# Patient Record
Sex: FEMALE | Race: ASIAN | Employment: FULL TIME | URBAN - METROPOLITAN AREA
[De-identification: names, ages, dates, MRNs, and addresses within clinical notes are randomized per-mention and may not be internally consistent; named-entity substitution may affect disease eponyms.]

---

## 2019-06-03 ENCOUNTER — OFFICE VISIT (OUTPATIENT)
Dept: FAMILY MEDICINE CLINIC | Facility: CLINIC | Age: 24
End: 2019-06-03
Payer: MEDICAID

## 2019-06-03 ENCOUNTER — APPOINTMENT (OUTPATIENT)
Dept: LAB | Age: 24
End: 2019-06-03
Attending: NURSE PRACTITIONER
Payer: MEDICAID

## 2019-06-03 VITALS
HEIGHT: 61 IN | HEART RATE: 64 BPM | SYSTOLIC BLOOD PRESSURE: 99 MMHG | BODY MASS INDEX: 17.94 KG/M2 | DIASTOLIC BLOOD PRESSURE: 62 MMHG | WEIGHT: 95 LBS

## 2019-06-03 DIAGNOSIS — Z11.1 SCREENING-PULMONARY TB: Primary | ICD-10-CM

## 2019-06-03 DIAGNOSIS — Z01.84 IMMUNITY STATUS TESTING: ICD-10-CM

## 2019-06-03 DIAGNOSIS — Z00.00 WELL ADULT EXAM: ICD-10-CM

## 2019-06-03 DIAGNOSIS — Z11.1 SCREENING-PULMONARY TB: ICD-10-CM

## 2019-06-03 DIAGNOSIS — Z23 IMMUNIZATION DUE: ICD-10-CM

## 2019-06-03 PROCEDURE — 90715 TDAP VACCINE 7 YRS/> IM: CPT | Performed by: NURSE PRACTITIONER

## 2019-06-03 PROCEDURE — 86735 MUMPS ANTIBODY: CPT

## 2019-06-03 PROCEDURE — 99395 PREV VISIT EST AGE 18-39: CPT | Performed by: NURSE PRACTITIONER

## 2019-06-03 PROCEDURE — 86480 TB TEST CELL IMMUN MEASURE: CPT

## 2019-06-03 PROCEDURE — 86706 HEP B SURFACE ANTIBODY: CPT

## 2019-06-03 PROCEDURE — 90471 IMMUNIZATION ADMIN: CPT | Performed by: NURSE PRACTITIONER

## 2019-06-03 PROCEDURE — 36415 COLL VENOUS BLD VENIPUNCTURE: CPT

## 2019-06-03 PROCEDURE — 86765 RUBEOLA ANTIBODY: CPT

## 2019-06-03 PROCEDURE — 86787 VARICELLA-ZOSTER ANTIBODY: CPT

## 2019-06-03 PROCEDURE — 86762 RUBELLA ANTIBODY: CPT

## 2019-06-03 NOTE — PATIENT INSTRUCTIONS
Prevention Guidelines, Women Ages 25 to 44  Screening tests and vaccines are an important part of managing your health. A screening test is done to find possible disorders or diseases in people who don't have any symptoms.  The goal is to find a disease e Type 2 diabetes, prediabetes All women diagnosed with gestational diabetes Lifelong testing every 3 years   Type 2 diabetes All women with prediabetes Every year   Gonorrhea Sexually active women at increased risk for infection At routine exams   Hepatitis Measles, mumps, rubella (MMR) All women in this age group who have no record of these infections or vaccines 1 or 2 doses   Meningococcal Women at increased risk for infection should talk with their healthcare provider 1 or more doses   Pneumococcal conjug © 9136-1410 [employer] [address]. 140[address] [facility], [facility] [place]. All rights reserved. This information is not intended as a substitute for professional medical care. Always follow your healthcare professional's instructions.

## 2019-06-03 NOTE — PROGRESS NOTES
HPI  Pt here for titers for nursing school. Is healthy adult-denies complaints, pmh  born in [place]  Negative fmh.     Vaccination records not available     Has IUD in place   Review of Systems   Constitutional: Negative for activity change, appetite null Medical: Not on file        Non-medical: Not on file    Tobacco Use      Smoking status: Never Smoker      Smokeless tobacco: Never Used    Substance and Sexual Activity      Alcohol use: Not Currently      Drug use: Not Currently      Sexual activity: Not tenderness. Abdominal: Soft. Bowel sounds are normal. She exhibits no distension. There is no tenderness. Musculoskeletal: Normal range of motion. She exhibits no tenderness. Lymphadenopathy:     She has no cervical adenopathy.    Neurological: She is

## 2019-06-07 ENCOUNTER — TELEPHONE (OUTPATIENT)
Dept: FAMILY MEDICINE CLINIC | Facility: CLINIC | Age: 24
End: 2019-06-07

## 2019-06-07 ENCOUNTER — NURSE ONLY (OUTPATIENT)
Dept: FAMILY MEDICINE CLINIC | Facility: CLINIC | Age: 24
End: 2019-06-07
Payer: MEDICAID

## 2019-06-07 DIAGNOSIS — Z23 IMMUNIZATION DUE: ICD-10-CM

## 2019-06-07 PROCEDURE — 90716 VAR VACCINE LIVE SUBQ: CPT | Performed by: NURSE PRACTITIONER

## 2019-06-07 PROCEDURE — 90471 IMMUNIZATION ADMIN: CPT | Performed by: NURSE PRACTITIONER

## 2019-06-07 NOTE — PROGRESS NOTES
Patient is here today for her varicella vaccine, verified name,  and medication.  Patient tolerated injection well       Patient will return in 6 wks for titers once more

## 2019-07-12 ENCOUNTER — APPOINTMENT (OUTPATIENT)
Dept: LAB | Age: 24
End: 2019-07-12
Attending: NURSE PRACTITIONER
Payer: MEDICAID

## 2019-07-12 DIAGNOSIS — Z01.84 IMMUNITY STATUS TESTING: ICD-10-CM

## 2019-07-12 DIAGNOSIS — Z01.84 IMMUNITY STATUS TESTING: Primary | ICD-10-CM

## 2019-07-12 LAB — VZV IGG SER IA-ACNC: 169.6 (ref 165–?)

## 2019-07-12 PROCEDURE — 36415 COLL VENOUS BLD VENIPUNCTURE: CPT

## 2019-07-12 PROCEDURE — 86787 VARICELLA-ZOSTER ANTIBODY: CPT

## 2019-07-15 ENCOUNTER — PATIENT MESSAGE (OUTPATIENT)
Dept: FAMILY MEDICINE CLINIC | Facility: CLINIC | Age: 24
End: 2019-07-15

## 2019-12-17 ENCOUNTER — OFFICE VISIT (OUTPATIENT)
Dept: FAMILY MEDICINE CLINIC | Facility: CLINIC | Age: 24
End: 2019-12-17
Payer: MEDICAID

## 2019-12-17 ENCOUNTER — LAB ENCOUNTER (OUTPATIENT)
Dept: LAB | Age: 24
End: 2019-12-17
Attending: NURSE PRACTITIONER
Payer: MEDICAID

## 2019-12-17 VITALS
HEART RATE: 71 BPM | HEIGHT: 61 IN | SYSTOLIC BLOOD PRESSURE: 104 MMHG | WEIGHT: 97 LBS | BODY MASS INDEX: 18.31 KG/M2 | DIASTOLIC BLOOD PRESSURE: 68 MMHG

## 2019-12-17 DIAGNOSIS — R22.32 AXILLARY MASS, LEFT: ICD-10-CM

## 2019-12-17 DIAGNOSIS — Z30.432 ENCOUNTER FOR IUD REMOVAL: ICD-10-CM

## 2019-12-17 DIAGNOSIS — Z23 IMMUNIZATION DUE: ICD-10-CM

## 2019-12-17 DIAGNOSIS — E04.9 ENLARGED THYROID: ICD-10-CM

## 2019-12-17 DIAGNOSIS — E04.9 ENLARGED THYROID: Primary | ICD-10-CM

## 2019-12-17 PROCEDURE — 90471 IMMUNIZATION ADMIN: CPT | Performed by: NURSE PRACTITIONER

## 2019-12-17 PROCEDURE — 36415 COLL VENOUS BLD VENIPUNCTURE: CPT

## 2019-12-17 PROCEDURE — 84443 ASSAY THYROID STIM HORMONE: CPT

## 2019-12-17 PROCEDURE — 84439 ASSAY OF FREE THYROXINE: CPT

## 2019-12-17 PROCEDURE — 99213 OFFICE O/P EST LOW 20 MIN: CPT | Performed by: NURSE PRACTITIONER

## 2019-12-17 PROCEDURE — 90651 9VHPV VACCINE 2/3 DOSE IM: CPT | Performed by: NURSE PRACTITIONER

## 2019-12-17 PROCEDURE — 58301 REMOVE INTRAUTERINE DEVICE: CPT | Performed by: NURSE PRACTITIONER

## 2019-12-17 NOTE — PATIENT INSTRUCTIONS
Guidelines for Safer Sex      Limit your sexual activity to only 1 partner who is having sex only with you to reduce exposure to disease-causing organisms. Follow these guidelines for safer sex:     Think twice before beginning sexual relations with a new p

## 2019-12-17 NOTE — PROGRESS NOTES
HPI  Pt presents for IUD removal-placed in 2017-gets a lot of vaginal d/c  Has lump on neck and in left axilla-non painful  Would like to start gardisil series. Has had flu shot this season    Review of Systems   HENT: Negative for trouble swallowing. Active member of club or organization: Not on file        Attends meetings of clubs or organizations: Not on file        Relationship status: Not on file      Intimate partner violence:        Fear of current or ex partner: Not on file        Emotionally a left        Relevant Orders    US AXILLARY LEFT SH(CPT=76882)                      Discussed plan of care with pt and pt is in agreement. All questions answered. Pt to call with questions or concerns.     Encouraged to sign up for My Chart if not already reg

## 2019-12-18 NOTE — PROCEDURES
Small speculum placed within vagina and opened' iud strings located from cervical os. Forceps inserted into speculum, strings grasped and with gentle tug, IUD was removed from uterus. No uterine bleeding noted. Pt tolerated procedure well.  Encouraged to u

## 2019-12-20 ENCOUNTER — PATIENT MESSAGE (OUTPATIENT)
Dept: FAMILY MEDICINE CLINIC | Facility: CLINIC | Age: 24
End: 2019-12-20

## 2019-12-20 DIAGNOSIS — E03.9 HYPOTHYROIDISM, UNSPECIFIED TYPE: Primary | ICD-10-CM

## 2019-12-20 RX ORDER — FLUCONAZOLE 200 MG/1
200 TABLET ORAL DAILY
Qty: 3 TABLET | Refills: 0 | Status: SHIPPED | OUTPATIENT
Start: 2019-12-20 | End: 2019-12-23

## 2019-12-23 NOTE — TELEPHONE ENCOUNTER
From: Luis Adler  To: KATARZYNA Gutierrez  Sent: 12/20/2019 11:44 PM CST  Subject: Test Results Question    I have a question about TSH W REFLEX TO FREE T4 resulted on 12/17/19, 3:10 PM.    I already scheduled an appointment for thyroid ultrasound

## 2019-12-24 ENCOUNTER — LAB ENCOUNTER (OUTPATIENT)
Dept: LAB | Age: 24
End: 2019-12-24
Attending: NURSE PRACTITIONER
Payer: MEDICAID

## 2019-12-24 DIAGNOSIS — E03.9 HYPOTHYROIDISM, UNSPECIFIED TYPE: ICD-10-CM

## 2019-12-24 PROCEDURE — 84443 ASSAY THYROID STIM HORMONE: CPT

## 2019-12-24 PROCEDURE — 84439 ASSAY OF FREE THYROXINE: CPT

## 2019-12-24 PROCEDURE — 86800 THYROGLOBULIN ANTIBODY: CPT

## 2019-12-24 PROCEDURE — 84480 ASSAY TRIIODOTHYRONINE (T3): CPT

## 2019-12-24 PROCEDURE — 86376 MICROSOMAL ANTIBODY EACH: CPT

## 2019-12-24 PROCEDURE — 36415 COLL VENOUS BLD VENIPUNCTURE: CPT

## 2019-12-24 PROCEDURE — 83970 ASSAY OF PARATHORMONE: CPT

## 2019-12-24 PROCEDURE — 84481 FREE ASSAY (FT-3): CPT

## 2019-12-30 DIAGNOSIS — E03.9 ACQUIRED HYPOTHYROIDISM: Primary | ICD-10-CM

## 2019-12-30 RX ORDER — LEVOTHYROXINE SODIUM 25 UG/1
25 CAPSULE ORAL DAILY
Qty: 90 CAPSULE | Refills: 1 | Status: SHIPPED | OUTPATIENT
Start: 2019-12-30 | End: 2019-12-31

## 2019-12-31 ENCOUNTER — TELEPHONE (OUTPATIENT)
Dept: FAMILY MEDICINE CLINIC | Facility: CLINIC | Age: 24
End: 2019-12-31

## 2019-12-31 DIAGNOSIS — E03.9 ACQUIRED HYPOTHYROIDISM: ICD-10-CM

## 2019-12-31 RX ORDER — LEVOTHYROXINE SODIUM 25 UG/1
25 CAPSULE ORAL DAILY
Qty: 90 CAPSULE | Refills: 1 | Status: SHIPPED | OUTPATIENT
Start: 2019-12-31 | End: 2020-01-03 | Stop reason: ALTCHOICE

## 2019-12-31 NOTE — TELEPHONE ENCOUNTER
Pt requesting to send medication to Dinah in [facility], [address] [place] [address].  Sent

## 2020-01-02 ENCOUNTER — TELEPHONE (OUTPATIENT)
Dept: FAMILY MEDICINE CLINIC | Facility: CLINIC | Age: 25
End: 2020-01-02

## 2020-01-02 RX ORDER — FLUCONAZOLE 200 MG/1
200 TABLET ORAL DAILY
Qty: 3 TABLET | Refills: 0 | Status: SHIPPED | OUTPATIENT
Start: 2020-01-02 | End: 2020-01-03

## 2020-01-02 NOTE — TELEPHONE ENCOUNTER
Patient indicating her yeast symptoms have not completely resolved. States you indicated that you would continue treatment for three more days if symptoms did not improve. Asking if new rx can be sent to her pharmacy.

## 2020-01-02 NOTE — TELEPHONE ENCOUNTER
Attempt made to contact the patient; no answer phone does not ring. Zinwave message sent to the patient.

## 2020-01-02 NOTE — TELEPHONE ENCOUNTER
----- Message from Shorty Browne sent at 1/2/2020  9:23 AM CST -----  Regarding: Prescription Question  Contact: [phone]  Good morning. Can you please prescribe more dosage for my yeast infection? Thank you!

## 2020-01-03 ENCOUNTER — TELEPHONE (OUTPATIENT)
Dept: FAMILY MEDICINE CLINIC | Facility: CLINIC | Age: 25
End: 2020-01-03

## 2020-01-03 RX ORDER — LEVOTHYROXINE SODIUM 0.03 MG/1
25 TABLET ORAL
Qty: 90 TABLET | Refills: 1 | Status: SHIPPED | OUTPATIENT
Start: 2020-01-03 | End: 2021-02-09

## 2020-01-03 RX ORDER — FLUCONAZOLE 200 MG/1
200 TABLET ORAL DAILY
Qty: 3 TABLET | Refills: 0 | Status: SHIPPED | OUTPATIENT
Start: 2020-01-03 | End: 2020-01-06

## 2020-01-03 NOTE — TELEPHONE ENCOUNTER
Spoke with patient, informed of RX being sent to [facility], States please send to [place] as [facility] does nit cover her insurance,, RX sent to [facility] and  Cancelled the RX for Fluconazole

## 2020-01-06 ENCOUNTER — PATIENT MESSAGE (OUTPATIENT)
Dept: FAMILY MEDICINE CLINIC | Facility: CLINIC | Age: 25
End: 2020-01-06

## 2020-01-06 DIAGNOSIS — E01.0 THYROMEGALY: Primary | ICD-10-CM

## 2020-01-06 DIAGNOSIS — E03.9 ACQUIRED HYPOTHYROIDISM: ICD-10-CM

## 2020-01-07 ENCOUNTER — HOSPITAL ENCOUNTER (OUTPATIENT)
Dept: ULTRASOUND IMAGING | Facility: HOSPITAL | Age: 25
Discharge: HOME | End: 2020-01-07
Attending: NURSE PRACTITIONER
Payer: MEDICAID

## 2020-01-07 ENCOUNTER — APPOINTMENT (OUTPATIENT)
Dept: ULTRASOUND IMAGING | Facility: HOSPITAL | Age: 25
End: 2020-01-07
Attending: NURSE PRACTITIONER
Payer: MEDICAID

## 2020-01-07 DIAGNOSIS — R22.32 AXILLARY MASS, LEFT: ICD-10-CM

## 2020-01-07 PROCEDURE — 76882 US LMTD JT/FCL EVL NVASC XTR: CPT | Performed by: NURSE PRACTITIONER

## 2020-01-07 NOTE — TELEPHONE ENCOUNTER
From: Frankey Player  To: KATARZYNA Tabor  Sent: 1/6/2020 9:57 PM CST  Subject: Other    Dear Kem Phoenix,    My insurance canceled my appointment for thyroid ultrasound again. Please let me know your further plan of care. Thank you.

## 2020-01-13 ENCOUNTER — PATIENT MESSAGE (OUTPATIENT)
Dept: FAMILY MEDICINE CLINIC | Facility: CLINIC | Age: 25
End: 2020-01-13

## 2020-01-14 RX ORDER — FLUCONAZOLE 200 MG/1
TABLET ORAL
Qty: 2 TABLET | Refills: 0 | Status: SHIPPED | OUTPATIENT
Start: 2020-01-14 | End: 2020-07-07

## 2020-01-14 NOTE — TELEPHONE ENCOUNTER
From: Frankey Player  To: KATARZYNA Tabor  Sent: 1/13/2020 11:48 PM CST  Subject: Visit Follow-up Question    Dear Kem Phoenix,  Matt evening.    There is less to a little bit discharge coming out after I finished taking the medications for yeast i

## 2020-01-14 NOTE — TELEPHONE ENCOUNTER
Cortes Mtz: last fluconazole sent 1/3/2020. Please advise if she needs to be seen or rx can be sent.

## 2020-02-01 ENCOUNTER — APPOINTMENT (OUTPATIENT)
Dept: LAB | Age: 25
End: 2020-02-01
Attending: NURSE PRACTITIONER
Payer: MEDICAID

## 2020-02-01 DIAGNOSIS — E03.9 ACQUIRED HYPOTHYROIDISM: ICD-10-CM

## 2020-02-01 LAB — TSI SER-ACNC: 2.2 MIU/ML (ref 0.36–3.74)

## 2020-02-01 PROCEDURE — 84443 ASSAY THYROID STIM HORMONE: CPT

## 2020-02-01 PROCEDURE — 36415 COLL VENOUS BLD VENIPUNCTURE: CPT

## 2020-05-27 ENCOUNTER — PATIENT MESSAGE (OUTPATIENT)
Dept: FAMILY MEDICINE CLINIC | Facility: CLINIC | Age: 25
End: 2020-05-27

## 2020-05-28 NOTE — TELEPHONE ENCOUNTER
RuiYi message sent. From: Hanny Escobedo  To: KATARZYNA Goff  Sent: 5/27/2020 10:14 PM CDT  Subject: Non-Urgent Medical Question    Dear doctor,    Can you please fax my immunization records to the attached contact number? Thank you!

## 2020-07-07 ENCOUNTER — OFFICE VISIT (OUTPATIENT)
Dept: FAMILY MEDICINE CLINIC | Facility: CLINIC | Age: 25
End: 2020-07-07
Payer: MEDICAID

## 2020-07-07 ENCOUNTER — APPOINTMENT (OUTPATIENT)
Dept: LAB | Age: 25
End: 2020-07-07
Attending: NURSE PRACTITIONER
Payer: MEDICAID

## 2020-07-07 VITALS
HEART RATE: 63 BPM | BODY MASS INDEX: 18.12 KG/M2 | SYSTOLIC BLOOD PRESSURE: 107 MMHG | HEIGHT: 61 IN | WEIGHT: 96 LBS | DIASTOLIC BLOOD PRESSURE: 68 MMHG

## 2020-07-07 DIAGNOSIS — E03.9 ACQUIRED HYPOTHYROIDISM: ICD-10-CM

## 2020-07-07 DIAGNOSIS — N89.8 VAGINAL DISCHARGE: ICD-10-CM

## 2020-07-07 DIAGNOSIS — R35.0 URINARY FREQUENCY: ICD-10-CM

## 2020-07-07 DIAGNOSIS — Z23 IMMUNIZATION DUE: Primary | ICD-10-CM

## 2020-07-07 LAB
MULTISTIX LOT#: 1044 NUMERIC
PH, URINE: 6.7 (ref 4.5–8)
SPECIFIC GRAVITY: 1.01 (ref 1–1.03)
TSI SER-ACNC: 1.01 MIU/ML (ref 0.36–3.74)
URINE-COLOR: YELLOW
UROBILINOGEN,SEMI-QN: 0.2 MG/DL (ref 0–1.9)

## 2020-07-07 PROCEDURE — 90471 IMMUNIZATION ADMIN: CPT | Performed by: NURSE PRACTITIONER

## 2020-07-07 PROCEDURE — 81003 URINALYSIS AUTO W/O SCOPE: CPT | Performed by: NURSE PRACTITIONER

## 2020-07-07 PROCEDURE — 84443 ASSAY THYROID STIM HORMONE: CPT

## 2020-07-07 PROCEDURE — 36415 COLL VENOUS BLD VENIPUNCTURE: CPT

## 2020-07-07 PROCEDURE — 90651 9VHPV VACCINE 2/3 DOSE IM: CPT | Performed by: NURSE PRACTITIONER

## 2020-07-07 PROCEDURE — 99213 OFFICE O/P EST LOW 20 MIN: CPT | Performed by: NURSE PRACTITIONER

## 2020-07-07 RX ORDER — NITROFURANTOIN 25; 75 MG/1; MG/1
100 CAPSULE ORAL 2 TIMES DAILY
Qty: 14 CAPSULE | Refills: 0 | Status: SHIPPED | OUTPATIENT
Start: 2020-07-07 | End: 2020-10-29 | Stop reason: ALTCHOICE

## 2020-07-07 NOTE — PROGRESS NOTES
HPI    Pt presents for 2nd hpv    F/u on tsh-is taking levothyroxine 25 mcg daily; insurance would not approve her thyroid u/s    Having vaginal d/c after period -feeling like she needs to go to bathroom. Has some vaginal itching.    Review of Systems   Con Social Needs      Financial resource strain: Not on file      Food insecurity:        Worry: Not on file        Inability: Not on file      Transportation needs:        Medical: Not on file        Non-medical: Not on file    Tobacco Use      Smoking status exhibits no distension. Genitourinary: There is no rash or tenderness on the right labia. There is no rash or tenderness on the left labia. Vaginal erythema and tenderness present. No vaginal discharge.    There is erythema and tenderness in the v

## 2020-07-07 NOTE — PATIENT INSTRUCTIONS
Hypothyroidism    You have hypothyroidism. This means your thyroid gland is not making enough thyroid hormone. This hormone is vital to body growth and metabolism. If you don’t make enough, many body processes slow down.  This can cause symptoms throughou · Don’t take other medicines with your thyroid hormone pill without checking with your provider first.  · Tell your provider if you have any side effects from your medicines that bother you, especially any chest pain or irregular heartbeats.   · Never null · Change out of wet bathing suits and damp exercise clothes as soon as possible. Yeast thrive in a warm, moist environment. · Avoid wearing tight pants. Choose cotton underwear and pantyhose that have a cotton crotch.  Severa Spinner keeps you cooler and drier rafaela

## 2020-07-09 LAB
GENITAL VAGINOSIS SCREEN: POSITIVE
TRICHOMONAS SCREEN: NEGATIVE

## 2020-10-29 ENCOUNTER — OFFICE VISIT (OUTPATIENT)
Dept: FAMILY MEDICINE CLINIC | Facility: CLINIC | Age: 25
End: 2020-10-29
Payer: MEDICAID

## 2020-10-29 VITALS
WEIGHT: 99 LBS | BODY MASS INDEX: 18.69 KG/M2 | HEIGHT: 61 IN | DIASTOLIC BLOOD PRESSURE: 66 MMHG | HEART RATE: 66 BPM | TEMPERATURE: 98 F | SYSTOLIC BLOOD PRESSURE: 101 MMHG

## 2020-10-29 DIAGNOSIS — Z30.42 ENCOUNTER FOR DEPO-PROVERA CONTRACEPTION: ICD-10-CM

## 2020-10-29 DIAGNOSIS — Z23 ENCOUNTER FOR ADMINISTRATION OF VACCINE: Primary | ICD-10-CM

## 2020-10-29 DIAGNOSIS — N89.8 VAGINAL DISCHARGE: ICD-10-CM

## 2020-10-29 PROCEDURE — 90471 IMMUNIZATION ADMIN: CPT | Performed by: NURSE PRACTITIONER

## 2020-10-29 PROCEDURE — 81025 URINE PREGNANCY TEST: CPT | Performed by: NURSE PRACTITIONER

## 2020-10-29 PROCEDURE — 99214 OFFICE O/P EST MOD 30 MIN: CPT | Performed by: NURSE PRACTITIONER

## 2020-10-29 PROCEDURE — 90651 9VHPV VACCINE 2/3 DOSE IM: CPT | Performed by: NURSE PRACTITIONER

## 2020-10-29 PROCEDURE — 96372 THER/PROPH/DIAG INJ SC/IM: CPT | Performed by: NURSE PRACTITIONER

## 2020-10-29 PROCEDURE — 3078F DIAST BP <80 MM HG: CPT | Performed by: NURSE PRACTITIONER

## 2020-10-29 PROCEDURE — 3074F SYST BP LT 130 MM HG: CPT | Performed by: NURSE PRACTITIONER

## 2020-10-29 PROCEDURE — 3008F BODY MASS INDEX DOCD: CPT | Performed by: NURSE PRACTITIONER

## 2020-10-29 RX ORDER — FLUCONAZOLE 200 MG/1
TABLET ORAL
Qty: 2 TABLET | Refills: 0 | Status: SHIPPED | OUTPATIENT
Start: 2020-10-29 | End: 2021-03-22

## 2020-10-29 RX ORDER — MEDROXYPROGESTERONE ACETATE 150 MG/ML
150 INJECTION, SUSPENSION INTRAMUSCULAR ONCE
Status: COMPLETED | OUTPATIENT
Start: 2020-10-29 | End: 2020-10-29

## 2020-10-29 RX ADMIN — MEDROXYPROGESTERONE ACETATE 150 MG: 150 INJECTION, SUSPENSION INTRAMUSCULAR at 13:45:00

## 2020-10-29 NOTE — PATIENT INSTRUCTIONS
Yeast Infection (Candida Vaginal Infection)    You have a Candida vaginal infection. This is also known as a yeast infection. It is most often caused by a type of yeast (fungus) called Candida. Candida are normally found in the vagina.  But if they increa educational content on 10/1/2017  © 2125-8955 [employer] [address]. 1407 [facility], [facility]2 [place]. All rights reserved. This information is not intended as a substitute for professional medical care.  Always follow your healthcare professio

## 2020-10-29 NOTE — PROGRESS NOTES
HPI    Patient presents for white vaginal discharge that has been present for the past two months. Denies urinary symptoms. Currently sexually active. Using condoms for protection. Refusing std screening today. Would like gardasil shot today. Talks on phone: Not on file        Gets together: Not on file        Attends Hinduism service: Not on file        Active member of club or organization: Not on file        Attends meetings of clubs or organizations: Not on file        Relationship status: Depo-Provera contraception        Relevant Medications    medroxyPROGESTERone Acetate MICHAEL 150 mg    Other Relevant Orders    URINE PREGNANCY TEST         Fluconazole as directed. Vaginal culture collected and sent out.   UCG in office negative; will recei

## 2020-10-30 DIAGNOSIS — B96.89 BV (BACTERIAL VAGINOSIS): Primary | ICD-10-CM

## 2020-10-30 DIAGNOSIS — N76.0 BV (BACTERIAL VAGINOSIS): Primary | ICD-10-CM

## 2020-10-30 RX ORDER — METRONIDAZOLE 500 MG/1
500 TABLET ORAL 2 TIMES DAILY
Qty: 14 TABLET | Refills: 0 | Status: SHIPPED | OUTPATIENT
Start: 2020-10-30 | End: 2020-11-06

## 2021-02-09 RX ORDER — LEVOTHYROXINE SODIUM 0.03 MG/1
25 TABLET ORAL
Qty: 90 TABLET | Refills: 1 | Status: SHIPPED | OUTPATIENT
Start: 2021-02-09 | End: 2021-08-24

## 2021-03-22 ENCOUNTER — OFFICE VISIT (OUTPATIENT)
Dept: FAMILY MEDICINE CLINIC | Facility: CLINIC | Age: 26
End: 2021-03-22
Payer: MEDICAID

## 2021-03-22 ENCOUNTER — LAB ENCOUNTER (OUTPATIENT)
Dept: LAB | Age: 26
End: 2021-03-22
Attending: NURSE PRACTITIONER
Payer: MEDICAID

## 2021-03-22 VITALS
BODY MASS INDEX: 19.07 KG/M2 | WEIGHT: 101 LBS | DIASTOLIC BLOOD PRESSURE: 70 MMHG | HEIGHT: 61 IN | SYSTOLIC BLOOD PRESSURE: 106 MMHG | HEART RATE: 67 BPM

## 2021-03-22 DIAGNOSIS — E03.9 ACQUIRED HYPOTHYROIDISM: ICD-10-CM

## 2021-03-22 DIAGNOSIS — Z00.00 WELL ADULT EXAM: ICD-10-CM

## 2021-03-22 DIAGNOSIS — E04.9 ENLARGED THYROID: ICD-10-CM

## 2021-03-22 DIAGNOSIS — Z01.419 WELL WOMAN EXAM WITH ROUTINE GYNECOLOGICAL EXAM: ICD-10-CM

## 2021-03-22 DIAGNOSIS — Z01.419 WELL WOMAN EXAM WITH ROUTINE GYNECOLOGICAL EXAM: Primary | ICD-10-CM

## 2021-03-22 PROBLEM — R35.0 URINARY FREQUENCY: Status: RESOLVED | Noted: 2020-07-07 | Resolved: 2021-03-22

## 2021-03-22 PROBLEM — Z30.432 ENCOUNTER FOR IUD REMOVAL: Status: RESOLVED | Noted: 2019-12-17 | Resolved: 2021-03-22

## 2021-03-22 PROBLEM — Z23 IMMUNIZATION DUE: Status: RESOLVED | Noted: 2019-12-17 | Resolved: 2021-03-22

## 2021-03-22 PROBLEM — N89.8 VAGINAL DISCHARGE: Status: RESOLVED | Noted: 2020-07-07 | Resolved: 2021-03-22

## 2021-03-22 LAB
ALBUMIN SERPL-MCNC: 4.3 G/DL (ref 3.4–5)
ALBUMIN/GLOB SERPL: 1.1 {RATIO} (ref 1–2)
ALP LIVER SERPL-CCNC: 63 U/L
ALT SERPL-CCNC: 23 U/L
ANION GAP SERPL CALC-SCNC: 5 MMOL/L (ref 0–18)
AST SERPL-CCNC: 19 U/L (ref 15–37)
BILIRUB SERPL-MCNC: 1 MG/DL (ref 0.1–2)
BUN BLD-MCNC: 14 MG/DL (ref 7–18)
BUN/CREAT SERPL: 18.9 (ref 10–20)
CALCIUM BLD-MCNC: 9.4 MG/DL (ref 8.5–10.1)
CHLORIDE SERPL-SCNC: 105 MMOL/L (ref 98–112)
CHOLEST SMN-MCNC: 165 MG/DL (ref ?–200)
CO2 SERPL-SCNC: 29 MMOL/L (ref 21–32)
CREAT BLD-MCNC: 0.74 MG/DL
DEPRECATED RDW RBC AUTO: 37.3 FL (ref 35.1–46.3)
ERYTHROCYTE [DISTWIDTH] IN BLOOD BY AUTOMATED COUNT: 11.4 % (ref 11–15)
EST. AVERAGE GLUCOSE BLD GHB EST-MCNC: 91 MG/DL (ref 68–126)
GLOBULIN PLAS-MCNC: 3.9 G/DL (ref 2.8–4.4)
GLUCOSE BLD-MCNC: 85 MG/DL (ref 70–99)
HBA1C MFR BLD HPLC: 4.8 % (ref ?–5.7)
HCT VFR BLD AUTO: 38.5 %
HDLC SERPL-MCNC: 58 MG/DL (ref 40–59)
HGB BLD-MCNC: 12.6 G/DL
LDLC SERPL CALC-MCNC: 91 MG/DL (ref ?–100)
M PROTEIN MFR SERPL ELPH: 8.2 G/DL (ref 6.4–8.2)
MCH RBC QN AUTO: 29.5 PG (ref 26–34)
MCHC RBC AUTO-ENTMCNC: 32.7 G/DL (ref 31–37)
MCV RBC AUTO: 90.2 FL
NONHDLC SERPL-MCNC: 107 MG/DL (ref ?–130)
OSMOLALITY SERPL CALC.SUM OF ELEC: 288 MOSM/KG (ref 275–295)
PATIENT FASTING Y/N/NP: YES
PATIENT FASTING Y/N/NP: YES
PLATELET # BLD AUTO: 246 10(3)UL (ref 150–450)
POTASSIUM SERPL-SCNC: 3.7 MMOL/L (ref 3.5–5.1)
RBC # BLD AUTO: 4.27 X10(6)UL
SODIUM SERPL-SCNC: 139 MMOL/L (ref 136–145)
TRIGL SERPL-MCNC: 79 MG/DL (ref 30–149)
TSI SER-ACNC: 2.16 MIU/ML (ref 0.36–3.74)
VIT B12 SERPL-MCNC: 973 PG/ML (ref 193–986)
VLDLC SERPL CALC-MCNC: 16 MG/DL (ref 0–30)
WBC # BLD AUTO: 5.9 X10(3) UL (ref 4–11)

## 2021-03-22 PROCEDURE — 85027 COMPLETE CBC AUTOMATED: CPT

## 2021-03-22 PROCEDURE — 3078F DIAST BP <80 MM HG: CPT | Performed by: NURSE PRACTITIONER

## 2021-03-22 PROCEDURE — 36415 COLL VENOUS BLD VENIPUNCTURE: CPT

## 2021-03-22 PROCEDURE — 80061 LIPID PANEL: CPT

## 2021-03-22 PROCEDURE — 83036 HEMOGLOBIN GLYCOSYLATED A1C: CPT

## 2021-03-22 PROCEDURE — 3008F BODY MASS INDEX DOCD: CPT | Performed by: NURSE PRACTITIONER

## 2021-03-22 PROCEDURE — 84443 ASSAY THYROID STIM HORMONE: CPT

## 2021-03-22 PROCEDURE — 3074F SYST BP LT 130 MM HG: CPT | Performed by: NURSE PRACTITIONER

## 2021-03-22 PROCEDURE — 99395 PREV VISIT EST AGE 18-39: CPT | Performed by: NURSE PRACTITIONER

## 2021-03-22 PROCEDURE — 82306 VITAMIN D 25 HYDROXY: CPT

## 2021-03-22 PROCEDURE — 80053 COMPREHEN METABOLIC PANEL: CPT

## 2021-03-22 PROCEDURE — 82607 VITAMIN B-12: CPT

## 2021-03-22 NOTE — ASSESSMENT & PLAN NOTE
Screening labs  Pap w vag culture, g/c  Please aim to eat a diet high in fresh fruits and vegetables, lean protein sources, complex carbohydrates and limited processed and fast foods.   Try to get at least 150 minutes of exercise per week-a combination of w

## 2021-03-22 NOTE — PROGRESS NOTES
HPI  Pt here for follow up thyroid. Had depo shot in October but never follow up in January. Started bleeding and had period for 12 days. Is concerned that she may be anemic. Decided that she did not want to be on hormonal birth control.      Is due for pa Highest education level: Not on file    Occupational History      Not on file    Tobacco Use      Smoking status: Never Smoker      Smokeless tobacco: Never Used    Substance and Sexual Activity      Alcohol use: Not Currently      Drug use: Not Currentl Pupils are equal, round, and reactive to light. Neck:      Thyroid: No thyromegaly. Cardiovascular:      Rate and Rhythm: Normal rate and regular rhythm. Heart sounds: Normal heart sounds. No murmur heard.      Pulmonary:      Effort: Pulmonary eff Content:  Thought content normal.         Judgment: Judgment normal.         Assessment and Plan:  Problem List Items Addressed This Visit        Endocrine    Enlarged thyroid    Acquired hypothyroidism     Continue levothyroxine 25 mcg  Check tsh

## 2021-03-22 NOTE — PATIENT INSTRUCTIONS
Prevention Guidelines, Women Ages 25 to 44  Screening tests and vaccines are an important part of managing your health. A screening test is done to find possible disorders or diseases in people who don't have any symptoms.  The goal is to find a disease e Anyone at increased risk  At routine exams   HIV All women At routine exams3     Obesity All women in this age group  At routine exams   Syphilis Women at increased risk for infection should talk with their healthcare provider  At routine exams   Tuberculo (protects against 13 types of pneumococcal bacteria)   PPSV23: 1 to 2 doses through age 59, or 1 dose at 72 or older (protects against 23 types of pneumococcal bacteria)    Tetanus/diphtheria/pertussis (Td/Tdap) booster All women in this age group  Td ever not intended as a substitute for professional medical care. Always follow your healthcare professional's instructions. Prevention Guidelines, Women Ages 25 to 44  Screening tests and vaccines are an important part of managing your health.  A screenin pregnancy after the 24th week.     Gonorrhea Sexually active women at increased risk for infection  At routine exams   Hepatitis C Anyone at increased risk  At routine exams   HIV All women At routine exams3     Obesity All women in this age group  At rout polysaccharide vaccine (PPSV23)  Women at increased risk for infection should talk with their healthcare provider  PCV13: 1 dose ages 23 to 72 (protects against 13 types of pneumococcal bacteria)   PPSV23: 1 to 2 doses through age 59, or 1 dose at 72 or ol Manny last reviewed this educational content on 10/1/2017  © 8824-9396 [employer] [address]. All rights reserved. This information is not intended as a substitute for professional medical care.  Always follow your healthcare professional's instructi there may be reason for concern. Your provider may want to see you right away if you have:   · Nipple discharge that is bloody  · Skin changes on your breast, such as dimpling or puckering  It’s okay to be upset if you find a lump.  Be sure to call your p reactive cellular changes.)  · Atypical squamous cells. Test results are unclear. Cells on the surface of the cervix show changes, but their significance is not yet known. Testing for HPV and other sexually transmitted infections (STIs) may be needed.  Linda

## 2021-03-23 LAB
C TRACH DNA SPEC QL NAA+PROBE: NEGATIVE
N GONORRHOEA DNA SPEC QL NAA+PROBE: NEGATIVE

## 2021-03-24 ENCOUNTER — PATIENT MESSAGE (OUTPATIENT)
Dept: FAMILY MEDICINE CLINIC | Facility: CLINIC | Age: 26
End: 2021-03-24

## 2021-03-24 LAB
25(OH)D3 SERPL-MCNC: 11.7 NG/ML (ref 30–100)
GENITAL VAGINOSIS SCREEN: NEGATIVE
TRICHOMONAS SCREEN: NEGATIVE

## 2021-03-25 PROBLEM — E55.9 VITAMIN D DEFICIENCY: Status: ACTIVE | Noted: 2021-03-25

## 2021-03-25 NOTE — TELEPHONE ENCOUNTER
From: Coral Rothman  To: KATARZYNA Bowman  Sent: 3/24/2021 6:35 PM CDT  Subject: Non-Urgent Medical Question    Dear Junior Morales,    I hope you are doing well! My TSH level is within the normal range. Do you still need to take my medication daily?  Samuel Kaba

## 2021-03-27 ENCOUNTER — TELEPHONE (OUTPATIENT)
Dept: FAMILY MEDICINE CLINIC | Facility: CLINIC | Age: 26
End: 2021-03-27

## 2021-03-27 NOTE — TELEPHONE ENCOUNTER
Your vitamin D levels are low-you will need to take a vitamin D supplement called ergocalciferol 50,000 iu-please take 1 capsule once per week. We will recheck your Vitamin D levels in 6 months.  I have sent a prescription for the vitamin D to your pharmacy

## 2021-06-24 ENCOUNTER — OFFICE VISIT (OUTPATIENT)
Dept: FAMILY MEDICINE CLINIC | Facility: CLINIC | Age: 26
End: 2021-06-24
Payer: MEDICAID

## 2021-06-24 VITALS
DIASTOLIC BLOOD PRESSURE: 68 MMHG | WEIGHT: 96 LBS | HEART RATE: 73 BPM | BODY MASS INDEX: 18.12 KG/M2 | HEIGHT: 61 IN | SYSTOLIC BLOOD PRESSURE: 101 MMHG

## 2021-06-24 DIAGNOSIS — Z32.01 POSITIVE PREGNANCY TEST: Primary | ICD-10-CM

## 2021-06-24 PROCEDURE — 3074F SYST BP LT 130 MM HG: CPT | Performed by: NURSE PRACTITIONER

## 2021-06-24 PROCEDURE — 3008F BODY MASS INDEX DOCD: CPT | Performed by: NURSE PRACTITIONER

## 2021-06-24 PROCEDURE — 99214 OFFICE O/P EST MOD 30 MIN: CPT | Performed by: NURSE PRACTITIONER

## 2021-06-24 PROCEDURE — 81025 URINE PREGNANCY TEST: CPT | Performed by: NURSE PRACTITIONER

## 2021-06-24 PROCEDURE — 3078F DIAST BP <80 MM HG: CPT | Performed by: NURSE PRACTITIONER

## 2021-06-24 RX ORDER — PNV NO.95/FERROUS FUM/FOLIC AC 28MG-0.8MG
1 TABLET ORAL DAILY
Qty: 30 TABLET | Refills: 11 | Status: SHIPPED | OUTPATIENT
Start: 2021-06-24 | End: 2021-07-24

## 2021-06-24 NOTE — PROGRESS NOTES
HPI  Pt presents with positive home pregnancy test one week ago    Feels tired, low appetite-denies n/v, pelvic pain or vaginal bleeding  lmp 5/10/2021    Has not started pnv; is not certain if she wants to continue with pregnancy.      Has just finished nu Out of Food in the Last Year:   Transportation Needs:       Lack of Transportation (Medical):       Lack of Transportation (Non-Medical):   Physical Activity:       Days of Exercise per Week:       Minutes of Exercise per Session:   Stress:       Feeling o URINE PREGNANCY TEST (Completed)                      Discussed plan of care with pt and pt is in agreement. All questions answered. Pt to call with questions or concerns. Encouraged to sign up for My Chart if not already registered.

## 2021-06-24 NOTE — PATIENT INSTRUCTIONS
Established Pregnancy, Normal Symptoms    You are pregnant and are having symptoms that worry you. During pregnancy, it’s normal to have many kinds of symptoms. Here is a list of common symptoms that happen during pregnancy.    Circulation changes  · Blee lashawn flavoring. Leg swelling and varicose veins   · Wear elastic support hose. Put your feet up as often as possible. Constipation  · Eat more fresh fruits and vegetables and more whole grains. Drink more clear liquids.   Joint and muscle pain  · Avoid intended as a substitute for professional medical care. Always follow your healthcare professional's instructions. Exercise During Pregnancy  Regular exercise can help you adapt to the changes your body is going through during pregnancy.  Exercising balancing skills later in pregnancy. Do Kegel exercises  Kegel exercises strengthen the pelvic floor muscles used in childbirth. These muscles are the same ones used to stop the flow of urine.  Do Kegel exercises daily:   · Squeeze your pelvic floor muscle Instead, learn how to be a healthy eater. Start by doing it for your baby. Soon, you may do it for yourself. Vitamins and supplements  Talk with your healthcare provider about taking these and other prenatal vitamins and supplements.   · Iron makes the ext Your exam today shows that you are pregnant. Pregnancy symptoms  During pregnancy your body’s hormones change. This causes physical and emotional changes.  This is normal. Knowing what to expect is important for your piece of mind and so you know when to s avoid marijuana. · If you were using recreational drugs or prescribed medicine when you found out that you were pregnant, talk with your healthcare provider about possible effects on your growing baby.   · If you have medical problems that you need to take cups   Examples of 1 servin cups raw, leafy greens   1 cup raw or cooked cut-up vegetables   1 cup 100% vegetable juice (pasteurized)  6 to 8 ounces  Examples of 1-ounce servings:   1 slice bread  1/2 cup cooked rice  1/2 cup cooked cereal   1/2 cup pregnancy. They help keep you and your baby healthy. Take them at different times because calcium makes it hard for the body to absorb iron. Taking iron with orange juice helps to increase its absorption.   Manny last reviewed this educational content on before you get out of bed. · Avoid smells that bother you. · Avoid spicy and fatty foods. · Eat an ice pop in your favorite flavor. · Get plenty of rest.  · Ask your healthcare provider about taking lashawn or vitamin B6 for nausea and vomiting.   · Talk child, you and your baby will change every day. To help you understand what is happening, we’ve outlined how pregnancy begins and some of the changes you may notice. Your changing body  Pregnancy affects almost every part of your body.  You may notice so for professional medical care. Always follow your healthcare professional's instructions. Pregnancy: Common Questions  There are plenty of myths and “old wives’ tales” about pregnancy. You may need help  fact from fiction.  On this sheet, y prevent injury and reduce back pain during pregnancy. To protect your back:  · Bend at the knees to bring the load nearer. · Get a good . Test the weight of the load. · Tighten your belly. Exhale as you lift.   · Lift with your legs, not with your suzan content on 8/1/2020  © 3247-9340 [employer] [address]. All rights reserved. This information is not intended as a substitute for professional medical care. Always follow your healthcare professional's instructions.

## 2021-06-25 ENCOUNTER — TELEPHONE (OUTPATIENT)
Dept: FAMILY MEDICINE CLINIC | Facility: CLINIC | Age: 26
End: 2021-06-25

## 2021-06-25 NOTE — TELEPHONE ENCOUNTER
Prabhakar Smoker from Claims in [place] would like new orders with a diagnosis code for labs taken on 3/22/2021. The diagnosis currently is not covered by insurance. Please fax new orders to [phone].      Reference # [id number]

## 2021-06-26 ENCOUNTER — OFFICE VISIT (OUTPATIENT)
Dept: OBGYN CLINIC | Facility: CLINIC | Age: 26
End: 2021-06-26
Payer: MEDICAID

## 2021-06-26 VITALS
WEIGHT: 97 LBS | BODY MASS INDEX: 18.31 KG/M2 | HEIGHT: 61 IN | SYSTOLIC BLOOD PRESSURE: 98 MMHG | DIASTOLIC BLOOD PRESSURE: 67 MMHG | HEART RATE: 80 BPM

## 2021-06-26 DIAGNOSIS — N92.6 MISSED MENSES: Primary | ICD-10-CM

## 2021-06-26 PROCEDURE — 81025 URINE PREGNANCY TEST: CPT | Performed by: ADVANCED PRACTICE MIDWIFE

## 2021-06-26 PROCEDURE — 3008F BODY MASS INDEX DOCD: CPT | Performed by: ADVANCED PRACTICE MIDWIFE

## 2021-06-26 PROCEDURE — 3078F DIAST BP <80 MM HG: CPT | Performed by: ADVANCED PRACTICE MIDWIFE

## 2021-06-26 PROCEDURE — 99214 OFFICE O/P EST MOD 30 MIN: CPT | Performed by: ADVANCED PRACTICE MIDWIFE

## 2021-06-26 PROCEDURE — 3074F SYST BP LT 130 MM HG: CPT | Performed by: ADVANCED PRACTICE MIDWIFE

## 2021-06-26 RX ORDER — CHOLECALCIFEROL (VITAMIN D3) 125 MCG
TABLET ORAL
COMMUNITY
End: 2021-09-01

## 2021-06-26 NOTE — PROGRESS NOTES
Pt is a  unplanned pregnancy but accepting  Started PNV  In a stable relationship x 4 years. Will be taking NCLEX in July then will tell family. Loss of appetite but no vomiting  Denies any spotting or pain since LMP  Very sure of LMP date.   1.  Disc

## 2021-07-12 ENCOUNTER — TELEPHONE (OUTPATIENT)
Dept: OBGYN CLINIC | Facility: CLINIC | Age: 26
End: 2021-07-12

## 2021-07-12 ENCOUNTER — TELEPHONE (OUTPATIENT)
Dept: FAMILY MEDICINE CLINIC | Facility: CLINIC | Age: 26
End: 2021-07-12

## 2021-07-12 ENCOUNTER — NURSE ONLY (OUTPATIENT)
Dept: OBGYN CLINIC | Facility: CLINIC | Age: 26
End: 2021-07-12
Payer: MEDICAID

## 2021-07-12 DIAGNOSIS — Z34.01 ENCOUNTER FOR SUPERVISION OF NORMAL FIRST PREGNANCY IN FIRST TRIMESTER: Primary | ICD-10-CM

## 2021-07-12 PROCEDURE — 99211 OFF/OP EST MAY X REQ PHY/QHP: CPT | Performed by: ADVANCED PRACTICE MIDWIFE

## 2021-07-12 NOTE — TELEPHONE ENCOUNTER
Spoke to [address] at pharmacy. The prenatal prescribed in June does not contain DHA. Was also informed that prenatal vitamins with DHA are mostly brand name and not covered by insurance.      Please advise on an alternative prenatal with DHA      ----- Message

## 2021-07-12 NOTE — PROGRESS NOTES
Phone OB RN education visit, educational material reviewed. Pt verbalized understanding. Orders placed for NOB labs including HCV. Patient has a tyroid disorder. Already has repeat testing order through pcp which patient will complete with initial labs.  P

## 2021-07-13 NOTE — TELEPHONE ENCOUNTER
Nature Made Prenatal Multivitamin + 200 mg DHA Softgels is one of the over the counter prenatal vitamins that has the [address] in it. Any of the prenatals that are chosen should have 200 mg dha or pt can add a dha supplement to current prenatal vitamins.

## 2021-07-30 ENCOUNTER — LAB ENCOUNTER (OUTPATIENT)
Dept: LAB | Age: 26
End: 2021-07-30
Attending: ADVANCED PRACTICE MIDWIFE
Payer: MEDICAID

## 2021-07-30 DIAGNOSIS — E03.9 ACQUIRED HYPOTHYROIDISM: ICD-10-CM

## 2021-07-30 DIAGNOSIS — E55.9 VITAMIN D DEFICIENCY: ICD-10-CM

## 2021-07-30 DIAGNOSIS — Z34.01 ENCOUNTER FOR SUPERVISION OF NORMAL FIRST PREGNANCY IN FIRST TRIMESTER: ICD-10-CM

## 2021-07-30 LAB
ANTIBODY SCREEN: NEGATIVE
BASOPHILS # BLD AUTO: 0.03 X10(3) UL (ref 0–0.2)
BASOPHILS NFR BLD AUTO: 0.5 %
DEPRECATED RDW RBC AUTO: 38.5 FL (ref 35.1–46.3)
EOSINOPHIL # BLD AUTO: 0.02 X10(3) UL (ref 0–0.7)
EOSINOPHIL NFR BLD AUTO: 0.3 %
ERYTHROCYTE [DISTWIDTH] IN BLOOD BY AUTOMATED COUNT: 12 % (ref 11–15)
HBV SURFACE AG SER-ACNC: <0.1 [IU]/L
HBV SURFACE AG SERPL QL IA: NONREACTIVE
HCT VFR BLD AUTO: 33 %
HCV AB SERPL QL IA: NONREACTIVE
HGB BLD-MCNC: 11.1 G/DL
IMM GRANULOCYTES # BLD AUTO: 0.02 X10(3) UL (ref 0–1)
IMM GRANULOCYTES NFR BLD: 0.3 %
LYMPHOCYTES # BLD AUTO: 1.42 X10(3) UL (ref 1–4)
LYMPHOCYTES NFR BLD AUTO: 21.9 %
MCH RBC QN AUTO: 29.7 PG (ref 26–34)
MCHC RBC AUTO-ENTMCNC: 33.6 G/DL (ref 31–37)
MCV RBC AUTO: 88.2 FL
MONOCYTES # BLD AUTO: 0.61 X10(3) UL (ref 0.1–1)
MONOCYTES NFR BLD AUTO: 9.4 %
NEUTROPHILS # BLD AUTO: 4.37 X10 (3) UL (ref 1.5–7.7)
NEUTROPHILS # BLD AUTO: 4.37 X10(3) UL (ref 1.5–7.7)
NEUTROPHILS NFR BLD AUTO: 67.6 %
PLATELET # BLD AUTO: 231 10(3)UL (ref 150–450)
RBC # BLD AUTO: 3.74 X10(6)UL
RH BLOOD TYPE: POSITIVE
RUBV IGG SER-ACNC: 8.9 IU/ML (ref 10–?)
TSI SER-ACNC: 1.49 MIU/ML (ref 0.36–3.74)
WBC # BLD AUTO: 6.5 X10(3) UL (ref 4–11)

## 2021-07-30 PROCEDURE — 85025 COMPLETE CBC W/AUTO DIFF WBC: CPT

## 2021-07-30 PROCEDURE — 87086 URINE CULTURE/COLONY COUNT: CPT

## 2021-07-30 PROCEDURE — 87389 HIV-1 AG W/HIV-1&-2 AB AG IA: CPT

## 2021-07-30 PROCEDURE — 86901 BLOOD TYPING SEROLOGIC RH(D): CPT

## 2021-07-30 PROCEDURE — 82306 VITAMIN D 25 HYDROXY: CPT

## 2021-07-30 PROCEDURE — 36415 COLL VENOUS BLD VENIPUNCTURE: CPT

## 2021-07-30 PROCEDURE — 84443 ASSAY THYROID STIM HORMONE: CPT

## 2021-07-30 PROCEDURE — 86850 RBC ANTIBODY SCREEN: CPT

## 2021-07-30 PROCEDURE — 86900 BLOOD TYPING SEROLOGIC ABO: CPT

## 2021-07-30 PROCEDURE — 86780 TREPONEMA PALLIDUM: CPT

## 2021-07-30 PROCEDURE — 86762 RUBELLA ANTIBODY: CPT

## 2021-07-30 PROCEDURE — 86803 HEPATITIS C AB TEST: CPT

## 2021-07-30 PROCEDURE — 87340 HEPATITIS B SURFACE AG IA: CPT

## 2021-08-02 ENCOUNTER — INITIAL PRENATAL (OUTPATIENT)
Dept: OBGYN CLINIC | Facility: CLINIC | Age: 26
End: 2021-08-02
Payer: MEDICAID

## 2021-08-02 VITALS — DIASTOLIC BLOOD PRESSURE: 67 MMHG | SYSTOLIC BLOOD PRESSURE: 100 MMHG | WEIGHT: 98 LBS | BODY MASS INDEX: 19 KG/M2

## 2021-08-02 DIAGNOSIS — Z34.01 ENCOUNTER FOR SUPERVISION OF NORMAL FIRST PREGNANCY IN FIRST TRIMESTER: Primary | ICD-10-CM

## 2021-08-02 LAB — T PALLIDUM AB SER QL: NEGATIVE

## 2021-08-02 PROCEDURE — 3074F SYST BP LT 130 MM HG: CPT | Performed by: ADVANCED PRACTICE MIDWIFE

## 2021-08-02 PROCEDURE — 0500F INITIAL PRENATAL CARE VISIT: CPT | Performed by: ADVANCED PRACTICE MIDWIFE

## 2021-08-02 PROCEDURE — 3078F DIAST BP <80 MM HG: CPT | Performed by: ADVANCED PRACTICE MIDWIFE

## 2021-08-03 LAB — VIT D+METAB SERPL-MCNC: 33.7 NG/ML (ref 30–100)

## 2021-08-04 ENCOUNTER — TELEPHONE (OUTPATIENT)
Dept: OBGYN CLINIC | Facility: CLINIC | Age: 26
End: 2021-08-04

## 2021-08-04 PROBLEM — Z00.00 WELL ADULT EXAM: Status: RESOLVED | Noted: 2019-06-03 | Resolved: 2021-08-04

## 2021-08-04 NOTE — PROGRESS NOTES
Discussed equivocal MMR. Starting residency program. Recent pap 3/21 external gyne exam only. Has FTS scheduled. RTC 4 weeks.

## 2021-08-04 NOTE — PROGRESS NOTES
Outpatient Maternal-Fetal Medicine First Trimester Screen    Thank you for requesting an ultrasound on your patient Jinny Dangelo.   As you are aware she is a 23year old year old female  with a Kramer pregnancy an Estimated Date of Delivery only encounter (no physician visit). The ultrasound was read by Dr. Kelsey Kumar and the report was sent to  Ms. Hernadez to discuss with the patient.

## 2021-08-04 NOTE — TELEPHONE ENCOUNTER
----- Message from Marie Franz CNM sent at 8/4/2021  9:01 AM CDT -----  Normal vitamin D level on labs. Please call to inform.

## 2021-08-06 ENCOUNTER — HOSPITAL ENCOUNTER (OUTPATIENT)
Dept: PERINATAL CARE | Facility: HOSPITAL | Age: 26
Discharge: HOME | End: 2021-08-06
Attending: ADVANCED PRACTICE MIDWIFE
Payer: MEDICAID

## 2021-08-06 ENCOUNTER — HOSPITAL ENCOUNTER (OUTPATIENT)
Dept: PERINATAL CARE | Facility: HOSPITAL | Age: 26
Discharge: HOME | End: 2021-08-06
Attending: OBSTETRICS & GYNECOLOGY
Payer: MEDICAID

## 2021-08-06 VITALS
DIASTOLIC BLOOD PRESSURE: 62 MMHG | BODY MASS INDEX: 19 KG/M2 | WEIGHT: 98 LBS | SYSTOLIC BLOOD PRESSURE: 96 MMHG | HEART RATE: 90 BPM

## 2021-08-06 DIAGNOSIS — Z36.9 FIRST TRIMESTER SCREENING: ICD-10-CM

## 2021-08-06 DIAGNOSIS — Z36.9 1ST TRIMESTER SCREENING: ICD-10-CM

## 2021-08-06 DIAGNOSIS — Z36.9 FIRST TRIMESTER SCREENING: Primary | ICD-10-CM

## 2021-08-06 PROCEDURE — 76813 OB US NUCHAL MEAS 1 GEST: CPT | Performed by: OBSTETRICS & GYNECOLOGY

## 2021-08-10 ENCOUNTER — TELEPHONE (OUTPATIENT)
Dept: PERINATAL CARE | Facility: HOSPITAL | Age: 26
End: 2021-08-10

## 2021-08-10 NOTE — TELEPHONE ENCOUNTER
Pt 1st trimester screen results obt  Reviewed By DR Radha Swan  Low risk for trisomy 18/13/21  Less than 1 in 65122 risk for trisomy 18/13/21    Report sent for scanning into pt record

## 2021-08-24 RX ORDER — LEVOTHYROXINE SODIUM 0.03 MG/1
TABLET ORAL
Qty: 90 TABLET | Refills: 1 | Status: SHIPPED | OUTPATIENT
Start: 2021-08-24 | End: 2021-09-14

## 2021-09-01 ENCOUNTER — ROUTINE PRENATAL (OUTPATIENT)
Dept: OBGYN CLINIC | Facility: CLINIC | Age: 26
End: 2021-09-01
Payer: MEDICAID

## 2021-09-01 ENCOUNTER — LAB ENCOUNTER (OUTPATIENT)
Dept: LAB | Facility: HOSPITAL | Age: 26
End: 2021-09-01
Attending: ADVANCED PRACTICE MIDWIFE
Payer: MEDICAID

## 2021-09-01 VITALS
SYSTOLIC BLOOD PRESSURE: 115 MMHG | HEART RATE: 86 BPM | WEIGHT: 101.38 LBS | DIASTOLIC BLOOD PRESSURE: 76 MMHG | BODY MASS INDEX: 19 KG/M2

## 2021-09-01 DIAGNOSIS — Z34.02 ENCOUNTER FOR SUPERVISION OF NORMAL FIRST PREGNANCY IN SECOND TRIMESTER: ICD-10-CM

## 2021-09-01 DIAGNOSIS — Z34.02 ENCOUNTER FOR SUPERVISION OF NORMAL FIRST PREGNANCY IN SECOND TRIMESTER: Primary | ICD-10-CM

## 2021-09-01 LAB
APPEARANCE: CLEAR
BILIRUBIN: NEGATIVE
GLUCOSE (URINE DIPSTICK): NEGATIVE MG/DL
KETONES (URINE DIPSTICK): NEGATIVE MG/DL
MULTISTIX LOT#: 5077 NUMERIC
NITRITE, URINE: NEGATIVE
OCCULT BLOOD: NEGATIVE
PH, URINE: 6.5 (ref 4.5–8)
PROTEIN (URINE DIPSTICK): NEGATIVE MG/DL
SPECIFIC GRAVITY: 1.01 (ref 1–1.03)
URINE-COLOR: YELLOW
UROBILINOGEN,SEMI-QN: 0.2 MG/DL (ref 0–1.9)

## 2021-09-01 PROCEDURE — 81002 URINALYSIS NONAUTO W/O SCOPE: CPT | Performed by: ADVANCED PRACTICE MIDWIFE

## 2021-09-01 PROCEDURE — 82105 ALPHA-FETOPROTEIN SERUM: CPT

## 2021-09-01 PROCEDURE — 0502F SUBSEQUENT PRENATAL CARE: CPT | Performed by: ADVANCED PRACTICE MIDWIFE

## 2021-09-01 PROCEDURE — 36415 COLL VENOUS BLD VENIPUNCTURE: CPT

## 2021-09-01 PROCEDURE — 3078F DIAST BP <80 MM HG: CPT | Performed by: ADVANCED PRACTICE MIDWIFE

## 2021-09-01 PROCEDURE — 3074F SYST BP LT 130 MM HG: CPT | Performed by: ADVANCED PRACTICE MIDWIFE

## 2021-09-04 LAB
AFP SMOKING: NO
FAMILY HX NEURAL TUBE DEFECT: NO
INSULIN REQ MATERNAL DIABETES: NO
MATERNAL AGE OF DELIVERY: 25.8 YR
MOM FOR AFP: 1.18
PATIENT'S AFP: 54 NG/ML

## 2021-09-07 ENCOUNTER — TELEPHONE (OUTPATIENT)
Dept: PERINATAL CARE | Facility: HOSPITAL | Age: 26
End: 2021-09-07

## 2021-09-07 NOTE — TELEPHONE ENCOUNTER
Patient stopped in to schedule Level 1 ultrasound. No appointments available during her work schedule. Informed patient that her order is for a Level 1 and can be done in Radiology at [facility] discretion.   Patient left without scheduling

## 2021-09-10 ENCOUNTER — TELEPHONE (OUTPATIENT)
Dept: PERINATAL CARE | Facility: HOSPITAL | Age: 26
End: 2021-09-10

## 2021-09-14 RX ORDER — LEVOTHYROXINE SODIUM 0.03 MG/1
25 TABLET ORAL
Qty: 90 TABLET | Refills: 1 | Status: SHIPPED | OUTPATIENT
Start: 2021-09-14 | End: 2021-12-27

## 2021-09-14 NOTE — TELEPHONE ENCOUNTER
Refill passed per [facility] protocol.   Requested Prescriptions   Pending Prescriptions Disp Refills    LEVOTHYROXINE 25 MCG Oral Tab [Pharmacy Med Name: LEVOTHYROXINE 0.025MG (25MCG) TAB] 90 tablet 1     Sig: TAKE 1 TABLET(25 MCG) BY MOUTH BEFORE BREAKFAST        Thyroid Medication Protocol Passed - 9/13/2021  7:58 PM        Passed - TSH in past 12 months        Passed - Last TSH value is normal     Lab Results   Component Value Date    TSH 1.490 07/30/2021                 Passed - Appointment in past 12 or next 3 months               Recent Outpatient Visits              1 week ago Encounter for supervision of normal first pregnancy in second trimester    [facility], [address], Jesse Douglas CN    Routine Prenatal    1 month ago Wellness examination    [address]    Nurse Only    1 month ago Encounter for supervision of normal first pregnancy in first trimester    [facility], [address], [facility], [facility]    Initial Prenatal    2 months ago Encounter for supervision of normal first pregnancy in first trimester    [facility], [address], [address] Only    2 months ago Missed menses    [facility] [place], [address], Jesse Douglas [place]    Office Visit            Future Appointments         Provider Department Appt Notes    In 2 weeks Israel Goff [facility] Maternal Fetal Medicine NO CONSULT/LEVEL 1  auth req'd 9/10/21

## 2021-09-16 RX ORDER — LEVOTHYROXINE SODIUM 0.03 MG/1
25 TABLET ORAL
Qty: 90 TABLET | Refills: 1 | OUTPATIENT
Start: 2021-09-16

## 2021-09-23 NOTE — PROGRESS NOTES
OB ULTRASOUND REPORT    Encounter for a level I ultrasound at the request of Ms. Hernadez. See imaging tab for complete ultrasound report or in PACS    Ultrasound Findings:  Single IUP in cephalic presentation. Placenta is posterior.    A 3 vessel cord

## 2021-09-27 ENCOUNTER — PATIENT MESSAGE (OUTPATIENT)
Dept: OBGYN CLINIC | Facility: CLINIC | Age: 26
End: 2021-09-27

## 2021-09-30 ENCOUNTER — HOSPITAL ENCOUNTER (OUTPATIENT)
Dept: PERINATAL CARE | Facility: HOSPITAL | Age: 26
Discharge: HOME | End: 2021-09-30
Attending: ADVANCED PRACTICE MIDWIFE
Payer: COMMERCIAL

## 2021-09-30 ENCOUNTER — HOSPITAL ENCOUNTER (OUTPATIENT)
Dept: PERINATAL CARE | Facility: HOSPITAL | Age: 26
Discharge: HOME | End: 2021-09-30
Attending: OBSTETRICS & GYNECOLOGY
Payer: COMMERCIAL

## 2021-09-30 VITALS
WEIGHT: 103 LBS | BODY MASS INDEX: 19 KG/M2 | DIASTOLIC BLOOD PRESSURE: 60 MMHG | HEART RATE: 81 BPM | SYSTOLIC BLOOD PRESSURE: 105 MMHG

## 2021-09-30 DIAGNOSIS — Z36.89 ENCOUNTER FOR FETAL ANATOMIC SURVEY: Primary | ICD-10-CM

## 2021-09-30 DIAGNOSIS — Z36.89 ENCOUNTER FOR FETAL ANATOMIC SURVEY: ICD-10-CM

## 2021-09-30 PROCEDURE — 76805 OB US >/= 14 WKS SNGL FETUS: CPT | Performed by: OBSTETRICS & GYNECOLOGY

## 2021-10-04 ENCOUNTER — TELEPHONE (OUTPATIENT)
Dept: OBGYN CLINIC | Facility: CLINIC | Age: 26
End: 2021-10-04

## 2021-10-04 NOTE — TELEPHONE ENCOUNTER
Please call : 1.  IUP at 20w3d   2.  Normal fetal anatomy   3.  Biometric measurements are consistent with dates

## 2021-10-04 NOTE — TELEPHONE ENCOUNTER
Notified pt of results per MBW. Next PN appt scheduled. Discussed flu vaccine & covid vaccine booster.  Pt verbalized an understanding & agrees w/ plan

## 2021-10-07 ENCOUNTER — ROUTINE PRENATAL (OUTPATIENT)
Dept: OBGYN CLINIC | Facility: CLINIC | Age: 26
End: 2021-10-07
Payer: MEDICAID

## 2021-10-07 ENCOUNTER — LAB ENCOUNTER (OUTPATIENT)
Dept: LAB | Facility: HOSPITAL | Age: 26
End: 2021-10-07
Attending: ADVANCED PRACTICE MIDWIFE
Payer: COMMERCIAL

## 2021-10-07 VITALS
HEART RATE: 73 BPM | WEIGHT: 106.63 LBS | DIASTOLIC BLOOD PRESSURE: 56 MMHG | SYSTOLIC BLOOD PRESSURE: 93 MMHG | BODY MASS INDEX: 20 KG/M2

## 2021-10-07 DIAGNOSIS — Z34.91 ENCOUNTER FOR SUPERVISION OF NORMAL PREGNANCY IN FIRST TRIMESTER, UNSPECIFIED GRAVIDITY: Primary | ICD-10-CM

## 2021-10-07 DIAGNOSIS — E03.9 HYPOTHYROIDISM, UNSPECIFIED TYPE: ICD-10-CM

## 2021-10-07 DIAGNOSIS — Z23 ENCOUNTER FOR IMMUNIZATION: ICD-10-CM

## 2021-10-07 PROCEDURE — 90686 IIV4 VACC NO PRSV 0.5 ML IM: CPT | Performed by: ADVANCED PRACTICE MIDWIFE

## 2021-10-07 PROCEDURE — 84443 ASSAY THYROID STIM HORMONE: CPT | Performed by: ADVANCED PRACTICE MIDWIFE

## 2021-10-07 PROCEDURE — 0502F SUBSEQUENT PRENATAL CARE: CPT | Performed by: ADVANCED PRACTICE MIDWIFE

## 2021-10-07 PROCEDURE — 3078F DIAST BP <80 MM HG: CPT | Performed by: ADVANCED PRACTICE MIDWIFE

## 2021-10-07 PROCEDURE — 90471 IMMUNIZATION ADMIN: CPT | Performed by: ADVANCED PRACTICE MIDWIFE

## 2021-10-07 PROCEDURE — 3074F SYST BP LT 130 MM HG: CPT | Performed by: ADVANCED PRACTICE MIDWIFE

## 2021-10-07 PROCEDURE — 36415 COLL VENOUS BLD VENIPUNCTURE: CPT | Performed by: ADVANCED PRACTICE MIDWIFE

## 2021-10-07 PROCEDURE — 81002 URINALYSIS NONAUTO W/O SCOPE: CPT | Performed by: ADVANCED PRACTICE MIDWIFE

## 2021-10-07 NOTE — PROGRESS NOTES
Feeling well. Has started feeling some movement. Denies LOF, vaginal bleeding, contractions. Requests TSH check as has not been checked since 7/30. Reviewed warning signs and when to call. Flu shot today.  TAHIRA 4wks

## 2021-11-11 ENCOUNTER — ROUTINE PRENATAL (OUTPATIENT)
Dept: OBGYN CLINIC | Facility: CLINIC | Age: 26
End: 2021-11-11
Payer: COMMERCIAL

## 2021-11-11 VITALS
HEART RATE: 80 BPM | SYSTOLIC BLOOD PRESSURE: 96 MMHG | BODY MASS INDEX: 21 KG/M2 | DIASTOLIC BLOOD PRESSURE: 62 MMHG | WEIGHT: 112 LBS

## 2021-11-11 DIAGNOSIS — Z34.02 ENCOUNTER FOR SUPERVISION OF NORMAL FIRST PREGNANCY IN SECOND TRIMESTER: Primary | ICD-10-CM

## 2021-11-11 DIAGNOSIS — E03.9 HYPOTHYROIDISM, UNSPECIFIED TYPE: ICD-10-CM

## 2021-11-11 PROCEDURE — 3078F DIAST BP <80 MM HG: CPT | Performed by: ADVANCED PRACTICE MIDWIFE

## 2021-11-11 PROCEDURE — 81002 URINALYSIS NONAUTO W/O SCOPE: CPT | Performed by: ADVANCED PRACTICE MIDWIFE

## 2021-11-11 PROCEDURE — 3074F SYST BP LT 130 MM HG: CPT | Performed by: ADVANCED PRACTICE MIDWIFE

## 2021-11-11 NOTE — PROGRESS NOTES
Growth sono ordered. Reviewed danger signs. Info on virtual CBE classes & online options. 3rd trimester labs given.

## 2021-11-17 ENCOUNTER — LAB ENCOUNTER (OUTPATIENT)
Dept: LAB | Facility: HOSPITAL | Age: 26
End: 2021-11-17
Attending: ADVANCED PRACTICE MIDWIFE
Payer: COMMERCIAL

## 2021-11-17 DIAGNOSIS — E03.9 HYPOTHYROIDISM, UNSPECIFIED TYPE: ICD-10-CM

## 2021-11-17 DIAGNOSIS — Z34.02 ENCOUNTER FOR SUPERVISION OF NORMAL FIRST PREGNANCY IN SECOND TRIMESTER: ICD-10-CM

## 2021-11-17 PROCEDURE — 85027 COMPLETE CBC AUTOMATED: CPT | Performed by: ADVANCED PRACTICE MIDWIFE

## 2021-11-17 PROCEDURE — 82950 GLUCOSE TEST: CPT | Performed by: ADVANCED PRACTICE MIDWIFE

## 2021-11-17 PROCEDURE — 86780 TREPONEMA PALLIDUM: CPT | Performed by: ADVANCED PRACTICE MIDWIFE

## 2021-11-17 PROCEDURE — 36415 COLL VENOUS BLD VENIPUNCTURE: CPT | Performed by: ADVANCED PRACTICE MIDWIFE

## 2021-11-17 PROCEDURE — 84443 ASSAY THYROID STIM HORMONE: CPT

## 2021-11-17 PROCEDURE — 87389 HIV-1 AG W/HIV-1&-2 AB AG IA: CPT | Performed by: ADVANCED PRACTICE MIDWIFE

## 2021-11-24 ENCOUNTER — TELEPHONE (OUTPATIENT)
Dept: OBGYN CLINIC | Facility: CLINIC | Age: 26
End: 2021-11-24

## 2021-11-24 NOTE — TELEPHONE ENCOUNTER
----- Message from Buddy Morales CNM sent at 11/24/2021 11:01 AM CST -----  Normal 3rd tri lab results including HIV.

## 2021-11-26 NOTE — TELEPHONE ENCOUNTER
Spoke with pt advised of normal 3rd trimester labs and negative HIV per MBW. Pt agreed and voiced understanding.

## 2021-12-16 ENCOUNTER — ROUTINE PRENATAL (OUTPATIENT)
Dept: OBGYN CLINIC | Facility: CLINIC | Age: 26
End: 2021-12-16
Payer: COMMERCIAL

## 2021-12-16 VITALS
WEIGHT: 119 LBS | SYSTOLIC BLOOD PRESSURE: 99 MMHG | BODY MASS INDEX: 22 KG/M2 | DIASTOLIC BLOOD PRESSURE: 62 MMHG | HEART RATE: 84 BPM

## 2021-12-16 DIAGNOSIS — Z34.02 ENCOUNTER FOR SUPERVISION OF NORMAL FIRST PREGNANCY IN SECOND TRIMESTER: Primary | ICD-10-CM

## 2021-12-16 PROCEDURE — 3074F SYST BP LT 130 MM HG: CPT | Performed by: ADVANCED PRACTICE MIDWIFE

## 2021-12-16 PROCEDURE — 3078F DIAST BP <80 MM HG: CPT | Performed by: ADVANCED PRACTICE MIDWIFE

## 2021-12-16 PROCEDURE — 81002 URINALYSIS NONAUTO W/O SCOPE: CPT | Performed by: ADVANCED PRACTICE MIDWIFE

## 2021-12-16 NOTE — PROGRESS NOTES
Feeling well. Endorses regular fetal movement. Denies LOF, vaginal bleeding, contractions. Reports bilateral calf cramping yesterday. Has mild tenderness on left calf today. No redness, heat, swelling on exam. Discussed risk of blood clots.  Would likely no

## 2021-12-28 RX ORDER — LEVOTHYROXINE SODIUM 0.03 MG/1
25 TABLET ORAL
Qty: 90 TABLET | Refills: 1 | Status: SHIPPED | OUTPATIENT
Start: 2021-12-28 | End: 2022-01-14

## 2021-12-29 ENCOUNTER — MOBILE ENCOUNTER (OUTPATIENT)
Dept: OBGYN CLINIC | Facility: CLINIC | Age: 26
End: 2021-12-29

## 2021-12-29 ENCOUNTER — TELEPHONE (OUTPATIENT)
Dept: OBGYN CLINIC | Facility: CLINIC | Age: 26
End: 2021-12-29

## 2021-12-29 DIAGNOSIS — Z20.822 COVID-19 RULED OUT: Primary | ICD-10-CM

## 2021-12-29 LAB — SARS-COV-2 RNA RESP QL NAA+PROBE: DETECTED

## 2021-12-29 NOTE — TELEPHONE ENCOUNTER
Pt states spouse Is COVID positive and she started with symptoms of a sore throat, headache, chills and weakness in her legs, pt is 33w2d

## 2021-12-29 NOTE — TELEPHONE ENCOUNTER
Spoke to patient, complaining of sore throat, chills, cold, congestion, dry cough and muscle weakness. Patient stated she has been checking her O2 sat and have been running 94-97 % O2, and . Patient stated regular fetal movements.  Patient's  t

## 2021-12-30 ENCOUNTER — TELEMEDICINE (OUTPATIENT)
Dept: OBGYN CLINIC | Facility: CLINIC | Age: 26
End: 2021-12-30

## 2021-12-30 ENCOUNTER — HOSPITAL ENCOUNTER (OUTPATIENT)
Facility: HOSPITAL | Age: 26
Setting detail: OBSERVATION
Discharge: HOME | End: 2021-12-30
Attending: HOSPITALIST | Admitting: HOSPITALIST
Payer: COMMERCIAL

## 2021-12-30 VITALS
SYSTOLIC BLOOD PRESSURE: 101 MMHG | TEMPERATURE: 98 F | HEIGHT: 61 IN | OXYGEN SATURATION: 96 % | WEIGHT: 121.5 LBS | HEART RATE: 121 BPM | DIASTOLIC BLOOD PRESSURE: 46 MMHG | BODY MASS INDEX: 22.94 KG/M2 | RESPIRATION RATE: 16 BRPM

## 2021-12-30 DIAGNOSIS — I95.9 HYPOTENSION, UNSPECIFIED HYPOTENSION TYPE: ICD-10-CM

## 2021-12-30 DIAGNOSIS — Z34.03 ENCOUNTER FOR SUPERVISION OF NORMAL FIRST PREGNANCY IN THIRD TRIMESTER: Primary | ICD-10-CM

## 2021-12-30 DIAGNOSIS — R00.0 TACHYCARDIA: ICD-10-CM

## 2021-12-30 DIAGNOSIS — U07.1 COVID-19 VIRUS INFECTION: Primary | ICD-10-CM

## 2021-12-30 LAB
ALBUMIN SERPL-MCNC: 2.5 G/DL (ref 3.4–5)
ALBUMIN SERPL-MCNC: 2.7 G/DL (ref 3.4–5)
ALBUMIN/GLOB SERPL: 0.8 {RATIO} (ref 1–2)
ALP LIVER SERPL-CCNC: 129 U/L
ALP LIVER SERPL-CCNC: 144 U/L
ALT SERPL-CCNC: 17 U/L
ALT SERPL-CCNC: 19 U/L
ANION GAP SERPL CALC-SCNC: 10 MMOL/L (ref 0–18)
ANION GAP SERPL CALC-SCNC: 11 MMOL/L (ref 0–18)
APTT PPP: 29 SECONDS (ref 23.3–35.6)
AST SERPL-CCNC: 22 U/L (ref 15–37)
AST SERPL-CCNC: 24 U/L (ref 15–37)
BASOPHILS # BLD: 0 X10(3) UL (ref 0–0.2)
BASOPHILS NFR BLD: 0 %
BILIRUB DIRECT SERPL-MCNC: 0.2 MG/DL (ref 0–0.2)
BILIRUB SERPL-MCNC: 0.4 MG/DL (ref 0.1–2)
BILIRUB SERPL-MCNC: 0.5 MG/DL (ref 0.1–2)
BILIRUB UR QL: NEGATIVE
BUN BLD-MCNC: 6 MG/DL (ref 7–18)
BUN BLD-MCNC: 6 MG/DL (ref 7–18)
BUN/CREAT SERPL: 10.9 (ref 10–20)
BUN/CREAT SERPL: 9.8 (ref 10–20)
CALCIUM BLD-MCNC: 8.3 MG/DL (ref 8.5–10.1)
CALCIUM BLD-MCNC: 8.9 MG/DL (ref 8.5–10.1)
CHLORIDE SERPL-SCNC: 103 MMOL/L (ref 98–112)
CHLORIDE SERPL-SCNC: 106 MMOL/L (ref 98–112)
CO2 SERPL-SCNC: 23 MMOL/L (ref 21–32)
CO2 SERPL-SCNC: 24 MMOL/L (ref 21–32)
COLOR UR: YELLOW
CREAT BLD-MCNC: 0.55 MG/DL
CREAT BLD-MCNC: 0.61 MG/DL
DEPRECATED RDW RBC AUTO: 46.7 FL (ref 35.1–46.3)
EOSINOPHIL # BLD: 0 X10(3) UL (ref 0–0.7)
EOSINOPHIL NFR BLD: 0 %
ERYTHROCYTE [DISTWIDTH] IN BLOOD BY AUTOMATED COUNT: 13 % (ref 11–15)
GLOBULIN PLAS-MCNC: 3.3 G/DL (ref 2.8–4.4)
GLUCOSE BLD-MCNC: 125 MG/DL (ref 70–99)
GLUCOSE BLD-MCNC: 96 MG/DL (ref 70–99)
GLUCOSE UR-MCNC: NEGATIVE MG/DL
HCT VFR BLD AUTO: 31.8 %
HGB BLD-MCNC: 10.5 G/DL
HGB UR QL STRIP.AUTO: NEGATIVE
INR BLD: 0.9 (ref 0.8–1.2)
KETONES UR-MCNC: 80 MG/DL
LEUKOCYTE ESTERASE UR QL STRIP.AUTO: NEGATIVE
LYMPHOCYTES NFR BLD: 0.22 X10(3) UL (ref 1–4)
LYMPHOCYTES NFR BLD: 2 %
MCH RBC QN AUTO: 32.1 PG (ref 26–34)
MCHC RBC AUTO-ENTMCNC: 33 G/DL (ref 31–37)
MCV RBC AUTO: 97.2 FL
METAMYELOCYTES # BLD: 0.11 X10(3) UL
METAMYELOCYTES NFR BLD: 1 %
MONOCYTES # BLD: 0.65 X10(3) UL (ref 0.1–1)
MONOCYTES NFR BLD: 6 %
MORPHOLOGY: NORMAL
NEUTROPHILS # BLD AUTO: 8.87 X10 (3) UL (ref 1.5–7.7)
NEUTROPHILS NFR BLD: 88 %
NEUTS BAND NFR BLD: 3 %
NEUTS HYPERSEG # BLD: 9.92 X10(3) UL (ref 1.5–7.7)
NITRITE UR QL STRIP.AUTO: NEGATIVE
OSMOLALITY SERPL CALC.SUM OF ELEC: 281 MOSM/KG (ref 275–295)
OSMOLALITY SERPL CALC.SUM OF ELEC: 289 MOSM/KG (ref 275–295)
PH UR: 5 [PH] (ref 5–8)
PLATELET # BLD AUTO: 155 10(3)UL (ref 150–450)
PLATELET MORPHOLOGY: NORMAL
POTASSIUM SERPL-SCNC: 3.2 MMOL/L (ref 3.5–5.1)
POTASSIUM SERPL-SCNC: 3.3 MMOL/L (ref 3.5–5.1)
PROT SERPL-MCNC: 5.8 G/DL (ref 6.4–8.2)
PROT SERPL-MCNC: 6.8 G/DL (ref 6.4–8.2)
PROT UR-MCNC: NEGATIVE MG/DL
PROTHROMBIN TIME: 12.3 SECONDS (ref 11.6–14.8)
RBC # BLD AUTO: 3.27 X10(6)UL
SODIUM SERPL-SCNC: 137 MMOL/L (ref 136–145)
SODIUM SERPL-SCNC: 140 MMOL/L (ref 136–145)
SP GR UR STRIP: 1.01 (ref 1–1.03)
TOTAL CELLS COUNTED BLD: 100
UROBILINOGEN UR STRIP-ACNC: 2
WBC # BLD AUTO: 10.9 X10(3) UL (ref 4–11)

## 2021-12-30 PROCEDURE — 3078F DIAST BP <80 MM HG: CPT | Performed by: HOSPITALIST

## 2021-12-30 PROCEDURE — 59025 FETAL NON-STRESS TEST: CPT | Performed by: ADVANCED PRACTICE MIDWIFE

## 2021-12-30 PROCEDURE — 3008F BODY MASS INDEX DOCD: CPT | Performed by: HOSPITALIST

## 2021-12-30 PROCEDURE — 99219 INITIAL OBSERVATION CARE,LEVL II: CPT | Performed by: HOSPITALIST

## 2021-12-30 PROCEDURE — 3074F SYST BP LT 130 MM HG: CPT | Performed by: HOSPITALIST

## 2021-12-30 RX ORDER — LEVOTHYROXINE SODIUM 0.03 MG/1
25 TABLET ORAL
Status: DISCONTINUED | OUTPATIENT
Start: 2021-12-30 | End: 2021-12-30

## 2021-12-30 RX ORDER — CHOLECALCIFEROL (VITAMIN D3) 25 MCG
1 TABLET,CHEWABLE ORAL DAILY
Status: DISCONTINUED | OUTPATIENT
Start: 2021-12-30 | End: 2021-12-30

## 2021-12-30 RX ORDER — DEXAMETHASONE SODIUM PHOSPHATE 4 MG/ML
6 VIAL (ML) INJECTION ONCE
Status: COMPLETED | OUTPATIENT
Start: 2021-12-30 | End: 2021-12-30

## 2021-12-30 RX ORDER — ACETAMINOPHEN 500 MG
1000 TABLET ORAL ONCE
Status: COMPLETED | OUTPATIENT
Start: 2021-12-30 | End: 2021-12-30

## 2021-12-30 RX ORDER — POTASSIUM CHLORIDE 20 MEQ/1
40 TABLET, EXTENDED RELEASE ORAL ONCE
Status: COMPLETED | OUTPATIENT
Start: 2021-12-30 | End: 2021-12-30

## 2021-12-30 RX ORDER — SODIUM CHLORIDE, SODIUM LACTATE, POTASSIUM CHLORIDE, CALCIUM CHLORIDE 600; 310; 30; 20 MG/100ML; MG/100ML; MG/100ML; MG/100ML
INJECTION, SOLUTION INTRAVENOUS CONTINUOUS
Status: DISCONTINUED | OUTPATIENT
Start: 2021-12-30 | End: 2021-12-30

## 2021-12-30 RX ORDER — CODEINE PHOSPHATE AND GUAIFENESIN 10; 100 MG/5ML; MG/5ML
5 SOLUTION ORAL EVERY 4 HOURS PRN
Status: DISCONTINUED | OUTPATIENT
Start: 2021-12-30 | End: 2021-12-30

## 2021-12-30 RX ORDER — PANTOPRAZOLE SODIUM 40 MG/1
40 TABLET, DELAYED RELEASE ORAL
Status: DISCONTINUED | OUTPATIENT
Start: 2021-12-30 | End: 2021-12-30

## 2021-12-30 RX ORDER — ONDANSETRON 2 MG/ML
4 INJECTION INTRAMUSCULAR; INTRAVENOUS EVERY 6 HOURS PRN
Status: DISCONTINUED | OUTPATIENT
Start: 2021-12-30 | End: 2021-12-30

## 2021-12-30 RX ORDER — MAGNESIUM HYDROXIDE/ALUMINUM HYDROXICE/SIMETHICONE 120; 1200; 1200 MG/30ML; MG/30ML; MG/30ML
30 SUSPENSION ORAL 4 TIMES DAILY PRN
Status: DISCONTINUED | OUTPATIENT
Start: 2021-12-30 | End: 2021-12-30

## 2021-12-30 RX ORDER — ENOXAPARIN SODIUM 100 MG/ML
40 INJECTION SUBCUTANEOUS DAILY
Status: DISCONTINUED | OUTPATIENT
Start: 2021-12-30 | End: 2021-12-30

## 2021-12-30 RX ORDER — ENOXAPARIN SODIUM 100 MG/ML
40 INJECTION SUBCUTANEOUS ONCE
Status: COMPLETED | OUTPATIENT
Start: 2021-12-30 | End: 2021-12-30

## 2021-12-30 RX ORDER — ACETAMINOPHEN 325 MG/1
650 TABLET ORAL EVERY 6 HOURS PRN
Status: DISCONTINUED | OUTPATIENT
Start: 2021-12-30 | End: 2021-12-30

## 2021-12-30 RX ORDER — SODIUM CHLORIDE 9 MG/ML
INJECTION, SOLUTION INTRAVENOUS CONTINUOUS
Status: ACTIVE | OUTPATIENT
Start: 2021-12-30 | End: 2021-12-30

## 2021-12-30 NOTE — DISCHARGE SUMMARY
[facility]  Discharge Summary     Guru Stubbs  : 1995    Status: Observation  Day #: 0    Attending: Rishi Marrero MD  PCP: KATARZYNA Bustos     Date of Admission:  2021  Date of Discharge:  2021     Hospital Disc Medium Risk  0-28   Low Risk. TCM Follow-Up Recommendation:  LACE < 29: Low Risk of readmission after discharge from the hospital. No TCM follow-up needed.             Elena Pacheco MD

## 2021-12-30 NOTE — PLAN OF CARE
Pt A&Ox4, +covid, 33weeks pregnant. Hypotensive and NST upon arrival to unit. 1L 0.9 NS bolus given and LR started at 100 ml/hr. No fever during this RN shift. Ice pack given for mild headache. Call light within reach.      Problem: Patient Centered Care  G

## 2021-12-30 NOTE — PROGRESS NOTES
This RN called to ED to monitor pt. For NST due to decreased fetal movement. Pt. Positive for Covid. NST reactive. FHR Baseline 150 with accels present. Uterine irritability noted. Pt. Coughing at intervals.    Ron Mtz CNM contacted and provided wi

## 2021-12-30 NOTE — ED QUICK NOTES
Orders for admission, patient is aware of plan and ready to go upstairs. Any questions, please call ED CARSON Olmedo  at extension [phone].    Type of COVID test sent:rapid  COVID Suspicion level: High  covid +  Titratable drug(s) infusing:none  Rate:    LOC at

## 2021-12-30 NOTE — NST
Nonstress Test   Patient: Saida Mckenzie    Gestation: 33w3d    NST: Covid +       Variability: Moderate           Accelerations: Yes           Decelerations: None            Baseline: 125 BPM           Uterine Irritability: No           Contractions: Not

## 2021-12-30 NOTE — PROGRESS NOTES
Vivi Mccray had a previously scheduled video visit with me. She connected from her hospital room. She reports that she is feeling much better today. She has felt fetal movement throughout the morning. Denies LOF, vaginal bleeding, contractions.  She already had a t

## 2021-12-30 NOTE — CONSULTS
Whit Patient Status:  Observation    1995 MRN [id number]   Location [facility] Attending Dorothea Retana MD   Hosp Day # 0 PCP KATARZYNA Koch     SUBJECTIVE:  Reason for Admission: Review of Systems:  Unremarkable except as above    OBJECTIVE:  Temp:  [98.2 °F (36.8 °C)-100.3 °F (37.9 °C)] 98.2 °F (36.8 °C)  Pulse:  [118-135] 121  Resp:  [16-31] 16  BP: ()/(34-54) 89/37    Intake/Output Summary (Last 24 hours) at 12/30/2021 diagnostic testing and office locations for any needed care while they are sick or under investigation for the COVID-19 virus. The virus is not vertically transmitted to the fetus and does not place the fetus at risk for direct harm.   There are case rep

## 2021-12-30 NOTE — CONSULTS
[facility] ID CONSULT NOTE    Hadley Abler Patient Status:  Observation    1995 MRN [id number]   Location [facility] Attending Suresh Venegas MD   Hosp Day # 0 PCP KATARZYNA Guajardo       Reason for [facility] SERVICES, [facility] capsule, 1 capsule, Oral, Daily  •  ondansetron (ZOFRAN) injection 4 mg, 4 mg, Intravenous, Q6H PRN  •  acetaminophen (TYLENOL) tab 650 mg, 650 mg, Oral, Q6H PRN  •  enoxaparin (LOVENOX) 40 MG/0.4ML injection 40 mg, 40 mg, Subcutaneous, Daily  •  lactated 131 126   CA 8.9 8.3*   ALB 2.7* 2.5*    140   K 3.3* 3.2*    106   CO2 24.0 23.0   ALKPHO 144* 129*   AST 22 24   ALT 19 17   BILT 0.5 0.4   TP 6.8 5.8*       Microbiology: Reviewed in EMR    Radiology: Reviewed    ASSESSMENT:    Antibiotics:

## 2021-12-30 NOTE — PROGRESS NOTES
[facility]    CNM Service Progress Note    Rianna Maza Patient Status:  Observation    1995 MRN [id number]   Location [facility] Attending Amaya Gooden MD   Hosp Day # 0 PCP KATARZYNA Alvarez     Subjective:    Inter 12/30/2021    UROBILINOGEN 2.0 (A) 12/30/2021    LEUUR Negative 12/30/2021       Assessment & Plan:     COVID-19 virus infection in prenancy, 3rd trimester  NSTs to be performed qshift. [facility] charge nurse notified  MFM consult placed.  Dr Anton Rojas to see Lynn Casper

## 2021-12-30 NOTE — ED INITIAL ASSESSMENT (HPI)
Pt arrives with lower extremity weakness, Tmax pt reports \"106\", temp in triage 100.3 Pt took tylenol at home, latest dose at 2030. Pt reports headache and dizziness. Sore throat, sob. Pt tested positive for covid at home, unvaccinated.   Pt reports she h

## 2021-12-30 NOTE — ED PROVIDER NOTES
Patient Seen in: [facility] AND [facility] Emergency Department      History   Patient presents with:  Cough/URI  Fever    Stated Complaint: covid symptoms, fever, 33 wks pregnant. FBC will come down.     Subjective:   25yo/f currently 33 weeks pregnant by US rep Cardiovascular:      Rate and Rhythm: Regular rhythm. Tachycardia present. Heart sounds: Normal heart sounds. Pulmonary:      Effort: Pulmonary effort is normal.      Breath sounds: Normal breath sounds.    Abdominal:      General: Bowel sounds are (PT) - Normal   PTT, ACTIVATED - Normal   CBC WITH DIFFERENTIAL WITH PLATELET    Narrative: The following orders were created for panel order CBC With Differential With Platelet.   Procedure                               Abnormality         Status Hospital Problems             Present on Admission  Date Reviewed: 12/16/2021          ICD-10-CM Noted POA    * (Principal) COVID-19 virus infection U07.1 12/30/2021 Unknown

## 2021-12-30 NOTE — PLAN OF CARE
Pt alert and oriented x4 on r/a. Tylenol for minor headache and cough syrup given. Pt adequate for discharge. Tele monitor and IV removed. Discharge instructions to be given.    Problem: Patient Centered Care  Goal: Patient preferences are identified and int baseline  Description: INTERVENTIONS:  - Continuous cardiac monitoring, monitor vital signs, obtain 12 lead EKG if indicated  - Evaluate effectiveness of antiarrhythmic and heart rate control medications as ordered  - Initiate emergency measures for life t

## 2021-12-30 NOTE — H&P
[address] [facility] Patient Status:  Inpatient    1995 MRN [id number]   Location [facility] Attending Clara Boswell MD   Hosp Day # 0 PCP Elli Ye, KATARZYNA     Date:  2021  Brian Negative. Ear/Nose/Mouth/Throat:  Negative. Respiratory:  Negative  Cardiovascular: Negative  Gastrointestinal:  Negative. Genitourinary:  Negative  Endocrine:  Negative. Immunologic:  Negative.   Musculoskeletal: Generalized aches and pains  Integument 12/30/2021    AST 24 12/30/2021    ALT 17 12/30/2021    PTT 29.0 12/30/2021    INR 0.90 12/30/2021       Imaging:  No results found.      Assessment and Plan:    COVID-19  No significant respiratory symptoms, ID has been consulted, started on remdesivir and

## 2021-12-31 NOTE — PROGRESS NOTES
See RN NST note, Jack Ricci at 12:31 for same tracing assessment.     Fetal Surveillance:   Fetal Heart Tones [facility]): 130 with moderate variability, accelerations present, decelerations absent  Uterine contractions (Ucs): 0    Physician Evaluation      N

## 2022-01-05 ENCOUNTER — TELEPHONE (OUTPATIENT)
Dept: INTERNAL MEDICINE CLINIC | Facility: HOSPITAL | Age: 27
End: 2022-01-05

## 2022-01-05 NOTE — TELEPHONE ENCOUNTER
Outside Covid Testing done    Results and RTW guidelines:    COVID RESULT reported:      Test type:    [x] Rapid outside         [] PCR outside    Date of test: 12/29/21      Test location: [place]         [x] Result viewed in Epic with verbal consent rec diarrhea   - Keep communication open with management about RTW and if symptoms worsen     Notes:     RTW PLAN:    []  If COVID positive results, off work minimum of 5 days from positive test or onset of symptoms (day 0)    []      On day 5, if asymptomatic []          Date of symptom onset: 12/29    SYMPTOMS:  [] asymptomatic    • Fever > 100F               Yes [x]          • Cough                          Yes [x]      • Shortness of breath  Yes [x]      • Congestion                 Yes []      • Runny nose health

## 2022-01-10 ENCOUNTER — ROUTINE PRENATAL (OUTPATIENT)
Dept: OBGYN CLINIC | Facility: CLINIC | Age: 27
End: 2022-01-10
Payer: COMMERCIAL

## 2022-01-10 ENCOUNTER — HOSPITAL ENCOUNTER (OUTPATIENT)
Dept: PERINATAL CARE | Facility: HOSPITAL | Age: 27
Discharge: HOME | End: 2022-01-10
Attending: ADVANCED PRACTICE MIDWIFE
Payer: COMMERCIAL

## 2022-01-10 ENCOUNTER — LAB ENCOUNTER (OUTPATIENT)
Dept: LAB | Facility: HOSPITAL | Age: 27
End: 2022-01-10
Attending: ADVANCED PRACTICE MIDWIFE
Payer: COMMERCIAL

## 2022-01-10 VITALS
BODY MASS INDEX: 23 KG/M2 | DIASTOLIC BLOOD PRESSURE: 58 MMHG | WEIGHT: 123 LBS | HEART RATE: 77 BPM | SYSTOLIC BLOOD PRESSURE: 96 MMHG

## 2022-01-10 VITALS
DIASTOLIC BLOOD PRESSURE: 74 MMHG | SYSTOLIC BLOOD PRESSURE: 114 MMHG | HEART RATE: 91 BPM | BODY MASS INDEX: 23 KG/M2 | WEIGHT: 122 LBS

## 2022-01-10 DIAGNOSIS — U07.1 COVID-19 VIRUS INFECTION: ICD-10-CM

## 2022-01-10 DIAGNOSIS — E03.9 HYPOTHYROIDISM, UNSPECIFIED TYPE: ICD-10-CM

## 2022-01-10 DIAGNOSIS — Z34.03 ENCOUNTER FOR SUPERVISION OF NORMAL FIRST PREGNANCY IN THIRD TRIMESTER: Primary | ICD-10-CM

## 2022-01-10 DIAGNOSIS — U07.1 COVID-19 VIRUS INFECTION: Primary | ICD-10-CM

## 2022-01-10 DIAGNOSIS — O98.513 COVID-19 AFFECTING PREGNANCY IN THIRD TRIMESTER: ICD-10-CM

## 2022-01-10 DIAGNOSIS — U07.1 COVID-19 AFFECTING PREGNANCY IN THIRD TRIMESTER: ICD-10-CM

## 2022-01-10 LAB
APPEARANCE: CLEAR
BILIRUBIN: NEGATIVE
GLUCOSE (URINE DIPSTICK): 250 MG/DL
KETONES (URINE DIPSTICK): NEGATIVE MG/DL
LEUKOCYTES: NEGATIVE
MULTISTIX LOT#: ABNORMAL NUMERIC
NITRITE, URINE: NEGATIVE
OCCULT BLOOD: NEGATIVE
PH, URINE: 7.5 (ref 4.5–8)
SPECIFIC GRAVITY: 1.01 (ref 1–1.03)
T4 FREE SERPL-MCNC: 0.8 NG/DL (ref 0.8–1.7)
TSI SER-ACNC: 7.01 MIU/ML (ref 0.36–3.74)
URINE-COLOR: YELLOW
UROBILINOGEN,SEMI-QN: 0.2 MG/DL (ref 0–1.9)

## 2022-01-10 PROCEDURE — 81002 URINALYSIS NONAUTO W/O SCOPE: CPT | Performed by: ADVANCED PRACTICE MIDWIFE

## 2022-01-10 PROCEDURE — 84443 ASSAY THYROID STIM HORMONE: CPT

## 2022-01-10 PROCEDURE — 36415 COLL VENOUS BLD VENIPUNCTURE: CPT

## 2022-01-10 PROCEDURE — 99212 OFFICE O/P EST SF 10 MIN: CPT | Performed by: OBSTETRICS & GYNECOLOGY

## 2022-01-10 PROCEDURE — 76819 FETAL BIOPHYS PROFIL W/O NST: CPT | Performed by: OBSTETRICS & GYNECOLOGY

## 2022-01-10 PROCEDURE — 76816 OB US FOLLOW-UP PER FETUS: CPT | Performed by: ADVANCED PRACTICE MIDWIFE

## 2022-01-10 PROCEDURE — 84439 ASSAY OF FREE THYROXINE: CPT

## 2022-01-10 PROCEDURE — 3078F DIAST BP <80 MM HG: CPT | Performed by: ADVANCED PRACTICE MIDWIFE

## 2022-01-10 PROCEDURE — 3074F SYST BP LT 130 MM HG: CPT | Performed by: ADVANCED PRACTICE MIDWIFE

## 2022-01-10 PROCEDURE — 76816 OB US FOLLOW-UP PER FETUS: CPT | Performed by: OBSTETRICS & GYNECOLOGY

## 2022-01-10 RX ORDER — LEVOTHYROXINE SODIUM 0.03 MG/1
TABLET ORAL
Qty: 90 TABLET | Refills: 1 | OUTPATIENT
Start: 2022-01-10

## 2022-01-10 NOTE — PROGRESS NOTES
Cj Green    Dear  Ms. Hernadez    Thank you for requesting ultrasound evaluation and maternal fetal medicine consultation on your patient Bubba Mcbride.   As you are aware she is a 23year old female  with a singl chromosomal abnormalities. See PACS/Imaging Tab For Complete Ultrasound Report  I interpreted the results and reviewed them with the patient.     DISCUSSION  During her visit we discussed and reviewed the following issues:  COVID 19 in pregnancy: Please s pregnancy      RECOMMENDATIONS:  · Continue care with  Ms. Hernadez  · She can proceed with covid 19 vaccination    Thank you for allowing me to participate in the care of your patient.   Please do not hesitate to contact me if additional questions or concer

## 2022-01-10 NOTE — PROGRESS NOTES
Feeling well. COVID symptoms have resolved. Endorses regular fetal movement. Denies contractions, LOF, vaginal bleeding. TSH ordered. Reviewed warning signs and when to call.  TAHIRA 1 wks

## 2022-01-11 RX ORDER — LEVOTHYROXINE SODIUM 0.03 MG/1
25 TABLET ORAL
Qty: 90 TABLET | Refills: 1 | OUTPATIENT
Start: 2022-01-11

## 2022-01-14 ENCOUNTER — TELEPHONE (OUTPATIENT)
Dept: FAMILY MEDICINE CLINIC | Facility: CLINIC | Age: 27
End: 2022-01-14

## 2022-01-14 RX ORDER — LEVOTHYROXINE SODIUM 0.03 MG/1
25 TABLET ORAL
Qty: 90 TABLET | Refills: 1 | Status: CANCELLED | OUTPATIENT
Start: 2022-01-14

## 2022-01-14 RX ORDER — LEVOTHYROXINE SODIUM 0.03 MG/1
25 TABLET ORAL DAILY
Qty: 90 TABLET | Refills: 1 | Status: SHIPPED | OUTPATIENT
Start: 2022-01-14 | End: 2022-04-06

## 2022-01-14 NOTE — TELEPHONE ENCOUNTER
Patient states she went twice to  her thyroid medication at the pharmacy and pharmacy does not have prescription. Levothyroxine was sent on 12/28/21    Attempted to call pharmacy, but pharmacy is closed, will try again later.         levothyroxine 25

## 2022-01-14 NOTE — TELEPHONE ENCOUNTER
Spoke to pharmacist at [facility] in [place]. Prescription for levothyroxine was received 12/28/21 and will be ready in about 1 1/2 hrs. Spoke to patient and advised her medication will be ready in 1 and 1/2 hrs.

## 2022-01-18 ENCOUNTER — ROUTINE PRENATAL (OUTPATIENT)
Dept: OBGYN CLINIC | Facility: CLINIC | Age: 27
End: 2022-01-18
Payer: COMMERCIAL

## 2022-01-18 VITALS
BODY MASS INDEX: 24 KG/M2 | HEART RATE: 83 BPM | WEIGHT: 125.81 LBS | DIASTOLIC BLOOD PRESSURE: 65 MMHG | SYSTOLIC BLOOD PRESSURE: 100 MMHG

## 2022-01-18 DIAGNOSIS — Z34.03 ENCOUNTER FOR SUPERVISION OF NORMAL FIRST PREGNANCY IN THIRD TRIMESTER: Primary | ICD-10-CM

## 2022-01-18 DIAGNOSIS — E03.9 HYPOTHYROIDISM, UNSPECIFIED TYPE: ICD-10-CM

## 2022-01-18 LAB
APPEARANCE: CLEAR
BILIRUBIN: NEGATIVE
GLUCOSE (URINE DIPSTICK): NEGATIVE MG/DL
KETONES (URINE DIPSTICK): NEGATIVE MG/DL
LEUKOCYTES: NEGATIVE
MULTISTIX LOT#: NORMAL NUMERIC
NITRITE, URINE: NEGATIVE
OCCULT BLOOD: NEGATIVE
PH, URINE: 7 (ref 4.5–8)
PROTEIN (URINE DIPSTICK): NEGATIVE MG/DL
SPECIFIC GRAVITY: 1.02 (ref 1–1.03)
URINE-COLOR: YELLOW
UROBILINOGEN,SEMI-QN: 0.2 MG/DL (ref 0–1.9)

## 2022-01-18 PROCEDURE — 3078F DIAST BP <80 MM HG: CPT | Performed by: ADVANCED PRACTICE MIDWIFE

## 2022-01-18 PROCEDURE — 3074F SYST BP LT 130 MM HG: CPT | Performed by: ADVANCED PRACTICE MIDWIFE

## 2022-01-18 PROCEDURE — 81002 URINALYSIS NONAUTO W/O SCOPE: CPT | Performed by: ADVANCED PRACTICE MIDWIFE

## 2022-01-18 NOTE — PROGRESS NOTES
Baby active, no SOL. GBS today. SOL and warning signs reviewed. TSH elevated, states was unable to get Rx filled and did not take for 10 days. Has now started taking so would like to repeat level @ nv to see if back to normal. If not will contact her Endo.

## 2022-01-20 LAB — GROUP B STREP BY PCR FOR PCR OVT: NEGATIVE

## 2022-01-26 ENCOUNTER — IMMUNIZATION (OUTPATIENT)
Dept: LAB | Facility: HOSPITAL | Age: 27
End: 2022-01-26
Attending: EMERGENCY MEDICINE
Payer: COMMERCIAL

## 2022-01-26 ENCOUNTER — ROUTINE PRENATAL (OUTPATIENT)
Dept: OBGYN CLINIC | Facility: CLINIC | Age: 27
End: 2022-01-26
Payer: COMMERCIAL

## 2022-01-26 VITALS
WEIGHT: 128 LBS | DIASTOLIC BLOOD PRESSURE: 75 MMHG | BODY MASS INDEX: 24 KG/M2 | SYSTOLIC BLOOD PRESSURE: 114 MMHG | HEART RATE: 94 BPM

## 2022-01-26 DIAGNOSIS — Z34.03 ENCOUNTER FOR SUPERVISION OF NORMAL FIRST PREGNANCY IN THIRD TRIMESTER: Primary | ICD-10-CM

## 2022-01-26 DIAGNOSIS — Z23 NEED FOR VACCINATION: Primary | ICD-10-CM

## 2022-01-26 LAB
APPEARANCE: CLEAR
BILIRUBIN: NEGATIVE
GLUCOSE (URINE DIPSTICK): NEGATIVE MG/DL
KETONES (URINE DIPSTICK): NEGATIVE MG/DL
LEUKOCYTES: NEGATIVE
MULTISTIX LOT#: NORMAL NUMERIC
NITRITE, URINE: NEGATIVE
OCCULT BLOOD: NEGATIVE
PH, URINE: 5 (ref 4.5–8)
PROTEIN (URINE DIPSTICK): NEGATIVE MG/DL
SPECIFIC GRAVITY: 1.01 (ref 1–1.03)
UROBILINOGEN,SEMI-QN: 0.2 MG/DL (ref 0–1.9)

## 2022-01-26 PROCEDURE — 0004A SARSCOV2 VAC 30MCG/0.3ML IM: CPT

## 2022-01-26 PROCEDURE — 81002 URINALYSIS NONAUTO W/O SCOPE: CPT | Performed by: ADVANCED PRACTICE MIDWIFE

## 2022-01-26 PROCEDURE — 3078F DIAST BP <80 MM HG: CPT | Performed by: ADVANCED PRACTICE MIDWIFE

## 2022-01-26 PROCEDURE — 3074F SYST BP LT 130 MM HG: CPT | Performed by: ADVANCED PRACTICE MIDWIFE

## 2022-01-26 NOTE — PATIENT INSTRUCTIONS
Understanding Preeclampsia  Preeclampsia is high blood pressure (hypertension) that happens during pregnancy. It often shows up around the 20th week of pregnancy. It often goes back to normal by the 12th week after you give birth.  It can lead to serious Some cases of preeclampsia are more severe than others. Your symptoms also may change or get worse as you get closer to your due date. Who’s at risk? No one knows what causes preeclampsia. Preeclampsia can happen in any pregnant woman.  But it is more com deliver. [employer]Well last reviewed this educational content on 12/1/2019  © 1674-9066 [employer]. [address] [facility], [facility] [place]. All rights reserved. This information is not intended as a substitute for professional medical care.  A professional's instructions. Recognizing Labor    The beginning of labor is the beginning of birth. You’ll start to feel strong contractions. That’s when the muscles of your uterus tighten up to help push your baby out during birth.   Yes, labor has stages. Your healthcare provider may talk about the progress of your labor with certain words. One of these is your baby’s position. Another is your baby’s station. And the effacement and dilation of your cervix will be noted.  Read below to learn about the professional medical care. Always follow your healthcare professional's instructions.

## 2022-01-26 NOTE — PROGRESS NOTES
Diana Mcdonald is here for her [facility] [address] visit. Currently, she is feeling well. Denies 3rd trimester danger signs.      Birth plan reviewed:    Support:   Pain management: epidural, potentially early  Vitamin K: yes  Erythromycin ointment: yes  Placenta:   Circumcis

## 2022-02-01 ENCOUNTER — TELEMEDICINE (OUTPATIENT)
Dept: FAMILY MEDICINE CLINIC | Facility: CLINIC | Age: 27
End: 2022-02-01
Payer: COMMERCIAL

## 2022-02-01 DIAGNOSIS — Z3A.38 38 WEEKS GESTATION OF PREGNANCY: ICD-10-CM

## 2022-02-01 DIAGNOSIS — E03.9 ACQUIRED HYPOTHYROIDISM: Primary | ICD-10-CM

## 2022-02-01 PROCEDURE — 99213 OFFICE O/P EST LOW 20 MIN: CPT | Performed by: NURSE PRACTITIONER

## 2022-02-02 PROBLEM — Z3A.38 38 WEEKS GESTATION OF PREGNANCY: Status: ACTIVE | Noted: 2022-02-02

## 2022-02-02 PROBLEM — Z3A.38 38 WEEKS GESTATION OF PREGNANCY (HCC): Status: ACTIVE | Noted: 2022-02-02

## 2022-02-03 ENCOUNTER — HOSPITAL ENCOUNTER (INPATIENT)
Facility: HOSPITAL | Age: 27
LOS: 2 days | Discharge: HOME | End: 2022-02-05
Attending: ADVANCED PRACTICE MIDWIFE | Admitting: OBSTETRICS & GYNECOLOGY
Payer: COMMERCIAL

## 2022-02-03 ENCOUNTER — ANESTHESIA (OUTPATIENT)
Dept: OBGYN UNIT | Facility: HOSPITAL | Age: 27
End: 2022-02-03
Payer: COMMERCIAL

## 2022-02-03 ENCOUNTER — ANESTHESIA EVENT (OUTPATIENT)
Dept: OBGYN UNIT | Facility: HOSPITAL | Age: 27
End: 2022-02-03
Payer: COMMERCIAL

## 2022-02-03 ENCOUNTER — PATIENT MESSAGE (OUTPATIENT)
Dept: OBGYN CLINIC | Facility: CLINIC | Age: 27
End: 2022-02-03

## 2022-02-03 ENCOUNTER — MOBILE ENCOUNTER (OUTPATIENT)
Dept: OBGYN CLINIC | Facility: CLINIC | Age: 27
End: 2022-02-03

## 2022-02-03 PROBLEM — O98.513 COVID-19 AFFECTING PREGNANCY IN THIRD TRIMESTER: Status: ACTIVE | Noted: 2022-02-03

## 2022-02-03 PROBLEM — Z37.9 NORMAL LABOR: Status: ACTIVE | Noted: 2022-02-03

## 2022-02-03 PROBLEM — Z28.3 RUBELLA NON-IMMUNE STATUS, ANTEPARTUM: Status: ACTIVE | Noted: 2022-02-03

## 2022-02-03 PROBLEM — Z28.39 RUBELLA NON-IMMUNE STATUS, ANTEPARTUM (HCC): Status: ACTIVE | Noted: 2022-02-03

## 2022-02-03 PROBLEM — U07.1 COVID-19 AFFECTING PREGNANCY IN THIRD TRIMESTER (HCC): Status: ACTIVE | Noted: 2022-02-03

## 2022-02-03 PROBLEM — Z34.90 PREGNANCY: Status: ACTIVE | Noted: 2022-02-03

## 2022-02-03 PROBLEM — O99.891 RUBELLA NON-IMMUNE STATUS, ANTEPARTUM: Status: ACTIVE | Noted: 2022-02-03

## 2022-02-03 PROBLEM — U07.1 COVID-19 AFFECTING PREGNANCY IN THIRD TRIMESTER: Status: ACTIVE | Noted: 2022-02-03

## 2022-02-03 PROBLEM — O09.899 RUBELLA NON-IMMUNE STATUS, ANTEPARTUM (HCC): Status: ACTIVE | Noted: 2022-02-03

## 2022-02-03 PROBLEM — Z34.90 PREGNANCY (HCC): Status: ACTIVE | Noted: 2022-02-03

## 2022-02-03 PROBLEM — O98.513 COVID-19 AFFECTING PREGNANCY IN THIRD TRIMESTER (HCC): Status: ACTIVE | Noted: 2022-02-03

## 2022-02-03 PROBLEM — Z37.9 NORMAL LABOR (HCC): Status: ACTIVE | Noted: 2022-02-03

## 2022-02-03 PROBLEM — O09.899 RUBELLA NON-IMMUNE STATUS, ANTEPARTUM: Status: ACTIVE | Noted: 2022-02-03

## 2022-02-03 PROBLEM — Z28.39 RUBELLA NON-IMMUNE STATUS, ANTEPARTUM: Status: ACTIVE | Noted: 2022-02-03

## 2022-02-03 LAB
ANTIBODY SCREEN: NEGATIVE
BASOPHILS NFR BLD AUTO: 0.4 %
DEPRECATED RDW RBC AUTO: 44.6 FL (ref 35.1–46.3)
EOSINOPHIL # BLD AUTO: 0.05 X10(3) UL (ref 0–0.7)
EOSINOPHIL NFR BLD AUTO: 0.4 %
ERYTHROCYTE [DISTWIDTH] IN BLOOD BY AUTOMATED COUNT: 12.8 % (ref 11–15)
HCT VFR BLD AUTO: 38.2 %
HGB BLD-MCNC: 13.2 G/DL
IMM GRANULOCYTES # BLD AUTO: 0.15 X10(3) UL (ref 0–1)
IMM GRANULOCYTES NFR BLD: 1.3 %
LYMPHOCYTES # BLD AUTO: 1.72 X10(3) UL (ref 1–4)
LYMPHOCYTES NFR BLD AUTO: 14.8 %
MCH RBC QN AUTO: 33.1 PG (ref 26–34)
MCHC RBC AUTO-ENTMCNC: 34.6 G/DL (ref 31–37)
MCV RBC AUTO: 95.7 FL
MONOCYTES # BLD AUTO: 0.83 X10(3) UL (ref 0.1–1)
MONOCYTES NFR BLD AUTO: 7.1 %
NEUTROPHILS # BLD AUTO: 8.82 X10 (3) UL (ref 1.5–7.7)
NEUTROPHILS # BLD AUTO: 8.82 X10(3) UL (ref 1.5–7.7)
NEUTROPHILS NFR BLD AUTO: 76 %
PLATELET # BLD AUTO: 212 10(3)UL (ref 150–450)
RBC # BLD AUTO: 3.99 X10(6)UL
RH BLOOD TYPE: POSITIVE
WBC # BLD AUTO: 11.6 X10(3) UL (ref 4–11)

## 2022-02-03 PROCEDURE — 0KQM0ZZ REPAIR PERINEUM MUSCLE, OPEN APPROACH: ICD-10-PCS | Performed by: ADVANCED PRACTICE MIDWIFE

## 2022-02-03 PROCEDURE — 59400 OBSTETRICAL CARE: CPT | Performed by: ADVANCED PRACTICE MIDWIFE

## 2022-02-03 RX ORDER — IBUPROFEN 600 MG/1
600 TABLET ORAL EVERY 6 HOURS PRN
Status: DISCONTINUED | OUTPATIENT
Start: 2022-02-03 | End: 2022-02-03 | Stop reason: HOSPADM

## 2022-02-03 RX ORDER — ONDANSETRON 2 MG/ML
4 INJECTION INTRAMUSCULAR; INTRAVENOUS EVERY 6 HOURS PRN
Status: DISCONTINUED | OUTPATIENT
Start: 2022-02-03 | End: 2022-02-03

## 2022-02-03 RX ORDER — BUPIVACAINE HYDROCHLORIDE 2.5 MG/ML
20 INJECTION, SOLUTION EPIDURAL; INFILTRATION; INTRACAUDAL ONCE
Status: COMPLETED | OUTPATIENT
Start: 2022-02-03 | End: 2022-02-03

## 2022-02-03 RX ORDER — DOCUSATE SODIUM 100 MG/1
100 CAPSULE, LIQUID FILLED ORAL
Status: DISCONTINUED | OUTPATIENT
Start: 2022-02-03 | End: 2022-02-03

## 2022-02-03 RX ORDER — IBUPROFEN 600 MG/1
600 TABLET ORAL EVERY 6 HOURS PRN
Status: DISCONTINUED | OUTPATIENT
Start: 2022-02-03 | End: 2022-02-05

## 2022-02-03 RX ORDER — BUPIVACAINE HCL/0.9 % NACL/PF 0.25 %
5 PLASTIC BAG, INJECTION (ML) EPIDURAL AS NEEDED
Status: DISCONTINUED | OUTPATIENT
Start: 2022-02-03 | End: 2022-02-04

## 2022-02-03 RX ORDER — BISACODYL 10 MG
10 SUPPOSITORY, RECTAL RECTAL ONCE AS NEEDED
Status: DISCONTINUED | OUTPATIENT
Start: 2022-02-03 | End: 2022-02-05

## 2022-02-03 RX ORDER — ONDANSETRON 2 MG/ML
4 INJECTION INTRAMUSCULAR; INTRAVENOUS EVERY 6 HOURS PRN
Status: DISCONTINUED | OUTPATIENT
Start: 2022-02-03 | End: 2022-02-05

## 2022-02-03 RX ORDER — AMMONIA INHALANTS 0.04 G/.3ML
0.3 INHALANT RESPIRATORY (INHALATION) AS NEEDED
Status: DISCONTINUED | OUTPATIENT
Start: 2022-02-03 | End: 2022-02-05

## 2022-02-03 RX ORDER — TRANEXAMIC ACID 10 MG/ML
1000 INJECTION, SOLUTION INTRAVENOUS ONCE AS NEEDED
Status: COMPLETED | OUTPATIENT
Start: 2022-02-03 | End: 2022-02-03

## 2022-02-03 RX ORDER — ACETAMINOPHEN 325 MG/1
650 TABLET ORAL EVERY 6 HOURS PRN
Status: DISCONTINUED | OUTPATIENT
Start: 2022-02-03 | End: 2022-02-05

## 2022-02-03 RX ORDER — ACETAMINOPHEN 500 MG
500 TABLET ORAL EVERY 6 HOURS PRN
Status: DISCONTINUED | OUTPATIENT
Start: 2022-02-03 | End: 2022-02-03 | Stop reason: HOSPADM

## 2022-02-03 RX ORDER — AMMONIA INHALANTS 0.04 G/.3ML
0.3 INHALANT RESPIRATORY (INHALATION) AS NEEDED
Status: DISCONTINUED | OUTPATIENT
Start: 2022-02-03 | End: 2022-02-03

## 2022-02-03 RX ORDER — DOCUSATE SODIUM 100 MG/1
100 CAPSULE, LIQUID FILLED ORAL 2 TIMES DAILY
Status: DISCONTINUED | OUTPATIENT
Start: 2022-02-04 | End: 2022-02-05

## 2022-02-03 RX ORDER — IBUPROFEN 600 MG/1
600 TABLET ORAL EVERY 6 HOURS
Status: DISCONTINUED | OUTPATIENT
Start: 2022-02-03 | End: 2022-02-03

## 2022-02-03 RX ORDER — TRISODIUM CITRATE DIHYDRATE AND CITRIC ACID MONOHYDRATE 500; 334 MG/5ML; MG/5ML
30 SOLUTION ORAL AS NEEDED
Status: DISCONTINUED | OUTPATIENT
Start: 2022-02-03 | End: 2022-02-03 | Stop reason: HOSPADM

## 2022-02-03 RX ORDER — METHYLERGONOVINE MALEATE 0.2 MG/ML
INJECTION INTRAVENOUS
Status: COMPLETED
Start: 2022-02-03 | End: 2022-02-03

## 2022-02-03 RX ORDER — SODIUM CHLORIDE, SODIUM LACTATE, POTASSIUM CHLORIDE, CALCIUM CHLORIDE 600; 310; 30; 20 MG/100ML; MG/100ML; MG/100ML; MG/100ML
INJECTION, SOLUTION INTRAVENOUS CONTINUOUS
Status: DISCONTINUED | OUTPATIENT
Start: 2022-02-03 | End: 2022-02-03 | Stop reason: HOSPADM

## 2022-02-03 RX ORDER — SIMETHICONE 80 MG
80 TABLET,CHEWABLE ORAL 3 TIMES DAILY PRN
Status: DISCONTINUED | OUTPATIENT
Start: 2022-02-03 | End: 2022-02-05

## 2022-02-03 RX ORDER — LIDOCAINE HYDROCHLORIDE 10 MG/ML
30 INJECTION, SOLUTION EPIDURAL; INFILTRATION; INTRACAUDAL; PERINEURAL ONCE
Status: DISCONTINUED | OUTPATIENT
Start: 2022-02-03 | End: 2022-02-03 | Stop reason: HOSPADM

## 2022-02-03 RX ORDER — TRANEXAMIC ACID 10 MG/ML
INJECTION, SOLUTION INTRAVENOUS
Status: DISCONTINUED
Start: 2022-02-03 | End: 2022-02-03 | Stop reason: WASHOUT

## 2022-02-03 RX ORDER — CARBOPROST TROMETHAMINE 250 UG/ML
INJECTION, SOLUTION INTRAMUSCULAR
Status: DISCONTINUED
Start: 2022-02-03 | End: 2022-02-03 | Stop reason: WASHOUT

## 2022-02-03 RX ORDER — NALBUPHINE HCL 10 MG/ML
2.5 AMPUL (ML) INJECTION
Status: DISCONTINUED | OUTPATIENT
Start: 2022-02-03 | End: 2022-02-04

## 2022-02-03 RX ORDER — DIAPER,BRIEF,INFANT-TODD,DISP
1 EACH MISCELLANEOUS EVERY 6 HOURS PRN
Status: DISCONTINUED | OUTPATIENT
Start: 2022-02-03 | End: 2022-02-05

## 2022-02-03 RX ORDER — TERBUTALINE SULFATE 1 MG/ML
0.25 INJECTION, SOLUTION SUBCUTANEOUS AS NEEDED
Status: DISCONTINUED | OUTPATIENT
Start: 2022-02-03 | End: 2022-02-03 | Stop reason: HOSPADM

## 2022-02-03 RX ORDER — LIDOCAINE HYDROCHLORIDE 10 MG/ML
INJECTION, SOLUTION EPIDURAL; INFILTRATION; INTRACAUDAL; PERINEURAL AS NEEDED
Status: DISCONTINUED | OUTPATIENT
Start: 2022-02-03 | End: 2022-02-03 | Stop reason: SURG

## 2022-02-03 RX ORDER — LIDOCAINE HYDROCHLORIDE AND EPINEPHRINE 15; 5 MG/ML; UG/ML
INJECTION, SOLUTION EPIDURAL AS NEEDED
Status: DISCONTINUED | OUTPATIENT
Start: 2022-02-03 | End: 2022-02-03 | Stop reason: SURG

## 2022-02-03 RX ORDER — DEXTROSE, SODIUM CHLORIDE, SODIUM LACTATE, POTASSIUM CHLORIDE, AND CALCIUM CHLORIDE 5; .6; .31; .03; .02 G/100ML; G/100ML; G/100ML; G/100ML; G/100ML
INJECTION, SOLUTION INTRAVENOUS CONTINUOUS
Status: DISCONTINUED | OUTPATIENT
Start: 2022-02-03 | End: 2022-02-03 | Stop reason: HOSPADM

## 2022-02-03 RX ORDER — MISOPROSTOL 200 UG/1
TABLET ORAL
Status: COMPLETED
Start: 2022-02-03 | End: 2022-02-03

## 2022-02-03 RX ADMIN — BUPIVACAINE HYDROCHLORIDE 10 ML: 2.5 INJECTION, SOLUTION EPIDURAL; INFILTRATION; INTRACAUDAL at 07:40:00

## 2022-02-03 RX ADMIN — LIDOCAINE HYDROCHLORIDE AND EPINEPHRINE 5 ML: 15; 5 INJECTION, SOLUTION EPIDURAL at 07:39:00

## 2022-02-03 RX ADMIN — LIDOCAINE HYDROCHLORIDE 3 ML: 10 INJECTION, SOLUTION EPIDURAL; INFILTRATION; INTRACAUDAL; PERINEURAL at 07:35:00

## 2022-02-03 NOTE — TELEPHONE ENCOUNTER
From: Semaj Harden  To:  Lyn Mauro CNM  Sent: 2/3/2022 3:07 AM CST  Subject: Clots vs mucus     Please see the attached picture

## 2022-02-03 NOTE — ANESTHESIA POSTPROCEDURE EVALUATION
Patient: Ismael Blunt    Procedure Summary     Date: 02/03/22 Room / Location:     Anesthesia Start: 7860 Anesthesia Stop: 2688    Procedure: LABOR ANALGESIA Diagnosis:     Scheduled Providers:  Anesthesiologist: Daylin Malone MD    Anesthesia Type: epidural ASA Status: 2 - Emergent          Anesthesia Type: epidural    PACU Vitals    BP      Temp      Pulse     Resp      SpO2          [facility] AN Post Evaluation:   Patient Evaluated in PACU  Patient Participation: complete - patient participated  Level of Consciousness: awake  Pain Score: 0  Pain Management: adequate  Airway Patency:patent  Dental exam unchanged from preop  Yes    Cardiovascular Status: acceptable  Respiratory Status: acceptable  Postoperative Hydration acceptable      Angélica Lopez MD  2/3/2022 3:16 PM

## 2022-02-03 NOTE — PROGRESS NOTES
Pt is a 23year old female admitted to TR2/TR2-A. No chief complaint on file. Pt is  38w3d intra-uterine pregnancy. History obtained, consents signed. Oriented to room, staff, and plan of care.

## 2022-02-03 NOTE — L&D DELIVERY NOTE
Qian Vega, Girl [[id number]]    Labor Events    Rupture date/time: 2/3/2022 1220     Rupture type: SROM  Fluid color: Clear     Labor Event Times    Labor onset date/time: 2/3/2022 0745  Dilation complete date/time: 2/3/2022 1354      Presentation    Presentation: Vertex  Position: Right Occiput Anterior     Operative Delivery    Operative Vaginal Delivery: No            Shoulder Dystocia    Shoulder Dystocia: No     Anesthesia    Method: Epidural          [place] Delivery    Head delivery date/time: 2/3/2022 14:34:41   Delivery date/time:  2/3/22 14:34:57     Details:        Delivery Providers    Delivering Clinician: Regino Garcia CNM   Delivery personnel:  Provider Role    Baby Nurse    Delivery Nurse         Cord    Vessels: 3 Vessels  Complications: Nuchal  # of loops: 1  Cord blood disposition: cord blood bank  Gases sent?: No      Measurements    Weight: 3460 g 7 lb 10.1 oz Length: 50.5 cm   Head circum. : 34 cm          Placenta    Date/time: 2/3/2022 1438  Removal: Spontaneous  Appearance: Intact  Disposition: Pathology     Apgars    Living status: Living   Apgar Scoring Key:    0 1 2    Skin color Blue or pale Acrocyanotic Completely pink    Heart rate Absent <100 bpm >100 bpm    Reflex irritability No response Grimace Cry or active withdrawal    Muscle tone Limp Some flexion Active motion    Respiratory effort Absent Weak cry; hypoventilation Good, crying              1 Minute:  5 Minute:  10 Minute:  15 Minute:  20 Minute:    Skin color: 1  1       Heart rate: 2  2       Reflex irritablity: 2  2       Muscle tone: 2  2       Respiratory effort: 2  2       Total: 9  9          Apgars assigned by: Ty Pérez RN   disposition: with mother     Skin to Skin    No data filed     Vaginal Count    No data filed     Delivery (Maternal)    No data filed            [facility]    Vaginal Delivery Note    Anika Howe Patient Status:  Inpatient    1995 MRN [id number] Location [address] [place] [address] Attending Timothy Botello, [facility] Day # 0 PCP KATARZYNA Jimenez     Delivery     Infant  Date of Delivery: 2/3/2022   Time of Delivery: 2:34 PM  Delivery Type:      Infant Sex/Birthweight: female 7 lb 10.1 oz (3.46 kg)     Presentation Vertex [1]  Position Right [3] Occiput [1] Anterior [1]    Apgars:  1 minute: 9               5 minutes: 9                        10 minutes:      Placenta  Date/Time of Delivery: 2/3/2022  2:38 PM   Delivery: spontaneous  Placenta to Pathology: yes  Cord Gases Submitted: no  Cord Blood Collection: yes  Cord Tissue Collection: yes  Cord Complications: single nuchal and loose and reduced  Sponge and Needle Counts:  Verified yes    Maternal Anesthesia: epidural   Episiotomy/Laceration Repair  Laceration: perineal 2nd degree    Delivery Complications  none    Neonatologist Present: no  Delivery Comment:   Morro Gómez progressed to complete dilatation and +3 station prior to pushing successfully to viable baby girl. See Delivery Summary above for time, APGARs, and weight. Fetal head presented in the TOVA position. Sweep for nuchal cord was performed and loose nuchal cord identified and reduced. Anterior shoulder delivered spontaneously without traction. Baby vigorous at birth. To warmer for dry and stimulation per patient request after cord cut. Pulsing had ceased. Prophylactic IV pitocin initiated in 3rd stage. Spontaneous placenta by Amber Hermanns mechanism, complete with 3 vessel cord. Brisk bleeding after placenta but hemostasis achieved by fundal massage, IV Pitocin at 600mUnits, Methergine IM, and Cytotec KS. Vagina and perineum inspected and 2nd degree perineal laceration noted and repaired with 2.0 Chromic under epidural anesthesia. Mother and infant appeared in stable condition when midwife left the delivery room. Sharps and 4x4 counts were correct. Breastfeeding initiated.     Quantitative Blood Loss (mL)   535      Para RAYNA Hackett 2/3/2022  3:07 PM

## 2022-02-03 NOTE — ANESTHESIA PROCEDURE NOTES
Labor Analgesia  Performed by: Irina Jules MD  Authorized by: Irina Jules MD       General Information and Staff    Start Time:  2/3/2022 7:35 AM  End Time:  2/3/2022 7:42 AM  Anesthesiologist:  Irina Jules MD  Performed by:   Anesthesiologist  Site Identification: surface landmarks    Reason for Block: labor epidural    Preanesthetic Checklist: patient identified, IV checked, risks and benefits discussed, monitors and equipment checked, pre-op evaluation, timeout performed, IV bolus, anesthesia consent and sterile technique used      Procedure Details    Patient Position:  Sitting  Prep: ChloraPrep and patient draped    Monitoring:  Heart rate, cardiac monitor and continuous pulse ox  Approach:  Midline    Epidural Needle    Injection Technique:  SUBHASH air  Needle Type:  Tuohy  Needle Gauge:  17 G  Needle Length:  3.5 in  Needle Insertion Depth:  5  Location:  L3-4    Spinal Needle      Catheter    Catheter Type:  Multi-orifice  Catheter Size:  20 G  Catheter at Skin Depth:  9  Test Dose:  Negative    Assessment  Sensory Level:  T10    Additional Comments

## 2022-02-03 NOTE — PROGRESS NOTES
Pc from pt reports contractions every 20-30 minutes lasting 2 minutes. Went to bathroom and had small amount of blood with a small blood clot. Uncertain if blood clot or mucous. +FM. Advised since uncertain if clot should come to hospital to evaluate. Is hesitant as is 40 minutes away and wondering how long it will be until actual active labor with contractions 4 minutes apart. Asking if can send a picture. Pic sent through my chart. Ap pears to be blood tinged mucous. Asvised of such and that it is normal to have bloody show in labor. She can continue to monitor at home. Mucous tinged with blood is normal but should call back with blood like a period or clots, contractions q 4 minutes, lof or decreased fm. States felt baby earlier but uncertain if felt move recently. Advised to drink ice water and have snack and do fm counts. If does not have at least 6 movements in a n hour to call back as would want her to come to hospital for evaluation.  Agrees with plan

## 2022-02-04 LAB
BASOPHILS # BLD AUTO: 0.04 X10(3) UL (ref 0–0.2)
BASOPHILS NFR BLD AUTO: 0.3 %
DEPRECATED RDW RBC AUTO: 45.2 FL (ref 35.1–46.3)
EOSINOPHIL # BLD AUTO: 0.07 X10(3) UL (ref 0–0.7)
EOSINOPHIL NFR BLD AUTO: 0.5 %
ERYTHROCYTE [DISTWIDTH] IN BLOOD BY AUTOMATED COUNT: 13 % (ref 11–15)
HCT VFR BLD AUTO: 35.9 %
HGB BLD-MCNC: 11.9 G/DL
IMM GRANULOCYTES # BLD AUTO: 0.16 X10(3) UL (ref 0–1)
IMM GRANULOCYTES NFR BLD: 1.1 %
LYMPHOCYTES # BLD AUTO: 2.22 X10(3) UL (ref 1–4)
MCH RBC QN AUTO: 31.8 PG (ref 26–34)
MCHC RBC AUTO-ENTMCNC: 33.1 G/DL (ref 31–37)
MCV RBC AUTO: 96 FL
MONOCYTES # BLD AUTO: 1.05 X10(3) UL (ref 0.1–1)
MONOCYTES NFR BLD AUTO: 7.1 %
NEUTROPHILS # BLD AUTO: 11.27 X10 (3) UL (ref 1.5–7.7)
NEUTROPHILS # BLD AUTO: 11.27 X10(3) UL (ref 1.5–7.7)
PLATELET # BLD AUTO: 190 10(3)UL (ref 150–450)
RBC # BLD AUTO: 3.74 X10(6)UL
WBC # BLD AUTO: 14.8 X10(3) UL (ref 4–11)

## 2022-02-04 RX ORDER — CHOLECALCIFEROL (VITAMIN D3) 25 MCG
1 TABLET,CHEWABLE ORAL DAILY
Status: DISCONTINUED | OUTPATIENT
Start: 2022-02-04 | End: 2022-02-05

## 2022-02-04 NOTE — PLAN OF CARE

## 2022-02-04 NOTE — LACTATION NOTE
LACTATION NOTE - MOTHER      Evaluation Type: Inpatient    Problems identified  Problems identified: Knowledge deficit    Maternal history  Maternal history: Hypothyroid    Breastfeeding goal  Breastfeeding goal: To maintain breast milk feeding per patient goal    Maternal Assessment  Bilateral Breasts: Soft;Symmetrical  Bilateral Nipples: Long  Prior breastfeeding experience (comment below): Primip  Breastfeeding Assistance: Breastfeeding assistance provided with permission    Pain assessment  Treatment of Sore Nipples: Deeper latch techniques    Guidelines for use of:  Current use of pump[de-identified] does not remember brand  Suggested use of pump: Pump each time a supplement is offered  Other (comment): reviewed nb education and feeding cues. Assisted with deep latch in cross cradle position. Mom has large nipples but infant was able to latch deeply. Supplementing with ABM because concerned about milk supply, recommended pumping and education done on supply and demand.

## 2022-02-04 NOTE — LACTATION NOTE
This note was copied from a baby's chart. LACTATION NOTE - INFANT    Evaluation Type  Evaluation Type: Inpatient    Problems & Assessment  Problems Diagnosed or Identified: Shallow latch  Infant Assessment: Hunger cues present;Skin color: pink or appropriate for ethnicity  Muscle tone: Appropriate for GA    Feeding Assessment  Summary Current Feeding: Adlib;Breastfeeding with formula supplement  Breastfeeding Assessment: Calm and ready to breastfeed;Assisted with breastfeeding w/mother's permission;Deep latch achieved and observed; Coordinated suck/swallow  Breastfeeding Positions: cross cradle;right breast  Latch: Grasps breast, tongue down, lips flanged, rhythmic sucking  Audible Sucks/Swallows: Spontaneous and intermittent (24 hours old)  Type of Nipple: Everted (after stimulation)  Comfort (Breast/Nipple): Soft/non-tender  Hold (Positioning): Full assist, teach one side, mother does other, staff holds  [facility]: 9  Other (comment): Assisted with deep latch on right breast.  Enc pump to protect supply if giving formula

## 2022-02-04 NOTE — PROGRESS NOTES
Patient up to bathroom with assist x 2. Voided. Patient transferred to mother/baby room 358 per wheelchair in stable condition with baby and personal belongings. Accompanied by significant other and staff. Report given to Saige Trejo, mother/baby RN.

## 2022-02-04 NOTE — PLAN OF CARE
Problem: Patient Centered Care  Goal: Patient preferences are identified and integrated in the patient's plan of care  Description: Interventions:  - What would you like us to know as we care for you?   - Provide timely, complete, and accurate information to patient/family  - Incorporate patient and family knowledge, values, beliefs, and cultural backgrounds into the planning and delivery of care  - Encourage patient/family to participate in care and decision-making at the level they choose  - Honor patient and family perspectives and choices  Outcome: Progressing     Problem: Patient/Family Goals  Goal: Patient/Family Long Term Goal  Description: Patient's Long Term Goal:     Interventions:  -   - See additional Care Plan goals for specific interventions  Outcome: Progressing  Goal: Patient/Family Short Term Goal  Description: Patient's Short Term Goal:     Interventions:   -   - See additional Care Plan goals for specific interventions  Outcome: Progressing     Problem: POSTPARTUM  Goal: Long Term Goal:Experiences normal postpartum course  Description: INTERVENTIONS:  - Assess and monitor vital signs and lab values. - Assess fundus and lochia. - Provide ice/sitz baths for perineum discomfort. - Monitor healing of incision/episiotomy/laceration, and assess for signs and symptoms of infection and hematoma. - Assess bladder function and monitor for bladder distention.  - Provide/instruct/assist with pericare as needed. - Provide VTE prophylaxis as needed. - Monitor bowel function.  - Encourage ambulation and provide assistance as needed. - Assess and monitor emotional status and provide social service/psych resources as needed. - Utilize standard precautions and use personal protective equipment as indicated. Ensure aseptic care of all intravenous lines and invasive tubes/drains.  - Obtain immunization and exposure to communicable diseases history.   Outcome: Progressing  Goal: Optimize infant feeding at the breast  Description: INTERVENTIONS:  - Initiate breast feeding within first hour after birth. - Monitor effectiveness of current breast feeding efforts. - Assess support systems available to mother/family.  - Identify cultural beliefs/practices regarding lactation, letdown techniques, maternal food preferences. - Assess mother's knowledge and previous experience with breast feeding.  - Provide information as needed about early infant feeding cues (e.g., rooting, lip smacking, sucking fingers/hand) versus late cue of crying.  - Discuss/demonstrate breast feeding aids (e.g., infant sling, nursing footstool/pillows, and breast pumps). - Encourage mother/other family members to express feelings/concerns, and actively listen. - Educate father/SO about benefits of breast feeding and how to manage common lactation challenges. - Recommend avoidance of specific medications or substances incompatible with breast feeding.  - Assess and monitor for signs of nipple pain/trauma. - Instruct and provide assistance with proper latch. - Review techniques for milk expression (breast pumping) and storage of breast milk. Provide pumping equipment/supplies, instructions and assistance, as needed. - Encourage rooming-in and breast feeding on demand.  - Encourage skin-to-skin contact. - Provide LC support as needed. - Assess for and manage engorgement. - Provide breast feeding education handouts and information on community breast feeding support. Outcome: Progressing  Goal: Establishment of adequate milk supply with medication/procedure interruptions  Description: INTERVENTIONS:  - Review techniques for milk expression (breast pumping). - Provide pumping equipment/supplies, instructions, and assistance until it is safe to breastfeed infant. Outcome: Progressing  Goal: Experiences normal breast weaning course  Description: INTERVENTIONS:  - Assess for and manage engorgement. - Instruct on breast care.   - Provide comfort measures. Outcome: Progressing  Goal: Appropriate maternal -  bonding  Description: INTERVENTIONS:  - Assess caregiver- interactions. - Assess caregiver's emotional status and coping mechanisms. - Encourage caregiver to participate in  daily care. - Assess support systems available to mother/family.  - Provide /case management support as needed.   Outcome: Progressing

## 2022-02-05 VITALS
OXYGEN SATURATION: 100 % | DIASTOLIC BLOOD PRESSURE: 67 MMHG | TEMPERATURE: 98 F | SYSTOLIC BLOOD PRESSURE: 103 MMHG | RESPIRATION RATE: 16 BRPM | HEART RATE: 65 BPM

## 2022-02-05 PROCEDURE — 3E0134Z INTRODUCTION OF SERUM, TOXOID AND VACCINE INTO SUBCUTANEOUS TISSUE, PERCUTANEOUS APPROACH: ICD-10-PCS | Performed by: ADVANCED PRACTICE MIDWIFE

## 2022-02-05 RX ORDER — IBUPROFEN 600 MG/1
600 TABLET ORAL EVERY 6 HOURS PRN
Qty: 30 TABLET | Refills: 0 | Status: SHIPPED | OUTPATIENT
Start: 2022-02-05

## 2022-02-05 NOTE — PLAN OF CARE
Problem: Patient Centered Care  Goal: Patient preferences are identified and integrated in the patient's plan of care  Description: Interventions:  - What would you like us to know as we care for you?   - Provide timely, complete, and accurate information to patient/family  - Incorporate patient and family knowledge, values, beliefs, and cultural backgrounds into the planning and delivery of care  - Encourage patient/family to participate in care and decision-making at the level they choose  - Honor patient and family perspectives and choices  Outcome: Completed     Problem: Patient/Family Goals  Goal: Patient/Family Long Term Goal  Description: Patient's Long Term Goal:     Interventions:  -   - See additional Care Plan goals for specific interventions  Outcome: Completed  Goal: Patient/Family Short Term Goal  Description: Patient's Short Term Goal:     Interventions:   -   - See additional Care Plan goals for specific interventions  Outcome: Completed     Problem: POSTPARTUM  Goal: Long Term Goal:Experiences normal postpartum course  Description: INTERVENTIONS:  - Assess and monitor vital signs and lab values. - Assess fundus and lochia. - Provide ice/sitz baths for perineum discomfort. - Monitor healing of incision/episiotomy/laceration, and assess for signs and symptoms of infection and hematoma. - Assess bladder function and monitor for bladder distention.  - Provide/instruct/assist with pericare as needed. - Provide VTE prophylaxis as needed. - Monitor bowel function.  - Encourage ambulation and provide assistance as needed. - Assess and monitor emotional status and provide social service/psych resources as needed. - Utilize standard precautions and use personal protective equipment as indicated. Ensure aseptic care of all intravenous lines and invasive tubes/drains.  - Obtain immunization and exposure to communicable diseases history.   2/5/2022 0850 by Afsaneh Sofia RN  Outcome: Completed  2/5/2022 0848 by Kulwinder Resendez RN  Outcome: Progressing  Goal: Optimize infant feeding at the breast  Description: INTERVENTIONS:  - Initiate breast feeding within first hour after birth. - Monitor effectiveness of current breast feeding efforts. - Assess support systems available to mother/family.  - Identify cultural beliefs/practices regarding lactation, letdown techniques, maternal food preferences. - Assess mother's knowledge and previous experience with breast feeding.  - Provide information as needed about early infant feeding cues (e.g., rooting, lip smacking, sucking fingers/hand) versus late cue of crying.  - Discuss/demonstrate breast feeding aids (e.g., infant sling, nursing footstool/pillows, and breast pumps). - Encourage mother/other family members to express feelings/concerns, and actively listen. - Educate father/SO about benefits of breast feeding and how to manage common lactation challenges. - Recommend avoidance of specific medications or substances incompatible with breast feeding.  - Assess and monitor for signs of nipple pain/trauma. - Instruct and provide assistance with proper latch. - Review techniques for milk expression (breast pumping) and storage of breast milk. Provide pumping equipment/supplies, instructions and assistance, as needed. - Encourage rooming-in and breast feeding on demand.  - Encourage skin-to-skin contact. - Provide LC support as needed. - Assess for and manage engorgement. - Provide breast feeding education handouts and information on community breast feeding support. 2/5/2022 0850 by Kulwinder Resendez RN  Outcome: Completed  2/5/2022 0848 by Kulwinder Resendez RN  Outcome: Progressing  Goal: Establishment of adequate milk supply with medication/procedure interruptions  Description: INTERVENTIONS:  - Review techniques for milk expression (breast pumping).    - Provide pumping equipment/supplies, instructions, and assistance until it is safe to breastfeed infant. 2022 0850 by Tito De La Rosa RN  Outcome: Completed  2022 by Tito De La Rosa RN  Outcome: Progressing  Goal: Experiences normal breast weaning course  Description: INTERVENTIONS:  - Assess for and manage engorgement. - Instruct on breast care. - Provide comfort measures. Outcome: Completed  Goal: Appropriate maternal -  bonding  Description: INTERVENTIONS:  - Assess caregiver- interactions. - Assess caregiver's emotional status and coping mechanisms. - Encourage caregiver to participate in  daily care. - Assess support systems available to mother/family.  - Provide /case management support as needed.   2022 0850 by Tito De La Rosa RN  Outcome: Completed  2022 by Tito De La Rosa RN  Outcome: Progressing

## 2022-02-05 NOTE — LACTATION NOTE
LACTATION NOTE - MOTHER      Evaluation Type: Inpatient    Problems identified  Problems identified: Knowledge deficit    Maternal history  Maternal history: Anemia; Anxiety;Depression;Diabetes Mellitus; Hypothyroid  Other/comment: vitamin D Dificiency,    Breastfeeding goal  Breastfeeding goal: To maintain breast milk feeding per patient goal    Maternal Assessment  Bilateral Breasts: Symmetrical;Soft  Bilateral Nipples: Everted;WNL  Prior breastfeeding experience (comment below): Primip  Breastfeeding Assistance: Breastfeeding assistance provided with permission    Pain assessment  Location/Comment: nipples  Pain scale comment: denies  Treatment of Sore Nipples: Deeper latch techniques; Expressed breast milk; Lanolin (encouraged)    Guidelines for use of:  Current use of pump[de-identified] none in hospital but has a pump for home  Suggested use of pump: Pump if infant is not latching to breast;Pump 8-12X/24hr;Pump each time a supplement is offered  Other (comment): reviewed nb education and feeding cues. Assisted with deep latch in cross cradle position on right breast.  Mom has large nipples but infant was able to latch deeply, mom denies pain & confirmed pulling sensation. Supplementing with ABM because concerned about milk supply, recommended pumping and education done on supply and demand in addition to being able to establish & maintain milk supply. Enc to call prn.

## 2022-02-05 NOTE — LACTATION NOTE
This note was copied from a baby's chart. LACTATION NOTE - INFANT         Problems & Assessment  Problems Diagnosed or Identified: Latch difficulty  Infant Assessment: Hunger cues present;Skin color: pink or appropriate for ethnicity  Muscle tone: Appropriate for GA    Feeding Assessment  Summary Current Feeding: Adlib;Breastfeeding with formula supplement  Breastfeeding Assessment: Assisted with breastfeeding w/mother's permission;Calm and ready to breastfeed;Deep latch achieved and observed; Coordinated suck/swallow;Sustained nutrititive latch w/audible swallows  Breastfeeding Positions: cross cradle  Latch: Grasps breast, tongue down, lips flanged, rhythmic sucking  Audible Sucks/Swallows: Spontaneous and intermittent (24 hours old)  Type of Nipple: Everted (after stimulation)  Comfort (Breast/Nipple): Soft/non-tender  Hold (Positioning): Full assist, teach one side, mother does other, staff holds  [facility] Score: 9  Other (comment): Reinforced basic breastfeeding education, normal  behaviors & gentle wake techniques, adequate signs or lactation, shallow vs deep latch techniques, reinforced pumping after giving any supplementation to help in establishing / increasing & maintain milk supply. Support & encouragement given enc mom to call with next feeding and prn. Board in room updated with call #.

## 2022-02-05 NOTE — DISCHARGE SUMMARY
[facility]    Discharge Summary    Shira Hess Patient Status:  Inpatient    1995 MRN [id number]   Location [facility] 3SE Attending Kerrie Runner, [address] Day # 2       Delivering OB Clinician: Danii [facility]A [facility]: Estimated Date of Delivery: 22    Gestational Age: 36w4d    Antepartum complications: Patient Active Problem List:     Immunity status testing     Acquired hypothyroidism     Vitamin D deficiency     COVID-19 virus infection     Scoliosis     COVID-19 affecting pregnancy in third trimester     Rubella non-immune status, antepartum      (spontaneous vaginal delivery)      Date of Delivery: 2/3/2022 Time of Delivery: 2:34 PM    Delivery Type: spontaneous vaginal delivery    Baby: Liveborn female Information for the patient's : Andrae Baron, Girl [[id number]]   7 lb 10.1 oz (3.46 kg)  Apgars:  1 minute: 9  5 minutes: 910 minutes:       Intrapartum Complications: None    Admit Date: 2/3/2022    Discharge Date: 2022    Hospital Course: No complications. Routine delivery and postpartum care    Discharged Condition: stable PP Day 2.      Disposition: home    Plan:     Follow-up appointment in 2 weeks with Danii Kyle CNM  2022  12:45 PM

## 2022-02-05 NOTE — PLAN OF CARE
Problem: Patient Centered Care  Goal: Patient preferences are identified and integrated in the patient's plan of care  Description: Interventions:  - What would you like us to know as we care for you?   - Provide timely, complete, and accurate information to patient/family  - Incorporate patient and family knowledge, values, beliefs, and cultural backgrounds into the planning and delivery of care  - Encourage patient/family to participate in care and decision-making at the level they choose  - Honor patient and family perspectives and choices  Outcome: Progressing   Pain controlled with motrin  Voiding freely. Fundus firm and midline. Bleeding WDL.  VSS. Activity up ad aryan  Diet tolerated. Encouraged ambulation. Denies SOB, chest pain, painful urination, and/or calf pain. Interacting appropriately with infant. Paperwork done  Family at bedside throughout shift. Plan for D/C home 2/5. Mother educated on plan of care, pain medication schedule, infant testing needed to be done on shift. Sat and conversed with patient, encouraged questions, and attentive listening done by RN. Will continue to monitor.

## 2022-02-18 ENCOUNTER — TELEMEDICINE (OUTPATIENT)
Dept: OBGYN CLINIC | Facility: CLINIC | Age: 27
End: 2022-02-18

## 2022-02-18 DIAGNOSIS — Z13.31 DEPRESSION SCREENING: ICD-10-CM

## 2022-02-18 DIAGNOSIS — Z30.8 ENCOUNTER FOR OTHER CONTRACEPTIVE MANAGEMENT: Primary | ICD-10-CM

## 2022-02-24 ENCOUNTER — TELEPHONE (OUTPATIENT)
Dept: OBGYN UNIT | Facility: HOSPITAL | Age: 27
End: 2022-02-24

## 2022-03-18 ENCOUNTER — POSTPARTUM (OUTPATIENT)
Dept: OBGYN CLINIC | Facility: CLINIC | Age: 27
End: 2022-03-18
Payer: COMMERCIAL

## 2022-03-18 VITALS
BODY MASS INDEX: 20.58 KG/M2 | HEART RATE: 66 BPM | WEIGHT: 109 LBS | SYSTOLIC BLOOD PRESSURE: 95 MMHG | HEIGHT: 61 IN | DIASTOLIC BLOOD PRESSURE: 62 MMHG

## 2022-03-18 DIAGNOSIS — N89.8 VAGINAL CYST: ICD-10-CM

## 2022-03-18 DIAGNOSIS — N81.89 VAGINAL RELAXATION: ICD-10-CM

## 2022-03-18 LAB
CONTROL LINE PRESENT WITH A CLEAR BACKGROUND (YES/NO): YES YES/NO
PREGNANCY TEST, URINE: NEGATIVE

## 2022-03-18 PROCEDURE — 3008F BODY MASS INDEX DOCD: CPT | Performed by: ADVANCED PRACTICE MIDWIFE

## 2022-03-18 PROCEDURE — 3074F SYST BP LT 130 MM HG: CPT | Performed by: ADVANCED PRACTICE MIDWIFE

## 2022-03-18 PROCEDURE — 3078F DIAST BP <80 MM HG: CPT | Performed by: ADVANCED PRACTICE MIDWIFE

## 2022-03-18 PROCEDURE — 81025 URINE PREGNANCY TEST: CPT | Performed by: ADVANCED PRACTICE MIDWIFE

## 2022-03-18 RX ORDER — LEVONORGESTREL AND ETHINYL ESTRADIOL 0.1-0.02MG
1 KIT ORAL DAILY
Qty: 28 TABLET | Refills: 11 | Status: SHIPPED | OUTPATIENT
Start: 2022-03-18 | End: 2023-03-18

## 2022-04-07 RX ORDER — LEVOTHYROXINE SODIUM 0.03 MG/1
25 TABLET ORAL DAILY
Qty: 30 TABLET | Refills: 0 | Status: SHIPPED | OUTPATIENT
Start: 2022-04-07 | End: 2022-05-09

## 2022-04-07 RX ORDER — LEVONORGESTREL AND ETHINYL ESTRADIOL 0.1-0.02MG
1 KIT ORAL DAILY
Qty: 28 TABLET | Refills: 11 | Status: CANCELLED | OUTPATIENT
Start: 2022-04-07 | End: 2023-04-07

## 2022-04-07 NOTE — TELEPHONE ENCOUNTER
Please review; protocol failed/no protocol. Requested Prescriptions   Pending Prescriptions Disp Refills    levothyroxine 25 MCG Oral Tab 90 tablet 1     Sig: Take 1 tablet (25 mcg total) by mouth daily.         Thyroid Medication Protocol Failed - 4/6/2022  9:45 PM        Failed - Last TSH value is normal     Lab Results   Component Value Date    TSH 7.010 (H) 01/10/2022                 Passed - TSH in past 12 months        Passed - Appointment in past 12 or next 3 months            Recent Outpatient Visits              2 weeks ago Postpartum care and examination    [facility], [address], [place], [place]    Postpartum    1 month ago Encounter for other contraceptive management    [facility], [address], [place], [place]    Telemedicine    2 months ago Acquired hypothyroidism    [facility], [address], [place] Yousif Quinteros, APRN    Telemedicine    2 months ago Encounter for supervision of normal first pregnancy in third trimester    [facility], [address], [address], CNM    Routine Prenatal    2 months ago Encounter for supervision of normal first pregnancy in third trimester    [facility] [place], [address], [place], [place]    Routine Prenatal

## 2022-05-09 RX ORDER — LEVONORGESTREL AND ETHINYL ESTRADIOL 0.1-0.02MG
1 KIT ORAL DAILY
Qty: 28 TABLET | Refills: 11 | OUTPATIENT
Start: 2022-05-09 | End: 2023-05-09

## 2022-05-09 NOTE — TELEPHONE ENCOUNTER
Please review. Protocol Failed or has no Protocol. Requested Prescriptions   Pending Prescriptions Disp Refills    levothyroxine 25 MCG Oral Tab 30 tablet 0     Sig: Take 1 tablet (25 mcg total) by mouth daily.         Thyroid Medication Protocol Failed - 5/9/2022 10:59 AM        Failed - Last TSH value is normal     Lab Results   Component Value Date    TSH 7.010 (H) 01/10/2022                 Passed - TSH in past 12 months        Passed - Appointment in past 12 or next 3 months             Recent Outpatient Visits              1 month ago Postpartum care and examination    [facility], [address], [place]    Postpartum    2 months ago Encounter for other contraceptive management    [facility], [address], [place]    Telemedicine    3 months ago Acquired hypothyroidism    [facility], [facility], [address], [place] Waban Pack, APRN    Telemedicine    3 months ago Encounter for supervision of normal first pregnancy in third trimester    [facility], [address], [place] CNM    Routine Prenatal    3 months ago Encounter for supervision of normal first pregnancy in third trimester    [facility] [place], [address], [place]    Routine Prenatal

## 2022-05-10 RX ORDER — LEVOTHYROXINE SODIUM 0.03 MG/1
25 TABLET ORAL DAILY
Qty: 30 TABLET | Refills: 0 | Status: SHIPPED | OUTPATIENT
Start: 2022-05-10 | End: 2022-06-17

## 2022-05-11 RX ORDER — LEVONORGESTREL AND ETHINYL ESTRADIOL 0.1-0.02MG
1 KIT ORAL DAILY
Qty: 28 TABLET | Refills: 11 | OUTPATIENT
Start: 2022-05-11 | End: 2023-05-11

## 2022-05-11 RX ORDER — LEVONORGESTREL AND ETHINYL ESTRADIOL 0.1-0.02MG
1 KIT ORAL DAILY
Qty: 84 TABLET | Refills: 5 | Status: SHIPPED | OUTPATIENT
Start: 2022-05-11 | End: 2023-05-11

## 2022-05-11 NOTE — TELEPHONE ENCOUNTER
1st attempt - [facility] message sent for patient to contact the office to schedule an appointment; see notes below.

## 2022-05-11 NOTE — TELEPHONE ENCOUNTER
Spoke to patient, informed patient 11 refills were sent in her pharmacy in March. Patient didn't know she had to  monthly it thought she can  all pills at once. Advised patient we can send in 3 month supply if she would like instead. Advised unsure if the insurance company will approve 3 month supply but can try. Patient would like to try so that she doesn't have to pick them up every month. rx sent to pharmacy.  Patient agrees with plan and verbally understands

## 2022-06-17 RX ORDER — LEVOTHYROXINE SODIUM 0.03 MG/1
25 TABLET ORAL DAILY
Qty: 30 TABLET | Refills: 0 | Status: SHIPPED | OUTPATIENT
Start: 2022-06-17

## 2022-06-18 ENCOUNTER — OFFICE VISIT (OUTPATIENT)
Dept: OBGYN CLINIC | Facility: CLINIC | Age: 27
End: 2022-06-18
Payer: COMMERCIAL

## 2022-06-18 VITALS
HEART RATE: 67 BPM | HEIGHT: 61 IN | BODY MASS INDEX: 20.2 KG/M2 | DIASTOLIC BLOOD PRESSURE: 70 MMHG | WEIGHT: 107 LBS | SYSTOLIC BLOOD PRESSURE: 103 MMHG

## 2022-06-18 DIAGNOSIS — Z01.419 WOMEN'S ANNUAL ROUTINE GYNECOLOGICAL EXAMINATION: Primary | ICD-10-CM

## 2022-06-18 DIAGNOSIS — Z30.41 SURVEILLANCE FOR BIRTH CONTROL, ORAL CONTRACEPTIVES: ICD-10-CM

## 2022-06-18 PROCEDURE — 3078F DIAST BP <80 MM HG: CPT | Performed by: ADVANCED PRACTICE MIDWIFE

## 2022-06-18 PROCEDURE — 3008F BODY MASS INDEX DOCD: CPT | Performed by: ADVANCED PRACTICE MIDWIFE

## 2022-06-18 PROCEDURE — 99395 PREV VISIT EST AGE 18-39: CPT | Performed by: ADVANCED PRACTICE MIDWIFE

## 2022-06-18 PROCEDURE — 3074F SYST BP LT 130 MM HG: CPT | Performed by: ADVANCED PRACTICE MIDWIFE

## 2022-07-19 RX ORDER — LEVONORGESTREL AND ETHINYL ESTRADIOL 0.1-0.02MG
1 KIT ORAL DAILY
Qty: 84 TABLET | Refills: 5 | OUTPATIENT
Start: 2022-07-19 | End: 2023-07-19

## 2022-07-20 RX ORDER — LEVOTHYROXINE SODIUM 0.03 MG/1
25 TABLET ORAL DAILY
Qty: 30 TABLET | Refills: 0 | Status: SHIPPED | OUTPATIENT
Start: 2022-07-20 | End: 2022-08-23

## 2022-08-23 RX ORDER — LEVOTHYROXINE SODIUM 0.03 MG/1
25 TABLET ORAL DAILY
Qty: 30 TABLET | Refills: 0 | Status: SHIPPED | OUTPATIENT
Start: 2022-08-23 | End: 2023-01-12

## 2022-08-24 NOTE — TELEPHONE ENCOUNTER
Talked to patient told her message below understood and she'll make an appointment thru her Mychart.

## 2022-11-08 ENCOUNTER — IMMUNIZATION (OUTPATIENT)
Dept: LAB | Facility: HOSPITAL | Age: 27
End: 2022-11-08
Attending: PREVENTIVE MEDICINE
Payer: COMMERCIAL

## 2022-11-08 DIAGNOSIS — Z23 NEED FOR VACCINATION: Primary | ICD-10-CM

## 2022-11-08 PROCEDURE — 90471 IMMUNIZATION ADMIN: CPT

## 2022-12-11 ENCOUNTER — HOSPITAL ENCOUNTER (EMERGENCY)
Facility: HOSPITAL | Age: 27
Discharge: HOME | End: 2022-12-11
Attending: EMERGENCY MEDICINE
Payer: COMMERCIAL

## 2022-12-11 VITALS
HEIGHT: 61 IN | WEIGHT: 105 LBS | TEMPERATURE: 98 F | HEART RATE: 78 BPM | SYSTOLIC BLOOD PRESSURE: 115 MMHG | OXYGEN SATURATION: 98 % | RESPIRATION RATE: 18 BRPM | DIASTOLIC BLOOD PRESSURE: 74 MMHG | BODY MASS INDEX: 19.83 KG/M2

## 2022-12-11 DIAGNOSIS — J06.9 UPPER RESPIRATORY TRACT INFECTION, UNSPECIFIED TYPE: ICD-10-CM

## 2022-12-11 DIAGNOSIS — J02.0 STREP PHARYNGITIS: Primary | ICD-10-CM

## 2022-12-11 LAB
FLUAV + FLUBV RNA SPEC NAA+PROBE: NEGATIVE
FLUAV + FLUBV RNA SPEC NAA+PROBE: NEGATIVE
RSV RNA SPEC NAA+PROBE: NEGATIVE
S PYO AG THROAT QL: POSITIVE
SARS-COV-2 RNA RESP QL NAA+PROBE: NOT DETECTED

## 2022-12-11 PROCEDURE — 99283 EMERGENCY DEPT VISIT LOW MDM: CPT

## 2022-12-11 PROCEDURE — 87880 STREP A ASSAY W/OPTIC: CPT

## 2022-12-11 PROCEDURE — 0241U SARS-COV-2/FLU A AND B/RSV BY PCR (GENEXPERT): CPT | Performed by: EMERGENCY MEDICINE

## 2022-12-11 RX ORDER — PENICILLIN V POTASSIUM 500 MG/1
500 TABLET ORAL 3 TIMES DAILY
Qty: 30 TABLET | Refills: 0 | Status: SHIPPED | OUTPATIENT
Start: 2022-12-11 | End: 2022-12-21

## 2022-12-11 RX ORDER — PENICILLIN V POTASSIUM 500 MG/1
500 TABLET ORAL 3 TIMES DAILY
Qty: 30 TABLET | Refills: 0 | Status: SHIPPED | OUTPATIENT
Start: 2022-12-11 | End: 2022-12-11

## 2022-12-11 RX ORDER — ALBUTEROL SULFATE 90 UG/1
2 AEROSOL, METERED RESPIRATORY (INHALATION) EVERY 4 HOURS PRN
Qty: 1 EACH | Refills: 0 | Status: SHIPPED | OUTPATIENT
Start: 2022-12-11 | End: 2022-12-11

## 2022-12-11 NOTE — ED INITIAL ASSESSMENT (HPI)
C/o sore throat & cough since Monday. Denies fevers. At home covid tests negative. Denies hx of strep throat.

## 2023-04-27 ENCOUNTER — HOSPITAL ENCOUNTER (EMERGENCY)
Facility: HOSPITAL | Age: 28
Discharge: HOME | End: 2023-04-27
Attending: EMERGENCY MEDICINE
Payer: COMMERCIAL

## 2023-04-27 VITALS
BODY MASS INDEX: 20 KG/M2 | TEMPERATURE: 98 F | OXYGEN SATURATION: 98 % | DIASTOLIC BLOOD PRESSURE: 84 MMHG | HEART RATE: 85 BPM | WEIGHT: 105 LBS | RESPIRATION RATE: 18 BRPM | SYSTOLIC BLOOD PRESSURE: 131 MMHG

## 2023-04-27 DIAGNOSIS — J02.0 STREP PHARYNGITIS: Primary | ICD-10-CM

## 2023-04-27 LAB — S PYO AG THROAT QL: POSITIVE

## 2023-04-27 PROCEDURE — 99284 EMERGENCY DEPT VISIT MOD MDM: CPT

## 2023-04-27 PROCEDURE — 99283 EMERGENCY DEPT VISIT LOW MDM: CPT

## 2023-04-27 PROCEDURE — 87880 STREP A ASSAY W/OPTIC: CPT

## 2023-04-27 RX ORDER — AMOXICILLIN 500 MG/1
500 TABLET, FILM COATED ORAL 2 TIMES DAILY
Qty: 20 TABLET | Refills: 0 | Status: SHIPPED | OUTPATIENT
Start: 2023-04-27 | End: 2023-05-07

## 2023-05-09 ENCOUNTER — OFFICE VISIT (OUTPATIENT)
Dept: FAMILY MEDICINE CLINIC | Facility: CLINIC | Age: 28
End: 2023-05-09

## 2023-05-09 VITALS
SYSTOLIC BLOOD PRESSURE: 117 MMHG | HEIGHT: 61 IN | DIASTOLIC BLOOD PRESSURE: 77 MMHG | WEIGHT: 102.19 LBS | HEART RATE: 67 BPM | BODY MASS INDEX: 19.29 KG/M2

## 2023-05-09 DIAGNOSIS — K21.9 GASTROESOPHAGEAL REFLUX DISEASE, UNSPECIFIED WHETHER ESOPHAGITIS PRESENT: ICD-10-CM

## 2023-05-09 DIAGNOSIS — E55.9 VITAMIN D DEFICIENCY: ICD-10-CM

## 2023-05-09 DIAGNOSIS — R35.0 URINARY FREQUENCY: ICD-10-CM

## 2023-05-09 DIAGNOSIS — Z00.00 WELL ADULT EXAM: Primary | ICD-10-CM

## 2023-05-09 DIAGNOSIS — Z30.41 SURVEILLANCE FOR BIRTH CONTROL, ORAL CONTRACEPTIVES: ICD-10-CM

## 2023-05-09 DIAGNOSIS — E03.9 ACQUIRED HYPOTHYROIDISM: ICD-10-CM

## 2023-05-09 PROCEDURE — 99395 PREV VISIT EST AGE 18-39: CPT | Performed by: NURSE PRACTITIONER

## 2023-05-09 PROCEDURE — 3078F DIAST BP <80 MM HG: CPT | Performed by: NURSE PRACTITIONER

## 2023-05-09 PROCEDURE — 3074F SYST BP LT 130 MM HG: CPT | Performed by: NURSE PRACTITIONER

## 2023-05-09 PROCEDURE — 3008F BODY MASS INDEX DOCD: CPT | Performed by: NURSE PRACTITIONER

## 2023-05-09 PROCEDURE — 99213 OFFICE O/P EST LOW 20 MIN: CPT | Performed by: NURSE PRACTITIONER

## 2023-05-09 RX ORDER — LEVOTHYROXINE SODIUM 0.03 MG/1
25 TABLET ORAL
Qty: 30 TABLET | Refills: 0 | Status: SHIPPED | OUTPATIENT
Start: 2023-05-09

## 2023-05-10 PROBLEM — R35.0 URINARY FREQUENCY: Status: ACTIVE | Noted: 2023-05-10

## 2023-05-10 PROBLEM — K21.9 GASTROESOPHAGEAL REFLUX DISEASE: Status: ACTIVE | Noted: 2023-05-10

## 2023-05-10 PROBLEM — Z30.41 SURVEILLANCE FOR BIRTH CONTROL, ORAL CONTRACEPTIVES: Status: ACTIVE | Noted: 2023-05-10

## 2023-05-10 NOTE — ASSESSMENT & PLAN NOTE
Check urinalysis w blood work in a couple of days  Pt just completed antibiotics course for strep throat

## 2023-05-10 NOTE — ASSESSMENT & PLAN NOTE
Limit acidic and spicy foods as those seem to be triggers  Please call if symptoms worsen or are not resolving.

## 2023-05-16 ENCOUNTER — LAB ENCOUNTER (OUTPATIENT)
Dept: LAB | Facility: HOSPITAL | Age: 28
End: 2023-05-16
Attending: NURSE PRACTITIONER
Payer: COMMERCIAL

## 2023-05-16 DIAGNOSIS — E55.9 VITAMIN D DEFICIENCY: Primary | ICD-10-CM

## 2023-05-16 DIAGNOSIS — Z00.00 WELL ADULT EXAM: ICD-10-CM

## 2023-05-16 DIAGNOSIS — E03.9 ACQUIRED HYPOTHYROIDISM: ICD-10-CM

## 2023-05-16 LAB
ALBUMIN SERPL-MCNC: 3.9 G/DL (ref 3.4–5)
ALBUMIN/GLOB SERPL: 0.9 {RATIO} (ref 1–2)
ALP LIVER SERPL-CCNC: 57 U/L
ALT SERPL-CCNC: 19 U/L
ANION GAP SERPL CALC-SCNC: 4 MMOL/L (ref 0–18)
AST SERPL-CCNC: 20 U/L (ref 15–37)
BILIRUB SERPL-MCNC: 0.6 MG/DL (ref 0.1–2)
BILIRUB UR QL: NEGATIVE
BUN BLD-MCNC: 17 MG/DL (ref 7–18)
BUN/CREAT SERPL: 27.9 (ref 10–20)
CALCIUM BLD-MCNC: 9.4 MG/DL (ref 8.5–10.1)
CHLORIDE SERPL-SCNC: 106 MMOL/L (ref 98–112)
CHOLEST SERPL-MCNC: 155 MG/DL (ref ?–200)
CLARITY UR: CLEAR
CO2 SERPL-SCNC: 29 MMOL/L (ref 21–32)
COLOR UR: YELLOW
CREAT BLD-MCNC: 0.61 MG/DL
DEPRECATED RDW RBC AUTO: 39.4 FL (ref 35.1–46.3)
ERYTHROCYTE [DISTWIDTH] IN BLOOD BY AUTOMATED COUNT: 11.6 % (ref 11–15)
EST. AVERAGE GLUCOSE BLD GHB EST-MCNC: 105 MG/DL (ref 68–126)
FASTING PATIENT LIPID ANSWER: YES
FASTING STATUS PATIENT QL REPORTED: YES
GFR SERPLBLD BASED ON 1.73 SQ M-ARVRAT: 126 ML/MIN/1.73M2 (ref 60–?)
GLOBULIN PLAS-MCNC: 4.2 G/DL (ref 2.8–4.4)
GLUCOSE BLD-MCNC: 83 MG/DL (ref 70–99)
GLUCOSE UR-MCNC: NORMAL MG/DL
HBA1C MFR BLD: 5.3 % (ref ?–5.7)
HCT VFR BLD AUTO: 39.6 %
HDLC SERPL-MCNC: 59 MG/DL (ref 40–59)
HGB BLD-MCNC: 12.9 G/DL
HGB UR QL STRIP.AUTO: NEGATIVE
KETONES UR-MCNC: NEGATIVE MG/DL
LDLC SERPL CALC-MCNC: 83 MG/DL (ref ?–100)
LEUKOCYTE ESTERASE UR QL STRIP.AUTO: NEGATIVE
MCH RBC QN AUTO: 29.9 PG (ref 26–34)
MCHC RBC AUTO-ENTMCNC: 32.6 G/DL (ref 31–37)
MCV RBC AUTO: 91.9 FL
NITRITE UR QL STRIP.AUTO: NEGATIVE
NONHDLC SERPL-MCNC: 96 MG/DL (ref ?–130)
OSMOLALITY SERPL CALC.SUM OF ELEC: 289 MOSM/KG (ref 275–295)
PH UR: 5.5 [PH] (ref 5–8)
PLATELET # BLD AUTO: 277 10(3)UL (ref 150–450)
POTASSIUM SERPL-SCNC: 3.5 MMOL/L (ref 3.5–5.1)
PROT SERPL-MCNC: 8.1 G/DL (ref 6.4–8.2)
PROT UR-MCNC: 30 MG/DL
RBC # BLD AUTO: 4.31 X10(6)UL
SODIUM SERPL-SCNC: 139 MMOL/L (ref 136–145)
SP GR UR STRIP: >1.03 (ref 1–1.03)
TRIGL SERPL-MCNC: 65 MG/DL (ref 30–149)
TSI SER-ACNC: 1.91 MIU/ML (ref 0.36–3.74)
UROBILINOGEN UR STRIP-ACNC: NORMAL
VIT B12 SERPL-MCNC: 835 PG/ML (ref 193–986)
VIT D+METAB SERPL-MCNC: 21.7 NG/ML (ref 30–100)
VLDLC SERPL CALC-MCNC: 10 MG/DL (ref 0–30)
WBC # BLD AUTO: 5.4 X10(3) UL (ref 4–11)

## 2023-05-16 PROCEDURE — 85027 COMPLETE CBC AUTOMATED: CPT

## 2023-05-16 PROCEDURE — 80061 LIPID PANEL: CPT

## 2023-05-16 PROCEDURE — 82607 VITAMIN B-12: CPT

## 2023-05-16 PROCEDURE — 80053 COMPREHEN METABOLIC PANEL: CPT

## 2023-05-16 PROCEDURE — 82306 VITAMIN D 25 HYDROXY: CPT | Performed by: NURSE PRACTITIONER

## 2023-05-16 PROCEDURE — 81001 URINALYSIS AUTO W/SCOPE: CPT | Performed by: NURSE PRACTITIONER

## 2023-05-16 PROCEDURE — 36415 COLL VENOUS BLD VENIPUNCTURE: CPT

## 2023-05-16 PROCEDURE — 84443 ASSAY THYROID STIM HORMONE: CPT

## 2023-05-16 PROCEDURE — 83036 HEMOGLOBIN GLYCOSYLATED A1C: CPT

## 2023-05-16 RX ORDER — CHOLECALCIFEROL (VITAMIN D3) 1250 MCG
50000 CAPSULE ORAL WEEKLY
Qty: 12 CAPSULE | Refills: 3 | Status: SHIPPED | OUTPATIENT
Start: 2023-05-16 | End: 2023-06-15

## 2023-06-05 DIAGNOSIS — E03.9 ACQUIRED HYPOTHYROIDISM: ICD-10-CM

## 2023-06-06 RX ORDER — LEVOTHYROXINE SODIUM 0.03 MG/1
25 TABLET ORAL
Qty: 90 TABLET | Refills: 1 | Status: SHIPPED | OUTPATIENT
Start: 2023-06-06

## 2023-06-06 NOTE — TELEPHONE ENCOUNTER
Refill passed per [place] [facility] [place], [facility] protocol. Vicki Cedeno, please advise.      Problem List Items Addressed This Visit      Acquired hypothyroidism       Has been without thryoid meds for 2 weeks  Will restart at 25 mcg daily and recheck labs        Requested Prescriptions   Pending Prescriptions Disp Refills    levothyroxine 25 MCG Oral Tab 30 tablet 0     Sig: Take 1 tablet (25 mcg total) by mouth before breakfast.       Thyroid Medication Protocol Passed - 6/5/2023  4:43 PM        Passed - TSH in past 12 months        Passed - Last TSH value is normal     Lab Results   Component Value Date    TSH 1.910 05/16/2023                 Passed - In person appointment or virtual visit in the past 12 mos or appointment in next 3 mos     Recent Outpatient Visits              3 weeks ago Well adult exam    Bettie Kuhn, Ariel Canchola, APRN    Office Visit    11 months ago [employer] [facility] annual routine gynecological examination    [address], [address], [address] [facility], [facility]    Office Visit    1 year ago Postpartum care and examination    [address], [address] Yoan Bowles, CNM    Postpartum    1 year ago Encounter for other contraceptive management    [facility], [address], [address], RAYNA    Telemedicine    1 year ago Acquired hypothyroidism    [facility], [address], Ariel Canchola, APRN    Telemedicine                          Recent Outpatient Visits              3 weeks ago Well adult exam    Ariel Walsh, APRN    Office Visit    11 months ago [employer] [facility] annual routine gynecological examination    [address], [address], [address], [facility]    Office Visit    1 year ago Postpartum care and examination    [facility], [address] Frutoso Anger, CNM    Postpartum    1 year ago [facility]counter for other contraceptive management    [facility], [address], [address], [place]    Telemedicine    1 year ago Acquired hypothyroidism    [facility], [address], Ariel Garcia, APRN    Telemedicine

## 2023-07-14 ENCOUNTER — TELEPHONE (OUTPATIENT)
Dept: FAMILY MEDICINE CLINIC | Facility: CLINIC | Age: 28
End: 2023-07-14

## 2023-07-14 DIAGNOSIS — R35.0 URINARY FREQUENCY: Primary | ICD-10-CM

## 2023-07-14 DIAGNOSIS — N92.6 MISSED MENSES: ICD-10-CM

## 2023-07-15 ENCOUNTER — LAB ENCOUNTER (OUTPATIENT)
Dept: LAB | Facility: HOSPITAL | Age: 28
End: 2023-07-15
Attending: NURSE PRACTITIONER
Payer: COMMERCIAL

## 2023-07-15 DIAGNOSIS — N92.6 MISSED MENSES: ICD-10-CM

## 2023-07-15 LAB
B-HCG SERPL-ACNC: <1 MIU/ML
BILIRUB UR QL: NEGATIVE
CLARITY UR: CLEAR
COLOR UR: YELLOW
GLUCOSE UR-MCNC: NORMAL MG/DL
HGB UR QL STRIP.AUTO: NEGATIVE
KETONES UR-MCNC: 10 MG/DL
LEUKOCYTE ESTERASE UR QL STRIP.AUTO: NEGATIVE
NITRITE UR QL STRIP.AUTO: NEGATIVE
PH UR: 5.5 [PH] (ref 5–8)
PROT UR-MCNC: 20 MG/DL
SP GR UR STRIP: 1.03 (ref 1–1.03)
UROBILINOGEN UR STRIP-ACNC: NORMAL

## 2023-07-15 PROCEDURE — 81001 URINALYSIS AUTO W/SCOPE: CPT | Performed by: NURSE PRACTITIONER

## 2023-07-15 PROCEDURE — 84702 CHORIONIC GONADOTROPIN TEST: CPT

## 2023-07-15 PROCEDURE — 36415 COLL VENOUS BLD VENIPUNCTURE: CPT

## 2023-09-26 RX ORDER — LEVONORGESTREL AND ETHINYL ESTRADIOL 0.1-0.02MG
1 KIT ORAL DAILY
Qty: 84 TABLET | Refills: 5 | OUTPATIENT
Start: 2023-09-26

## 2023-09-27 RX ORDER — LEVONORGESTREL AND ETHINYL ESTRADIOL 0.1-0.02MG
1 KIT ORAL DAILY
Qty: 84 TABLET | Refills: 3 | Status: SHIPPED | OUTPATIENT
Start: 2023-09-27 | End: 2024-09-26

## 2023-09-27 NOTE — TELEPHONE ENCOUNTER
Please review. Protocol failed or has no protocol. Requested Prescriptions   Pending Prescriptions Disp Refills    Levonorgestrel-Ethinyl Estrad (AVIANE) 0.1-20 MG-MCG Oral Tab 84 tablet 5     Sig: Take 1 tablet by mouth daily.        Gynecology Medication Protocol Failed - 9/27/2023  1:55 PM        Failed - Pass dependent on manual look-up of last PAP and patient compliance with PAP follow up recommendations        Passed - In person appointment or virtual visit in the past 12 mos or appointment in next 3 mos     Recent Outpatient Visits              4 months ago Well adult exam    [address], Ariel Quinteros, APRN    Office Visit    1 year ago [employer] Ste[facility] annual routine gynecological examination    [address], [address], [address], [address]    Office Visit    1 year ago Postpartum care and examination    [address], [address], [facility], [address]    Postpartum    1 year ago Encounter for other contraceptive management    [facility], [address], [address], Katelin Mario, RAYNA    Telemedicine    1 year ago Acquired hypothyroidism    [facility], [address], Ariel Trujilloa, APRN    Telemedicine                           Recent Outpatient Visits              4 months ago Well adult exam    [address], Ariel Quinteros, APRN    Office Visit    1 year ago [employer] Ste[facility] annual routine gynecological examination    [address], [address], [address], [address]    Office Visit    1 year ago Postpartum care and examination    [address], IlichoKatelin mcclendon CNM    Postpartum    1 year ago Encounter for other contraceptive management    [address], [address], Katelin Mario, [address] Telemedicine    1 year ago Acquired hypothyroidism    [facility], [address], [place] Liyah Rey, KATARZYNA    Telemedicine

## 2023-10-30 ENCOUNTER — TELEPHONE (OUTPATIENT)
Dept: FAMILY MEDICINE CLINIC | Facility: CLINIC | Age: 28
End: 2023-10-30

## 2023-10-30 NOTE — TELEPHONE ENCOUNTER
Patient calling about refill - see 9/26/23 Refill Encounter. Patient's date of birth and full name both confirmed. Says that Dinah does not have it   And that she received a message from KATARZYNA to schedule annual physical, but she had annual physica in May 9, 2023. RN provided education -Pap Smear needed for safe management of birth control medication. She is due. Offered to schedule pap smear. She says she will schedule on a different date, using [employer]. RN advised her to call Dinah in [facility] (she wants to use that location) or Dinah in [place], and speak with someone directly. Because they had told her it will be delivered to her home about 1 month ago, but nothing received. RN advised this needs to be discussed with pharmacy directly. And to coordinate with them if prescription to go to [facility]. Advised Rx has 3 refills. She verbalizes understanding of all information, and agreeable to plan. And she will schedule pap smear. Outpatient Medication Detail     Disp Refills Start End    Levonorgestrel-Ethinyl Estrad (AVIANE) 0.1-20 MG-MCG Oral Tab 84 tablet 3 9/27/2023 9/26/2024    Sig - Route: Take 1 tablet by mouth daily. - Oral    Sent to pharmacy as: Levonorgestrel-Ethinyl Estrad 0.1-20 MG-MCG Oral Tablet (Aviane)    E-Prescribing Status: Receipt confirmed by pharmacy (9/27/2023  7:03 PM CDT)      Associated Diagnoses    Postpartum care and examination        [address], [phone], [phone]     No future appointments.

## 2023-10-31 RX ORDER — LEVONORGESTREL AND ETHINYL ESTRADIOL 0.1-0.02MG
1 KIT ORAL DAILY
Qty: 84 TABLET | Refills: 3 | Status: SHIPPED | OUTPATIENT
Start: 2023-10-31

## 2023-12-03 DIAGNOSIS — E03.9 ACQUIRED HYPOTHYROIDISM: ICD-10-CM

## 2023-12-03 RX ORDER — LEVOTHYROXINE SODIUM 0.03 MG/1
25 TABLET ORAL
Qty: 90 TABLET | Refills: 3 | Status: SHIPPED | OUTPATIENT
Start: 2023-12-03

## 2023-12-03 NOTE — TELEPHONE ENCOUNTER
Refill passed per [facility] protocol.    Requested Prescriptions   Pending Prescriptions Disp Refills    LEVOTHYROXINE 25 MCG Oral Tab [Pharmacy Med Name: LEVOTHYROXINE 0.025MG (25MCG) TAB] 90 tablet 1     Sig: TAKE 1 TABLET(25 MCG) BY MOUTH BEFORE BREAKFAST       Thyroid Medication Protocol Passed - 12/3/2023  4:01 AM        Passed - TSH in past 12 months        Passed - Last TSH value is normal     Lab Results   Component Value Date    TSH 1.910 05/16/2023                 Passed - In person appointment or virtual visit in the past 12 mos or appointment in next 3 mos     Recent Outpatient Visits              6 months ago Well adult exam    [facility] [place] Marilu Oliveira APRN    Office Visit    1 year ago [facility]'s annual routine [facility]    Office Visit    1 year ago Postpartum care and examination    [facility] Ashley Young, CNTOSHA    Postpartum    1 year ago Encounter for other contraceptive management    [facility]    Telemedicine    1 year ago Acquired hypothyroidism    [facility] [place] Marilu Oliveira APRN    Telemedicine                               Recent Outpatient Visits              6 months ago Well adult exam    [facility], [place] APRN    Office Visit    1 year ago Women's annual routine gynecological examination    [facility]    Office Visit    1 year ago Postpartum care and examination    [facility]    Postpartum    1 year ago Encounter for other contraceptive management    [facility] Ashley Young CNM    Telemedicine    1 year ago Acquired hypothyroidism    [facility], Marilu House, APRN    Telemedicine

## 2024-01-26 ENCOUNTER — OFFICE VISIT (OUTPATIENT)
Dept: FAMILY MEDICINE CLINIC | Facility: CLINIC | Age: 29
End: 2024-01-26

## 2024-01-26 ENCOUNTER — LAB ENCOUNTER (OUTPATIENT)
Dept: LAB | Age: 29
End: 2024-01-26
Attending: NURSE PRACTITIONER
Payer: COMMERCIAL

## 2024-01-26 VITALS
HEART RATE: 68 BPM | BODY MASS INDEX: 19.07 KG/M2 | HEIGHT: 61 IN | SYSTOLIC BLOOD PRESSURE: 109 MMHG | DIASTOLIC BLOOD PRESSURE: 72 MMHG | WEIGHT: 101 LBS

## 2024-01-26 DIAGNOSIS — Z32.01 POSITIVE PREGNANCY TEST: ICD-10-CM

## 2024-01-26 DIAGNOSIS — N92.6 MISSED MENSES: Primary | ICD-10-CM

## 2024-01-26 DIAGNOSIS — E03.9 ACQUIRED HYPOTHYROIDISM: ICD-10-CM

## 2024-01-26 LAB
CONTROL LINE PRESENT WITH A CLEAR BACKGROUND (YES/NO): YES YES/NO
KIT LOT #: NORMAL NUMERIC
PREGNANCY TEST, URINE: POSITIVE
TSI SER-ACNC: 3.07 MIU/ML (ref 0.55–4.78)

## 2024-01-26 PROCEDURE — 81025 URINE PREGNANCY TEST: CPT | Performed by: NURSE PRACTITIONER

## 2024-01-26 PROCEDURE — 36415 COLL VENOUS BLD VENIPUNCTURE: CPT

## 2024-01-26 PROCEDURE — 84443 ASSAY THYROID STIM HORMONE: CPT

## 2024-01-26 PROCEDURE — 3074F SYST BP LT 130 MM HG: CPT | Performed by: NURSE PRACTITIONER

## 2024-01-26 PROCEDURE — 3078F DIAST BP <80 MM HG: CPT | Performed by: NURSE PRACTITIONER

## 2024-01-26 PROCEDURE — 99213 OFFICE O/P EST LOW 20 MIN: CPT | Performed by: NURSE PRACTITIONER

## 2024-01-26 PROCEDURE — 3008F BODY MASS INDEX DOCD: CPT | Performed by: NURSE PRACTITIONER

## 2024-01-26 NOTE — PROGRESS NOTES
Menstrual Problem  Pertinent negatives include no nausea or vomiting.     Pt here for positive home pregnancy test.    Is not taking prenatal vitamins yet-will start    No symptoms of morning sickness at this time.     LMP 12/11 or 12/12    Review of Systems   Constitutional:  Negative for activity change and appetite change.   Gastrointestinal:  Negative for nausea and vomiting.   Genitourinary:  Positive for menstrual problem.       Vitals:    01/26/24 1031   BP: 109/72   Pulse: 68   Weight: 101 lb (45.8 kg)   Height: 5' 1\" (1.549 m)     Body mass index is 19.08 kg/m².  Wt Readings from Last 6 Encounters:   01/26/24 101 lb (45.8 kg)   05/09/23 102 lb 3.2 oz (46.4 kg)   04/27/23 105 lb (47.6 kg)   12/11/22 105 lb (47.6 kg)   06/18/22 107 lb (48.5 kg)   03/18/22 109 lb (49.4 kg)        Health Maintenance   Topic Date Due    COVID-19 Vaccine (4 - 2023-24 season) 09/01/2023    Influenza Vaccine (1) 10/01/2023    Annual Depression Screening  01/01/2024    Pap Smear  03/22/2024    Annual Physical  05/09/2024    DTaP,Tdap,and Td Vaccines (2 - Td or Tdap) 06/03/2029    Pneumococcal Vaccine: Birth to 64yrs  Aged Out       Patient's last menstrual period was 12/12/2023 (within days).    Past Medical History:   Diagnosis Date    Thyroid disease     2 years ago on meds       .  Past Surgical History:   Procedure Laterality Date    Keenes teeth removed  2021       Family History   Problem Relation Age of Onset    Hypertension Father     Hypertension Paternal Grandmother     Other (Other) Paternal Grandmother         kidney stones    Hypertension Paternal Grandfather        Social History     Socioeconomic History    Marital status:      Spouse name: justin Leone    Number of children: 0    Years of education: Not on file    Highest education level: Bachelor's degree (e.g., BA, AB, BS)   Occupational History    Not on file   Tobacco Use    Smoking status: Never    Smokeless tobacco: Never   Vaping Use    Vaping Use: Never  used   Substance and Sexual Activity    Alcohol use: Not Currently    Drug use: Never    Sexual activity: Not on file   Other Topics Concern     Service Not Asked    Blood Transfusions No    Caffeine Concern No    Occupational Exposure No    Hobby Hazards Not Asked    Sleep Concern Not Asked    Stress Concern No    Weight Concern Not Asked    Special Diet No    Back Care Not Asked    Exercise No    Bike Helmet No    Seat Belt Yes    Self-Exams Not Asked   Social History Narrative    Not on file     Social Determinants of Health     Financial Resource Strain: Not on file   Food Insecurity: Not on file   Transportation Needs: Not on file   Physical Activity: Not on file   Stress: Not on file   Social Connections: Not on file   Housing Stability: Not on file       Current Outpatient Medications   Medication Sig Dispense Refill    levothyroxine 25 MCG Oral Tab Take 1 tablet (25 mcg total) by mouth before breakfast. 90 tablet 3       Allergies:  No Known Allergies    Physical Exam  Vitals and nursing note reviewed.   Constitutional:       Appearance: Normal appearance.   Cardiovascular:      Rate and Rhythm: Normal rate.   Pulmonary:      Effort: Pulmonary effort is normal. No respiratory distress.   Skin:     General: Skin is warm and dry.   Neurological:      Mental Status: She is alert and oriented to person, place, and time.         Assessment and Plan:  Problem List Items Addressed This Visit       Acquired hypothyroidism     Continue present management  Check tsh         Relevant Orders    TSH W Reflex To Free T4    Missed menses - Primary     Positive urine pregnancy test  Start prenatal vitamins daily  Refer midwife group         Relevant Orders    POC Urine pregnancy test [43625]    OBG - INTERNAL    Positive pregnancy test     Positive urine pregnancy test         Relevant Orders    OBG - INTERNAL                   Discussed plan of care with pt and pt is in agreement.All questions answered. Pt to call  with questions or concerns.    Encouraged to sign up for My Chart if not already registered.

## 2024-01-31 ENCOUNTER — APPOINTMENT (OUTPATIENT)
Dept: ULTRASOUND IMAGING | Facility: HOSPITAL | Age: 29
End: 2024-01-31
Attending: STUDENT IN AN ORGANIZED HEALTH CARE EDUCATION/TRAINING PROGRAM
Payer: COMMERCIAL

## 2024-01-31 ENCOUNTER — HOSPITAL ENCOUNTER (EMERGENCY)
Facility: HOSPITAL | Age: 29
Discharge: HOME | End: 2024-01-31
Attending: STUDENT IN AN ORGANIZED HEALTH CARE EDUCATION/TRAINING PROGRAM
Payer: COMMERCIAL

## 2024-01-31 VITALS
OXYGEN SATURATION: 98 % | DIASTOLIC BLOOD PRESSURE: 58 MMHG | BODY MASS INDEX: 19.83 KG/M2 | SYSTOLIC BLOOD PRESSURE: 102 MMHG | HEIGHT: 61 IN | TEMPERATURE: 98 F | HEART RATE: 63 BPM | WEIGHT: 105 LBS | RESPIRATION RATE: 18 BRPM

## 2024-01-31 DIAGNOSIS — O20.9 VAGINAL BLEEDING AFFECTING EARLY PREGNANCY: Primary | ICD-10-CM

## 2024-01-31 LAB
ANION GAP SERPL CALC-SCNC: 8 MMOL/L (ref 0–18)
B-HCG SERPL-ACNC: ABNORMAL MIU/ML
BASOPHILS # BLD AUTO: 0.01 X10(3) UL (ref 0–0.2)
BASOPHILS NFR BLD AUTO: 0.1 %
BILIRUB UR QL CFM: NEGATIVE
BUN BLD-MCNC: 12 MG/DL (ref 9–23)
BUN/CREAT SERPL: 19 (ref 10–20)
CALCIUM BLD-MCNC: 9.4 MG/DL (ref 8.7–10.4)
CHLORIDE SERPL-SCNC: 104 MMOL/L (ref 98–112)
CO2 SERPL-SCNC: 26 MMOL/L (ref 21–32)
COLOR UR: YELLOW
CREAT BLD-MCNC: 0.63 MG/DL
DEPRECATED RDW RBC AUTO: 37.4 FL (ref 35.1–46.3)
EGFRCR SERPLBLD CKD-EPI 2021: 125 ML/MIN/1.73M2 (ref 60–?)
EOSINOPHIL # BLD AUTO: 0.03 X10(3) UL (ref 0–0.7)
EOSINOPHIL NFR BLD AUTO: 0.3 %
ERYTHROCYTE [DISTWIDTH] IN BLOOD BY AUTOMATED COUNT: 11.7 % (ref 11–15)
GLUCOSE BLD-MCNC: 83 MG/DL (ref 70–99)
GLUCOSE UR-MCNC: NEGATIVE MG/DL
HCT VFR BLD AUTO: 35.1 %
HGB BLD-MCNC: 12.6 G/DL
IMM GRANULOCYTES # BLD AUTO: 0.03 X10(3) UL (ref 0–1)
IMM GRANULOCYTES NFR BLD: 0.3 %
KETONES UR-MCNC: 15 MG/DL
LEUKOCYTE ESTERASE UR QL STRIP.AUTO: NEGATIVE
LYMPHOCYTES # BLD AUTO: 1.62 X10(3) UL (ref 1–4)
LYMPHOCYTES NFR BLD AUTO: 18 %
MCH RBC QN AUTO: 32.1 PG (ref 26–34)
MCHC RBC AUTO-ENTMCNC: 35.9 G/DL (ref 31–37)
MCV RBC AUTO: 89.5 FL
MONOCYTES # BLD AUTO: 0.71 X10(3) UL (ref 0.1–1)
MONOCYTES NFR BLD AUTO: 7.9 %
NEUTROPHILS # BLD AUTO: 6.62 X10 (3) UL (ref 1.5–7.7)
NEUTROPHILS # BLD AUTO: 6.62 X10(3) UL (ref 1.5–7.7)
NEUTROPHILS NFR BLD AUTO: 73.4 %
NITRITE UR QL STRIP.AUTO: NEGATIVE
OSMOLALITY SERPL CALC.SUM OF ELEC: 285 MOSM/KG (ref 275–295)
PH UR: 5.5 [PH] (ref 5–8)
PLATELET # BLD AUTO: 244 10(3)UL (ref 150–450)
POTASSIUM SERPL-SCNC: 3.3 MMOL/L (ref 3.5–5.1)
RBC # BLD AUTO: 3.92 X10(6)UL
RBC #/AREA URNS AUTO: >10 /HPF
SODIUM SERPL-SCNC: 138 MMOL/L (ref 136–145)
SP GR UR STRIP: >=1.03 (ref 1–1.03)
UROBILINOGEN UR STRIP-ACNC: 0.2
WBC # BLD AUTO: 9 X10(3) UL (ref 4–11)

## 2024-01-31 PROCEDURE — 80048 BASIC METABOLIC PNL TOTAL CA: CPT | Performed by: STUDENT IN AN ORGANIZED HEALTH CARE EDUCATION/TRAINING PROGRAM

## 2024-01-31 PROCEDURE — 84702 CHORIONIC GONADOTROPIN TEST: CPT | Performed by: STUDENT IN AN ORGANIZED HEALTH CARE EDUCATION/TRAINING PROGRAM

## 2024-01-31 PROCEDURE — 81001 URINALYSIS AUTO W/SCOPE: CPT | Performed by: STUDENT IN AN ORGANIZED HEALTH CARE EDUCATION/TRAINING PROGRAM

## 2024-01-31 PROCEDURE — 36415 COLL VENOUS BLD VENIPUNCTURE: CPT

## 2024-01-31 PROCEDURE — 76817 TRANSVAGINAL US OBSTETRIC: CPT | Performed by: STUDENT IN AN ORGANIZED HEALTH CARE EDUCATION/TRAINING PROGRAM

## 2024-01-31 PROCEDURE — 99285 EMERGENCY DEPT VISIT HI MDM: CPT

## 2024-01-31 PROCEDURE — 85025 COMPLETE CBC W/AUTO DIFF WBC: CPT | Performed by: STUDENT IN AN ORGANIZED HEALTH CARE EDUCATION/TRAINING PROGRAM

## 2024-01-31 PROCEDURE — 76801 OB US < 14 WKS SINGLE FETUS: CPT | Performed by: STUDENT IN AN ORGANIZED HEALTH CARE EDUCATION/TRAINING PROGRAM

## 2024-01-31 PROCEDURE — 81015 MICROSCOPIC EXAM OF URINE: CPT | Performed by: STUDENT IN AN ORGANIZED HEALTH CARE EDUCATION/TRAINING PROGRAM

## 2024-01-31 NOTE — ED PROVIDER NOTES
Patient Seen in: [facility] Emergency Department      History     Chief Complaint   Patient presents with    Bleeding     Stated Complaint: 7 weeks pregnant, bleeding    Subjective:   HPI    28-year-old female G2, P1 history of hypothyroidism presenting for evaluation of bleeding in early pregnancy.  Onset of symptoms this morning.  Woke up with blood in underwear, which is since improved significantly.  No passage of blood clots.  In the past 2 days has been normal state of health.  Had mild cramping in lower abdomen earlier today now resolved.  No fevers chills night sweats.  No vaginal bleeding or discharge.    Objective:   Past Medical History:   Diagnosis Date    Thyroid disease     2 years ago on meds              Past Surgical History:   Procedure Laterality Date    WISDOM TEETH REMOVED  2021                Social History     Socioeconomic History    Marital status:      Spouse name: justin Leone    Number of children: 0    Highest education level: Bachelor's degree (e.g., BA, AB, BS)   Tobacco Use    Smoking status: Never    Smokeless tobacco: Never   Vaping Use    Vaping Use: Never used   Substance and Sexual Activity    Alcohol use: Not Currently    Drug use: Never   Other Topics Concern    Blood Transfusions No    Caffeine Concern No    Occupational Exposure No    Stress Concern No    Special Diet No    Exercise No    Bike Helmet No    Seat Belt Yes              Review of Systems    Positive for stated complaint: 7 weeks pregnant, bleeding  Other systems are as noted in HPI.  Constitutional and vital signs reviewed.      All other systems reviewed and negative except as noted above.    Physical Exam     ED Triage Vitals [01/31/24 0732]   /84   Pulse 97   Resp 18   Temp 97.7 °F (36.5 °C)   Temp src Temporal   SpO2 97 %   O2 Device None (Room air)       Current:/58   Pulse 63   Temp 97.7 °F (36.5 °C) (Temporal)   Resp 18   Ht 154.9 cm (5' 1\")   Wt 47.6 kg   LMP 12/12/2023  (Within Days)   SpO2 98%   BMI 19.84 kg/m²         Physical Exam    Constitutional: awake, alert, no sig distress  HENT: mmm, no lesions,  Neck: normal range of motion, no tenderness, supple.  Eyes: PERRL, EOMI, conjunctiva normal, no discharge. Sclera anicteric.  Cardiovascular: rr no murmur  Respiratory: Normal breath sounds, no respiratory distress, no wheezing, no chest tenderness.  GI: Bowel sounds normal, Soft, no tenderness, no masses, no pulsatile masses.  : No CVA tenderness.  Skin: Warm, dry, no erythema, no rash.  Musculoskeletal: Intact distal pulses, no edema, no tenderness, no cyanosis, no clubbing. Good range of motion in all major joints. No tenderness to palpation or major deformities noted. Back- No tenderness.  Neurologic: Alert & oriented x 3, normal motor function, normal sensory function, no focal deficits noted.  Psych: Calm, cooperative, nl affect    ED Course     Labs Reviewed   BASIC METABOLIC PANEL (8) - Abnormal; Notable for the following components:       Result Value    Potassium 3.3 (*)     All other components within normal limits   URINALYSIS WITH CULTURE REFLEX - Abnormal; Notable for the following components:    Clarity Urine Turbid (*)     Ketones Urine 15 (*)     Blood Urine Large (*)     Protein Urine 100 mg/dL (*)     All other components within normal limits   HCG, BETA SUBUNIT (QUANT PREGNANCY TEST) - Abnormal; Notable for the following components:    Hcg Quantitative 10,777.1 (*)     All other components within normal limits   UA MICROSCOPIC ONLY, URINE - Abnormal; Notable for the following components:    RBC Urine >10 (*)     Bacteria Urine 1+ (*)     Squamous Epi. Cells Few (*)     All other components within normal limits   ICTOTEST - Normal   CBC WITH DIFFERENTIAL WITH PLATELET    Narrative:     The following orders were created for panel order CBC With Differential With Platelet.  Procedure                               Abnormality         Status                      ---------                               -----------         ------                     CBC W/ DIFFERENTIAL[[id number]]                              Final result                 Please view results for these tests on the individual orders.   CBC W/ DIFFERENTIAL          ED Course as of 01/31/24 1656  ------------------------------------------------------------  Time: 01/31 0905  Comment: Known blood type O+ per previous testing  ------------------------------------------------------------  Time: 01/31 1108  Comment: CONCLUSION:      1. Gestational sac measuring 9 mm with a 3 mm yolk sac.  No fetal pole is identified.  Recommend follow-up pelvic ultrasound in 10 days as well as serial beta HCGs.   2. A 2.2 cm subchorionic hemorrhage that abuts the gestational sac.   3. A 2.3 cm left corpus luteal cyst.   4. Small volume of complex free fluid in the pelvis.                 MDM      28-year-old female presenting with vaginal bleeding in early pregnancy of unknown location.  On arrival vitals are stable reassuring.  DDx: Ectopic pregnancy, threatened miscarriage, subchorionic hemorrhage, hemorrhagic cystitis  -Reviewed previous record, prevention was previously noted to be Rh+  Plan labs urinalysis beta quant and ultrasound    Reviewed imaging with patient, understands need for 48-hour follow-up for repeat beta quant, follow-up with OB for repeat ultrasound.  Abdominal exam is benign, lower ongoing concern for ectopic pregnancy.  Return precautions and follow-up instructions were discussed with patient who voiced understanding and agreement the plan.  All questions were answered to patient satisfaction.                           Medical Decision Making      Disposition and Plan     Clinical Impression:  1. Vaginal bleeding affecting early pregnancy         Disposition:  Discharge  1/31/2024 11:54 am    Follow-up:  Marilu Oliveira, APRN  [address] [address]  [phone]    Call  today      [facility] Emergency Department  [address]  [facility] [address]  [phone]  Follow up  As needed, If symptoms worsen          Medications Prescribed:  Discharge Medication List as of 1/31/2024 11:56 AM

## 2024-01-31 NOTE — DISCHARGE INSTRUCTIONS
Your testing today is indeterminate and you require further blood testing in 48 hrs to determine if  your pregnancy is in the uterus (safe) or if it outside the uterus (a medical emergency called an  ectopic pregnancy.) Please call the OBGYN for an appointment in exactly 2 days. You will  need to have a blood test taken the same day as your appointment. Take the prescription for  the blood test to any lab center or to the OBGYN office that day. It is very important that you  return to the emergency department if you develop abdominal pain, if you faint, if you develop  dizziness, weakness, chest pain, difficulty breathing or if your vaginal bleeding is soaking > 1  pad per hr as these can be signs of an ectopic pregnancy.

## 2024-01-31 NOTE — ED INITIAL ASSESSMENT (HPI)
Patient to ED for vaginal bleeding beginning this am at 0600. Pt is 7 weeks pregnant. Denies cramping, states mild tenderness. Denies clots, dizziness.

## 2024-02-02 ENCOUNTER — LAB ENCOUNTER (OUTPATIENT)
Dept: LAB | Facility: HOSPITAL | Age: 29
End: 2024-02-02
Attending: ADVANCED PRACTICE MIDWIFE
Payer: COMMERCIAL

## 2024-02-02 ENCOUNTER — OFFICE VISIT (OUTPATIENT)
Dept: OBGYN CLINIC | Facility: CLINIC | Age: 29
End: 2024-02-02

## 2024-02-02 VITALS
DIASTOLIC BLOOD PRESSURE: 70 MMHG | HEART RATE: 76 BPM | WEIGHT: 103 LBS | HEIGHT: 61 IN | BODY MASS INDEX: 19.45 KG/M2 | SYSTOLIC BLOOD PRESSURE: 109 MMHG

## 2024-02-02 DIAGNOSIS — O36.80X0 PREGNANCY WITH UNCERTAIN FETAL VIABILITY, SINGLE OR UNSPECIFIED FETUS: ICD-10-CM

## 2024-02-02 DIAGNOSIS — O20.9 VAGINAL BLEEDING AFFECTING EARLY PREGNANCY: ICD-10-CM

## 2024-02-02 DIAGNOSIS — Z12.4 PAP SMEAR FOR CERVICAL CANCER SCREENING: ICD-10-CM

## 2024-02-02 DIAGNOSIS — Z11.3 SCREENING FOR STD (SEXUALLY TRANSMITTED DISEASE): ICD-10-CM

## 2024-02-02 DIAGNOSIS — Z3A.01 7 WEEKS GESTATION OF PREGNANCY: Primary | ICD-10-CM

## 2024-02-02 DIAGNOSIS — O26.859 SPOTTING IN EARLY PREGNANCY: ICD-10-CM

## 2024-02-02 LAB — B-HCG SERPL-ACNC: ABNORMAL MIU/ML

## 2024-02-02 PROCEDURE — 99213 OFFICE O/P EST LOW 20 MIN: CPT | Performed by: ADVANCED PRACTICE MIDWIFE

## 2024-02-02 PROCEDURE — 84702 CHORIONIC GONADOTROPIN TEST: CPT

## 2024-02-02 PROCEDURE — 36415 COLL VENOUS BLD VENIPUNCTURE: CPT

## 2024-02-02 NOTE — PATIENT INSTRUCTIONS
Healthy Eating Habits During Pregnancy    It’s important to develop healthy eating habits while you are pregnant, for you as well as for your baby. Here are some ways to stay healthy.  Aim for a healthy weight  A slow, steady rate of weight gain is often best. After the first trimester, you may gain about a pound a week. If you were overweight before pregnancy, you need to gain fewer pounds. Your healthcare provider can give you a healthy weight goal for your pregnancy.  Don’t diet  Now is not the time to diet. You may not get enough of the nutrients you and your baby need. Instead, learn how to be a healthy eater. Start by doing it for your baby. Soon, you may do it for yourself.  Vitamins and supplements  Talk with your healthcare provider about taking these and other prenatal vitamins and supplements.  Iron makes the extra blood you need now.  Calcium and vitamin D help build and keep strong bones.  Folic acid helps prevent certain birth defects.  Iodine helps the thyroid work right.  Some vitamins may not be safe to take. Your healthcare provider will tell you which ones to avoid.  Fluids  Drink at least 8 to 10 cups of fluid daily. Your baby needs fluids. Fluids also decrease constipation, flush out toxins and waste, limit swelling, and help prevent bladder infections. Water is best. Other good choices are:  Water or seltzer water with a slice of lemon or lime (These can also help ease an upset stomach.)  Clear soups that are low in salt  Low-fat or fat-free milk, soy or rice milk with calcium added  Popsicles or gelatin  Things to avoid  Some things might harm your growing baby. Don’t eat or drink:  Alcohol  Unpasteurized dairy foods and juices  Raw or undercooked meat, poultry, fish, or eggs  Unwashed fruits and vegetables  Prepared meats, like deli meats or hot dogs, unless heated until steaming hot  Fish that are high in mercury, like shark, swordfish, jolanta mackerel, tilefish, and albacore tuna  Things to  limit  Ask your healthcare provider whether it’s safe to eat or drink:  Caffeine  Artificial sweeteners  Organ meats  Certain types of fish  Fish and shellfish that contain mercury in lower amounts, like shrimp, canned light tuna, salmon, pollock, and catfish  Manny last reviewed this educational content on 2018 The [employer]. [address] [place] [address], [place] [address]. All rights reserved. This information is not intended as a substitute for professional medical care. Always follow your healthcare professional's instructions.        Nutrition During Pregnancy    Having a healthy baby depends mostly on you. What you eat matters to your baby and your health. During pregnancy, you will likely need about 300 more calories per day than before you became pregnant. Each day, try to eat the number of servings listed here for each food group. In addition, cut down on salt and caffeine. Limit the amount of sweets and high-fat foods you eat. Don’t smoke or drink alcohol.  Important: See your healthcare provider as often as requested. If you have any questions, be sure to ask them.  Fruits  2 cups  Examples of 1-cup servings:  1 medium apple  1 medium orange  1 medium banana  1 cup chopped fruit  1 cup 100% fruit juice (pasteurized)  1/2 cup dried fruit Vegetables  2-1/2 to 3 cups   Examples of 1 servin cups raw, leafy greens  1 cup raw or cooked cut-up vegetables  1 cup 100% vegetable juice (pasteurized) Grains & Cereals*  6 to 8 ounces  Examples of 1-ounce servings:  1 slice bread  1/2 cup cooked rice  1/2 cup cooked cereal  1/2 cup pasta  1 ounce cold cereal Fats & Oils  6 to 8 teaspoons   Dairy**  3 cups  Examples of 1-cup servings:  1 cup milk  1 cup yogurt  1-1/2 ounces natural cheese  2 ounces processed cheese Protein---  5 to 6-1/2 ounces  Examples of 1-ounce servings:  1 egg  1 ounce of lean meat, poultry, or fish  1/4 cup cooked beans  1 tablespoon peanut butter  1/2 ounce nuts  Fluids  8 or more 8-ounce glasses  Examples:  Water  Diluted juices: Apple, orange, cranberry  Mineral water  Clear soups, broth     *Note: Choose whole grains whenever possible.  ** Note: Try to choose low-fat options; avoid soft cheeses and unpasteurized milk.  --- Notes: Avoid raw or undercooked meats, eggs, and seafood. fish, and shellfish. Also, some types of fish, like shark, swordfish, and jolanta mackerel should not be eaten during pregnancy. Avoid hot dogs, luncheon meats, and cold cuts unless heated to steaming just before being served. Ask your healthcare provider about safe choices.     Prenatal supplements  A prenatal supplement is a pill that you take daily during pregnancy. It helps make sure you’re getting the right amount of certain nutrients that are important to your baby. Ask your healthcare provider to help you choose the best one for you. Important nutrients during pregnancy include:  Folic acid. It's best to start taking this supplement 1 month before you start trying to get pregnant. Folic acid helps prevent certain problems in your baby. During pregnancy, you need to take 400 micrograms (mcg) of folic acid every day for the first 2 to 3 months after conception, and then 600 mcg is needed for growing fetus and placenta.  Iron, calcium, and vitamin D. You may also be advised to take these supplements during pregnancy. They help keep you and your baby healthy. Be sure to take them at different times because calcium makes it hard for the body to absorb iron. Taking iron with orange juice helps to increase its absorption.   Leotus last reviewed this educational content on 12/1/2017 © 2000-2020 [employer]. [address] [place] [address], [place] [address]. All rights reserved. This information is not intended as a substitute for professional medical care. Always follow your healthcare professional's instructions.        Pregnancy: Planning Your Exercise Routine    While you’re pregnant, an  exercise routine helps both your mind and your body feel good. It tones your muscles and makes them stronger. It also gives you and your baby more oxygen.  The right exercise for you  Overall conditioning is best for you and your baby. Try walking, swimming, or riding a stationary bike. Always warm up, cool down, and drink enough fluids. Keep a snack close by in case your blood sugar gets low. Discuss exercise choices with your healthcare provider. Talk about the following:  If you already exercise, find out how to adapt your routine while you’re pregnant. Keep the intensity of the exercise moderate.  Ask if there are any local prenatal exercise classes, like yoga or water aerobics. Find out which prenatal exercise videos are good choices.  If you were not exercising before your pregnancy, find out the best way to start. Now is not the time to begin a new workout on your own. Start slowly. Listen to your body.  Ask which forms of exercise you should avoid. These may include risky activities like hot yoga, horseback riding, scuba diving, skiing, skating, and contact sports.  Pelvic tilts  These help strengthen your stomach muscles and low back. You can do pelvic tilts instead of sit-ups.  Do this exercise on your hands and knees.  Relax the back of your neck. Pull your stomach in until your low back flattens.  Hold for 30 seconds. Release. Repeat 10 times. Do this twice a day.  Kegel exercises  Kegel exercises strengthen the pelvic muscles. Doing Kegels daily helps prepare these muscles for delivery. Kegels also help ease your recovery. You exercise these muscles by tightening, holding, then relaxing them. To do 1 type of Kegel exercise, contract as if you were stopping your urine stream (but do it when you’re not urinating). Hold for 10 seconds, then repeat 10 times, a few times a day.  Tips to add activity  Here are some tips to follow:  Park the car farther from a store and walk.  If you can, do errands on foot  instead of driving.  Walk across the office to talk to someone in person instead of calling.  While waiting for appointments, go up and down stairs or around the block.   Tips to stay active  Here are some tips to follow:  Maintain your routine. But exercise less intensely if you feel tired.  Base your workout on how you feel, not your heart rate. Heart rates aren’t a good way to measure effort during pregnancy.  Avoid exercising on your back after week 16.   What are the warning signs that I should stop exercising?  Stop exercising and call your healthcare provider if you have any of these symptoms:  Vaginal bleeding   Dizziness or feeling faint   Increased shortness of breath   Chest pain   Headache   Muscle weakness   Calf (back of the leg) pain or swelling    Uterine contractions or pre-term labor   Decreased fetal movement   Fluid leaking (or gushing) from your vagina  [employer] last reviewed this educational content on 1/1/2018  © 3310-5426 The [employer]. [address] [place] [address]. All rights reserved. This information is not intended as a substitute for professional medical care. Always follow your healthcare professional's instructions.        Exercise During Pregnancy  Regular exercise can help you adapt to the changes your body is going through during pregnancy. Exercising may help you relax, and it gets you ready for labor and delivery. Talk to your healthcare provider about the kinds of activities you can do. Then go ahead and enjoy them.    Get started  Even if you didn’t exercise before pregnancy, it is not too late to start. Choose an activity that you like and that fits your lifestyle. Begin slowly and build up a little at a time. Be sure to check with your healthcare provider before starting any exercise program. The following tips may help you get started:  Choose a time and place to exercise each day.  Wear loose-fitting clothes and comfortable athletic shoes.  Stretch  before and after you exercise. (Be sure to stretch slowly and to hold stretches for 30 to 40 seconds.)  Be active  Unless your healthcare provider says otherwise, try to exercise for 30 minutes or more most days of the week:  Overall conditioning, like swimming, bicycling, or walking, is especially beneficial.  Aerobics and exercises that increase your pulse rate help condition your body and strengthen your heart. Ask about special prenatal aerobics classes.  Exercise safely  These tips will help you have a safe, healthy workout:  Stay cool. Stop exercising if you feel overheated.  Slow down if you’re out of breath. If you can’t talk during exercise, lower the intensity of the workout.  Monitor the intensity of your workout. Only do moderate-intensity (not strenuous) exercise.  Stay off your back. Lying on your back can decrease blood flow to your baby.  Drink water before, during and after your workout.  Eat 300 extra calories a day. A light snack before and after you exercise will help keep your energy up.  Avoid activities requiring balancing skills later in pregnancy.  Do Kegel exercises  Kegel exercises strengthen the pelvic floor muscles used in childbirth. These muscles are the same ones used to stop the flow of urine. Do Kegel exercises daily:  Squeeze your pelvic floor muscles for a count of 3.  Relax, then squeeze again.  Repeat 10 to 15 times in a row at least 3 times a day.  You can do Kegel exercises anytime and anywhere.  Keep walking  No matter what other exercise you do, try to walk whenever you can:  If you’re working all day, take a lunchtime walk in the park with a friend.  When you shop, park away from the store entrance and walk the extra distance.  Take the stairs instead of the elevator.     When to stop exercising and call your healthcare provider  Call your healthcare provider right away if you have:  Shortness of breath before starting exercise  Vaginal bleeding  Dizziness or feeling  faint  Chest pain  Headache  Decreased fetal movement   contractions   Muscle weakness  Calf pain or swelling  Fluid leaking from the vagina   Miyowa last reviewed this educational content on 2017 [employer]. [address] [place] [address], [place] [address]. All rights reserved. This information is not intended as a substitute for professional medical care. Always follow your healthcare professional's instructions.        Pregnancy: Your Weight     Your weight will be checked regularly by your healthcare provider.   Being a healthy weight is important for both you and your baby. The weight you gain now is not just extra fat. It is also the weight of your baby. And it is the increased blood and fluids to support the baby. A slow, steady rate of gain is best. How much you should gain depends on your weight before getting pregnant. Check with your healthcare provider to find out what is right for you.  Talk to your healthcare provider if you have any questions.   If you gain too much  Gaining too much weight might cause you to feel tired or you could have a harder pregnancy or birth. If you and your healthcare provider decide you’re gaining too much:  Eat fewer fats and sugars. Instead, eat fruit, vegetables, and whole-grain foods.  Drink plenty of water between meals.  Get at least 20 minutes of light exercise, like walking, each day.  Don’t diet. You might not get enough of the nutrients you or your baby needs.  Keep a diet diary to help you gauge what and how much you are eating.  If you’re not gaining enough  If you don’t gain enough, your baby could be too small or have health problems. Women tend to gain most of their weight in the second and third trimesters. For now:  Eat many types of foods. Make sure you get enough calcium, protein, and carbohydrates.  Don’t skip meals.  Eat healthy snacks.  Pick nutrient-dense, high-calorie healthy food like trail mix or protein  jose eduardo.  See a dietitian for help.  Talk to your healthcare provider if you have had an eating disorder or problems with certain foods.  The following are ways to get more calories:  Eat breakfast every day. Peanut butter or a slice of cheese on toast can give you an extra protein boost.  Snack between meals. Yogurt and dried fruits can provide protein, calcium, and minerals.  Try to eat more foods that are high in good fats, like nuts, fatty fish, avocados, and olive oil.  Drink juices made from real fruit that are high in vitamin C or beta carotene, like grapefruit juice, orange juice, papaya nectar, apricot nectar, and carrot juice.  Avoid junk food, like foods high in sugar.  Mosso last reviewed this educational content on 1/1/2018 © 2000-2020 The [employer]. [address] [place] [address]. All rights reserved. This information is not intended as a substitute for professional medical care. Always follow your healthcare professional's instructions.        Dental Care for Pregnant Women  Pregnancy is a time when your oral health needs more attention. Hormone changes during pregnancy can cause certain problems with teeth and gums, and make treatment more complicated.  How pregnancy affects oral health  During pregnancy, hormone changes can cause swollen, bleeding, and irritated gums (gingivitis). Your gums may be very sore, and brushing and flossing may cause discomfort. If left untreated, gingivitis can lead to a more serious gum disease called periodontitis. Severe periodontitis can lead to tooth loss.  Some pregnant women also have small bright-red growths on their gums that bleed easily. These are often called “pregnancy tumors.” They are not cancer. They usually go away right after birth. Talk with your dentist or healthcare provider if you have concerns.  Keeping a healthy mouth  Brush twice daily with fluoride toothpaste. Floss at least once a day.  If you have morning sickness,  rinse your mouth with a teaspoon of baking soda mixed with water after vomiting. Do not brush your teeth right after vomiting. This can remove tooth enamel.  See your dentist for cleanings and checkups more often if needed. This is especially true in your second and third trimesters.  Ask your dentist or healthcare provider if you should use a special mouth rinse to help prevent gingivitis.  Tell your dentist or healthcare provider about any changes in your mouth, such as soreness or bleeding.  Safety concerns  Make sure to tell your dentist that you’re pregnant. He or she can help you stay safe. If you need to have dental X-rays during pregnancy, he or she will make sure you are fully protected. You will wear a lead apron over your belly during the X-ray process. The apron helps block radiation from the X-rays.  If you need to take medicines like antibiotics or pain relievers for dental problems, talk with your healthcare providers first. Your providers will discuss the risks and benefits of taking these during pregnancy.  If you have a high-risk pregnancy, your dentist and your healthcare provider may advise that certain dental treatments wait until after you give birth.  StayWell last reviewed this educational content on 12/1/2017 © 2000-2020 The [employer]. [address] [place] [address], [place] [address]. All rights reserved. This information is not intended as a substitute for professional medical care. Always follow your healthcare professional's instructions.       Pregnancy: Your First Trimester Changes  The first trimester is a time of rapid development for your baby. Because your baby is growing so quickly, it is important that you start a healthy lifestyle right away. By the end of the first trimester, your baby has formed all of its major body organs and weighs just over an ounce.     Actual size of baby is 1/4\"    Month 1 (weeks 1 to 4)  The placenta (the organ that nourishes your baby) begins to  form. The brain, spinal cord, heart, gastrointestinal tract, and lungs begin to develop. Your baby is about 1/4-inch long by the end of the first month.     Actual size of baby is 1\"      Month 2 (weeks 5 to 8)  All of your baby’s major body organs form. The face, fingers, toes, ears, and eyes appear. By the end of the month, your baby is about 1-inch long.     Actual size of baby is 3\"      Month 3 (weeks 9 to 12)  Your baby can open and close its fists and mouth. The sexual organs begin to form. As the first trimester ends, your baby is about 3-inches long.  StayWell last reviewed this educational content on 10/1/2017  © 6044-1917 The [employer]. [address] [place] [address], [place] [address]. All rights reserved. This information is not intended as a substitute for professional medical care. Always follow your healthcare professional's instructions.        Adapting to Pregnancy: First Trimester    As your body adjusts, you may have to change or limit your daily activities. You’ll need more rest. You may also need to use the energy you have more wisely.  Your changing body  Almost every part of your body is affected as you adapt to pregnancy. The uterus and cervix will begin to soften right away. You may not look very pregnant during the first 3 months. But you are likely to have some common signs of early pregnancy:  Nausea  Fatigue  Frequent urination  Mood swings  Bloating of the belly  Constipation  Heartburn  Missed or light periods (first trimester bleeding)  Nipple or breast tenderness and breast swelling  It’s not too late to start good habits  What matters most is protecting your baby from this moment on. If you smoke, drink alcohol, or use drugs, now is the time to stop. If you need help, talk with your healthcare provider:  Smoking increases the risk of stillbirth or having a low-birth-weight baby. If you smoke, quit now.  Alcohol and drugs have been linked with miscarriage, birth defects,  intellectual disability, and low birth weight. Do not drink alcohol or take drugs.  Tips to relieve nausea  Although nausea can happen at any time of the day, it may be worse in the morning. To help prevent nausea:  Eat small, light meals at frequent intervals.  Drink fluids often.  Get up slowly. Eat a few unsalted crackers before you get out of bed.  Avoid smells that bother you.  Avoid spicy and fatty foods.  Eat an ice pop in your favorite flavor.  Get plenty of rest.  Ask your healthcare provider about taking lashawn or vitamin B6 for nausea and vomiting.  Talk with your healthcare provider if you take vitamins that upset your stomach.  Work concerns  The end of the first trimester is a good time to discuss working during pregnancy with your employer. Follow your healthcare provider’s advice if your job needs you to stand for a long time, work with hazardous tools, or even sit at a desk all day. Your workspace, workload, or scheduled hours may need to be adjusted. Perhaps you can change body postures more often or take an extra break.  Advice for travel  Talk to your healthcare provider first, but the second trimester may be the best time for any travel. You may be advised to avoid certain trips while you’re pregnant. Food and water can be concerns in developing countries. Travel by car is a good choice, as you can stop, get out, and stretch. Bring snacks and water along. Fasten the lap belt below your belly, low over your hips. Also be sure to wear the shoulder harness.  Intimacy  Unless your healthcare provider tells you to, there is no reason to stop having sex while you’re pregnant. You or your partner may notice changes in desire. Desire may be less in the first trimester, due to nausea and fatigue. In the second trimester, sex may be very enjoyable. The third trimester can be a challenge comfort-wise. Try different positions and see what’s best for you both.  StayWell last reviewed this educational content  on 10/1/2017  © 3432-8007 [employer]. [address] [place] [address], [place] [address]. All rights reserved. This information is not intended as a substitute for professional medical care. Always follow your healthcare professional's instructions.        Comfort Tips During Pregnancy    Talk with your healthcare provider before using pain-relieving medicine at any time during your pregnancy.  First trimester tips  Nausea  Get up slowly. Eat a few unsalted crackers before you get out of bed.  Avoid smells that bother you.  Eat small bland low fat, light high-protein meals at frequent intervals.  Sip on water, weak tea, or clear soft drinks, like ginger ale. Eat ice chips.  Fatigue  Take catnaps when you can.  Get regular exercise.  Accept help from others.  Practice good sleep habits, like going to bed and getting up at the same time each day. Use your bed only for sleep and sex.  Mood swings  Talk about your feelings with others, including other mothers.  Limit sugar, chocolate, and caffeine.  Eat a healthy diet. Don’t skip meals.  Get regular exercise.  Headaches  Get fresh air and exercise.  Relax and get enough rest.  Check with your healthcare provider before taking any pain medicines.  Second trimester tips  Here are some suggestions to help you cope:  To limit ankle swelling, sit with your feet raised or wear support hose.  If you have pain in your groin and stomach (round ligament pain), avoid sudden twisting movements.  For leg cramps, flexing your foot often brings immediate relief. You also may try massaging your calf in long, downward strokes, or stretching your legs before going to bed. Get enough exercise and wear shoes with flexible soles.  Third trimester tips  Reducing heartburn  Eat small, light meals throughout the day rather than 3 large ones.  Sleep with your upper body raised 6 inches. Don’t lie down until 2 hours after you eat.  Don't eat greasy, fried, or spicy foods.  Avoid citrus  fruits and juices.  Treating constipation  Eat foods high in fiber (whole-grain foods, fresh fruit and vegetables).  Drink plenty of water.  Get regular exercise.  Discuss other medicines (like docusate and psyllium) with your healthcare provider.  Taking care of your breasts  Avoid using harsh soaps or alcohol, which can cause excessive dryness.  Wear nursing bras. They provide more support than regular bras and can be used after pregnancy if you breastfeed.  Getting a good night’s sleep  Take a warm shower before bed.  Sleep on a firm mattress.  Lie on your side with 1 leg crossed over the other.  Use pillows to support arms, legs, and belly.  [employer] last reviewed this educational content on 10/1/2017  © 3401-8238 [employer], [employer]. [address] [place] [address], [place] [address]. All rights reserved. This information is not intended as a substitute for professional medical care. Always follow your healthcare professional's instructions.        Bleeding During Early Pregnancy     Ultrasound can help check the health of your fetus.     If you’ve had bleeding early in your pregnancy, you’re not alone. Many other pregnant women have had early bleeding, too. And in most cases, nothing is wrong. But your healthcare provider still needs to know about it. He or she may want to do tests to find out why you’re bleeding. Call your healthcare provider if you notice bleeding during pregnancy.  What causes early bleeding?  The cause of bleeding early in pregnancy is often unknown. But many factors early on in pregnancy may lead to bleeding or spotting. These include sexual intercourse, which may cause bleeding in any trimester. Here are some other causes:  Implantation of the embryo on the uterine wall  Subchorionic hemorrhage (bleeding between the sac membrane and the uterus)  Miscarriage  Ectopic (tubal) pregnancy  If you notice spotting  Spotting (very light bleeding) is the most common type of bleeding in early  pregnancy. If you notice it, call your healthcare provider. Chances are, he or she will tell you that you can care for yourself at home.  If tests are needed  Depending on how much you bleed, your healthcare provider may ask you to come in for some tests. A pelvic exam, for instance, can help see how far along your pregnancy is. You also may have an ultrasound or a Doppler test. These imaging tests use sound waves to check the health of your fetus. The ultrasound may be done on your belly or inside your vagina. Your healthcare provider also may order a special blood test. This test compares your hormone levels in blood samples taken 2 days apart. The results can help your healthcare provider learn more about the implantation of the embryo. Your blood type will also need to be checked to evaluate whether you will need to be treated for Rh sensitization.   Warning signs  If your bleeding doesn’t stop or if you notice any of the following, seek medical help right away:  Soaking a sanitary pad each hour  Bleeding like you’re having a period  Cramping or severe belly pain  Feeling dizzy or faint  Tissue passing through your vagina  Bleeding at any time after the first trimester  Questions you may be asked  Though not normal, bleeding early in pregnancy is common. If you’ve noticed any bleeding, you may be concerned. But keep in mind that bleeding alone doesn’t mean something is wrong. Call your healthcare provider right away, though. He or she may ask you questions like these to help find the cause of your bleeding:  When did your bleeding start?  Is your bleeding very light (spotting) or is it like a period?  Is the blood bright red or brownish?  Have you had sexual intercourse recently?  Have you had pain or cramping?  Have you felt dizzy or faint?  Monitoring your pregnancy  Bleeding will often stop as quickly as it began. Your pregnancy may go on a normal path again. You may need to make a few extra prenatal visits.  But you and your baby will most likely be fine.  [employer] last reviewed this educational content on 6/1/2016  © 3885-3245 [employer], [employer]. [address] [place] [address], [place] [address]. All rights reserved. This information is not intended as a substitute for professional medical care. Always follow your healthcare professional's instructions.        Abdominal Pain and Early Pregnancy    The tests you had show that you are pregnant, but the exact cause of your pain isn’t clear.   Some pain and bleeding are common early in pregnancy. Often they stop, and you can go on to have a normal pregnancy and baby. Other times the pain or bleeding can be signs of a miscarriage or ectopic pregnancy. An ectopic pregnancy is a very serious problem. At this time it is unclear if your pregnancy will continue normally, if you will have a miscarriage, or if you could have an ectopic pregnancy. Below is some information about this.   Miscarriage  At this time we don’t know whether you will have a miscarriage, or if things will clear up and your pregnancy will continue normally. We understand that this is emotionally difficult. There is little we can say to change the way you feel. But understand that miscarriages are common.   About 1 or 2 out of every 10 pregnancies end this way. Some end even before you know you are pregnant. This happens for a number of reasons, and usually we never figure out why. It’s important you know that it is not your fault. It didn’t happen because you did anything wrong.   Having sex or exercising does not cause a miscarriage. These activities are usually safe unless you have pain or bleeding or your healthcare provider tells you to stop. Even minor falls won’t cause a miscarriage. Miscarriages happen because things were not developing as they were supposed to. No medicine can prevent a miscarriage.   Ectopic pregnancy  In a normal pregnancy, the fertilized egg attaches to the wall of the womb  (uterus). In an ectopic or tubal pregnancy, the fertilized egg attaches outside the uterus, usually in the fallopian tube. Very rarely, the egg attaches to an ovary or somewhere else in the abdomen. An ectopic pregnancy is much less common than a miscarriage, but it is very serious. The baby can't survive, and as it grows it can rupture the tube. This can cause internal bleeding and even death. Risk factors for an ectopic are:   An ectopic pregnancy in the past  Pelvic inflammatory disease (PID)  Endometriosis  Smoking  An IUD  Additional tests  Because we don’t know what’s causing your symptoms, you will need more tests to figure out what the problem is. You may need the following.   Ultrasound  An ultrasound can usually find a normal pregnancy as early as 4 to 5 weeks along. If the ultrasound does not show the baby inside the uterus, it means one of the following.   You have a normal pregnancy less than 4 weeks along  You are having or recently had a miscarriage  You have an ectopic pregnancy  Quantitative HCG  This test measures the amount of a pregnancy hormone in your blood. Comparing today's test result to a repeat test in 2 days will show whether you have a normal pregnancy.   Laparoscopy  This is a type of surgery. The healthcare provider will put a tube with a light inside your belly (abdomen) to look directly at your pelvic organs. This test is used when it is not safe to wait 2 days for blood test results.   Important information  If you do have an ectopic pregnancy, there is a small chance that the growing fetus can tear the fallopian tube. This can cause severe internal bleeding. If this happens, you may have:   Sudden severe pain in your lower abdomen  Vaginal bleeding  Weakness, dizziness, and sometimes fainting  If any of these symptoms occur:  Call 911or return right away to the hospital.  Don't drive yourself.  Don't go to your healthcare provider's office or to a clinic. Go to the hospital.  Home  care  Follow these guidelines to help care for yourself at home:   Rest until your next exam. Don’t do anything strenuous.  Eat a light diet with foods that are easy to digest.  Don’t have sex until your healthcare provider says it’s OK.  Follow-up care  Follow up with your healthcare provider, or as advised. If you were told to have a repeat blood test in 2 days, it’s important to get it done.   If you had an X-ray or ultrasound, a radiologist will review it. You will be told of any new findings that may affect your care.   Call 911  Call 911 if you have any of these:   Severe pain and very heavy bleeding  Severe lightheadedness, passing out, or fainting  Rapid heart rate  Trouble breathing  Confused or difficulty waking up  When to seek medical advice  Call your healthcare provider right away if any of these occur:   The pain in your abdomen gets worse, either suddenly or gradually.  You are dizzy or weak when you stand.  You have heavy vaginal bleeding. This means soaking 1 pad an hour for 3 hours.  You have vaginal bleeding for more than 5 days.  You have repeated vomiting or diarrhea.  The pain in your abdomen moves to the lower right.  You have blood in your vomit or bowel movements. This will be dark red or black.  You have a fever of 100.4ºF (38ºC) or higher, or as directed by your healthcare provider.  [employer] last reviewed this educational content on 9/1/2019 © 2000-2020 [employer]. [address] [place] [address], [place] [address]. All rights reserved. This information is not intended as a substitute for professional medical care. Always follow your healthcare professional's instructions.

## 2024-02-02 NOTE — PROGRESS NOTES
CHIEF COMPLAINT:  Chief Complaint   Patient presents with    Gyn Exam     Missed mense LMP 12/10/2023   Had an encounter for early bleeding in pregnancy but patient states bleeding has stopped         HPI:  Nanette is 28 year old, female, here today for follow up on vaginal spotting in early pregnancy and to initiate prenatal care with the midwives. Pt of the midwives with prior pregnancy. Was seen by her PCP on 24 with vaginal spotting. Denies further spotting since and has not had any cramping or pain. This was a planned pregnancy and she is taking prenatal vitamins.    Reports that PCP manages her hypothyroid and recently had labs drawn.      Patient's last menstrual period was 2023 (within days).        2022, term , girl, 3460g, denies complications  Current, as above  LMP 12/10/23 --> 7w5 --> NEPTALI 9/15/24    HISTORY:  Past Medical History:   Diagnosis Date    Thyroid disease     2 years ago on meds      Past Surgical History:   Procedure Laterality Date    WISDOM TEETH REMOVED        Family History   Problem Relation Age of Onset    Hypertension Father     Hypertension Paternal Grandmother     Other (Other) Paternal Grandmother         kidney stones    Hypertension Paternal Grandfather       Social History:   Social History     Socioeconomic History    Marital status:      Spouse name: justin Leone    Number of children: 0    Highest education level: Bachelor's degree (e.g., BA, AB, BS)   Tobacco Use    Smoking status: Never    Smokeless tobacco: Never   Vaping Use    Vaping Use: Never used   Substance and Sexual Activity    Alcohol use: Not Currently    Drug use: Never   Other Topics Concern    Blood Transfusions No    Caffeine Concern No    Occupational Exposure No    Stress Concern No    Special Diet No    Exercise No    Bike Helmet No    Seat Belt Yes          Medications (Active prior to today's visit):  Current Outpatient Medications   Medication Sig Dispense Refill     levothyroxine 25 MCG Oral Tab Take 1 tablet (25 mcg total) by mouth before breakfast. 90 tablet 3       Allergies:  No Known Allergies    REVIEW OF SYSTEMS:  Review of Systems   Constitutional: Negative.    Genitourinary:  Negative for pelvic pain (no pain or cramping) and vaginal bleeding (no current bleeding).        PHYSICAL EXAM:  Vitals:    02/02/24 1126   BP: 109/70   Pulse: 76     Physical Exam  Vitals reviewed.   Constitutional:       Appearance: Normal appearance.   HENT:      Head: Normocephalic.   Pulmonary:      Effort: Pulmonary effort is normal.   Neurological:      Mental Status: She is alert and oriented to person, place, and time.   Psychiatric:         Behavior: Behavior normal.         Thought Content: Thought content normal.         Judgment: Judgment normal.            1/31/24: [facility] 10,777  1/31/24: TSH WNL  O positive  No pap on record    1/31/24 pelvic ultrasound:  CONCLUSION:      1. Gestational sac measuring 9 mm with a 3 mm yolk sac.  No fetal pole is identified.  Recommend follow-up pelvic ultrasound in 10 days as well as serial beta HCGs.   2. A 2.2 cm subchorionic hemorrhage that abuts the gestational sac.   3. A 2.3 cm left corpus luteal cyst.   4. Small volume of complex free fluid in the pelvis.      ASSESSMENT/PLAN:   Nanette was seen today for gyn exam.    Diagnoses and all orders for this visit:    7 weeks gestation of pregnancy    Pregnancy with uncertain fetal viability, single or unspecified fetus  -  Repeating [facility] today  -     US PREG 1ST TRIMESTER (CPT=76801); Future - pt to schedule for next Wednesday or Thursday. To call our office if unable to schedule  -     Urine Culture, Routine; Future  -     Vaginitis Vaginosis PCR Panel; Future  -     Vaginitis Vaginosis PCR Panel  -     Urine Culture, Routine    Spotting in early pregnancy  -     Urine Culture, Routine; Future  -     Vaginitis Vaginosis PCR Panel; Future  -     Chlamydia/Gc Amplification; Future  -     Chlamydia/Gc  Amplification  -     Vaginitis Vaginosis PCR Panel  -     Urine Culture, Routine    Screening for STD (sexually transmitted disease)  -     Chlamydia/Gc Amplification; Future  -     Chlamydia/Gc Amplification    Pap smear for cervical cancer screening  -     ThinPrep PAP with HPV Reflex Request B; Future  -     ThinPrep PAP with HPV Reflex Request B     Today's UCG positive result reviewed with patient  Appropriate for CNM care   Already prenatal vitamins    Next visit: Patient to schedule Nurse Education visit, next available, and NOB for 12wga    Counseling included:  -- Warning signs reviewed. Advised to call office if occur    Pt verbalized understanding. All questions answered. No barriers to learning identified

## 2024-02-03 LAB
BV BACTERIA DNA VAG QL NAA+PROBE: NEGATIVE
C GLABRATA DNA VAG QL NAA+PROBE: NEGATIVE
C KRUSEI DNA VAG QL NAA+PROBE: NEGATIVE
CANDIDA DNA VAG QL NAA+PROBE: NEGATIVE
T VAGINALIS DNA VAG QL NAA+PROBE: NEGATIVE

## 2024-02-05 LAB
C TRACH DNA SPEC QL NAA+PROBE: NEGATIVE
N GONORRHOEA DNA SPEC QL NAA+PROBE: NEGATIVE

## 2024-02-07 ENCOUNTER — HOSPITAL ENCOUNTER (OUTPATIENT)
Dept: ULTRASOUND IMAGING | Facility: HOSPITAL | Age: 29
Discharge: HOME | End: 2024-02-07
Attending: ADVANCED PRACTICE MIDWIFE
Payer: COMMERCIAL

## 2024-02-07 DIAGNOSIS — O36.80X0 PREGNANCY WITH UNCERTAIN FETAL VIABILITY, SINGLE OR UNSPECIFIED FETUS: ICD-10-CM

## 2024-02-07 PROCEDURE — 76817 TRANSVAGINAL US OBSTETRIC: CPT | Performed by: ADVANCED PRACTICE MIDWIFE

## 2024-02-07 PROCEDURE — 76801 OB US < 14 WKS SINGLE FETUS: CPT | Performed by: ADVANCED PRACTICE MIDWIFE

## 2024-03-15 ENCOUNTER — NURSE ONLY (OUTPATIENT)
Dept: OBGYN CLINIC | Facility: CLINIC | Age: 29
End: 2024-03-15
Payer: COMMERCIAL

## 2024-03-15 DIAGNOSIS — Z34.81 ENCOUNTER FOR SUPERVISION OF OTHER NORMAL PREGNANCY IN FIRST TRIMESTER (HCC): Primary | ICD-10-CM

## 2024-03-15 RX ORDER — CHOLECALCIFEROL (VITAMIN D3) 25 MCG
1 TABLET,CHEWABLE ORAL DAILY
COMMUNITY

## 2024-03-18 ENCOUNTER — INITIAL PRENATAL (OUTPATIENT)
Dept: OBGYN CLINIC | Facility: CLINIC | Age: 29
End: 2024-03-18

## 2024-03-18 ENCOUNTER — LAB ENCOUNTER (OUTPATIENT)
Dept: LAB | Facility: HOSPITAL | Age: 29
End: 2024-03-18
Attending: ADVANCED PRACTICE MIDWIFE
Payer: COMMERCIAL

## 2024-03-18 VITALS
SYSTOLIC BLOOD PRESSURE: 99 MMHG | BODY MASS INDEX: 20 KG/M2 | DIASTOLIC BLOOD PRESSURE: 66 MMHG | HEART RATE: 79 BPM | WEIGHT: 105 LBS

## 2024-03-18 DIAGNOSIS — Z34.81 ENCOUNTER FOR SUPERVISION OF OTHER NORMAL PREGNANCY IN FIRST TRIMESTER (HCC): Primary | ICD-10-CM

## 2024-03-18 DIAGNOSIS — Z34.81 ENCOUNTER FOR SUPERVISION OF OTHER NORMAL PREGNANCY IN FIRST TRIMESTER (HCC): ICD-10-CM

## 2024-03-18 LAB
ANTIBODY SCREEN: NEGATIVE
BASOPHILS # BLD AUTO: 0.02 X10(3) UL (ref 0–0.2)
BASOPHILS NFR BLD AUTO: 0.3 %
DEPRECATED RDW RBC AUTO: 40.7 FL (ref 35.1–46.3)
EOSINOPHIL # BLD AUTO: 0.05 X10(3) UL (ref 0–0.7)
EOSINOPHIL NFR BLD AUTO: 0.7 %
ERYTHROCYTE [DISTWIDTH] IN BLOOD BY AUTOMATED COUNT: 12 % (ref 11–15)
EST. AVERAGE GLUCOSE BLD GHB EST-MCNC: 94 MG/DL (ref 68–126)
HBA1C MFR BLD: 4.9 % (ref ?–5.7)
HBV SURFACE AG SER-ACNC: 0.13 [IU]/L
HBV SURFACE AG SERPL QL IA: NONREACTIVE
HCT VFR BLD AUTO: 35.2 %
HCV AB SERPL QL IA: NONREACTIVE
HGB BLD-MCNC: 11.6 G/DL
IMM GRANULOCYTES # BLD AUTO: 0.03 X10(3) UL (ref 0–1)
IMM GRANULOCYTES NFR BLD: 0.4 %
LYMPHOCYTES # BLD AUTO: 1.69 X10(3) UL (ref 1–4)
LYMPHOCYTES NFR BLD AUTO: 22.8 %
MCH RBC QN AUTO: 30.4 PG (ref 26–34)
MCHC RBC AUTO-ENTMCNC: 33 G/DL (ref 31–37)
MCV RBC AUTO: 92.4 FL
MONOCYTES # BLD AUTO: 0.5 X10(3) UL (ref 0.1–1)
MONOCYTES NFR BLD AUTO: 6.7 %
NEUTROPHILS # BLD AUTO: 5.13 X10 (3) UL (ref 1.5–7.7)
NEUTROPHILS # BLD AUTO: 5.13 X10(3) UL (ref 1.5–7.7)
NEUTROPHILS NFR BLD AUTO: 69.1 %
PLATELET # BLD AUTO: 226 10(3)UL (ref 150–450)
RBC # BLD AUTO: 3.81 X10(6)UL
RH BLOOD TYPE: POSITIVE
RUBV IGG SER-ACNC: 6.8 IU/ML (ref 10–?)
T PALLIDUM AB SER QL IA: NONREACTIVE
TSI SER-ACNC: 2.16 MIU/ML (ref 0.55–4.78)
VZV IGG SER IA-ACNC: 67.8 (ref 165–?)
WBC # BLD AUTO: 7.4 X10(3) UL (ref 4–11)

## 2024-03-18 PROCEDURE — 85025 COMPLETE CBC W/AUTO DIFF WBC: CPT

## 2024-03-18 PROCEDURE — 86787 VARICELLA-ZOSTER ANTIBODY: CPT

## 2024-03-18 PROCEDURE — 87340 HEPATITIS B SURFACE AG IA: CPT

## 2024-03-18 PROCEDURE — 36415 COLL VENOUS BLD VENIPUNCTURE: CPT

## 2024-03-18 PROCEDURE — 86803 HEPATITIS C AB TEST: CPT

## 2024-03-18 PROCEDURE — 87086 URINE CULTURE/COLONY COUNT: CPT

## 2024-03-18 PROCEDURE — 86850 RBC ANTIBODY SCREEN: CPT

## 2024-03-18 PROCEDURE — 86762 RUBELLA ANTIBODY: CPT

## 2024-03-18 PROCEDURE — 83036 HEMOGLOBIN GLYCOSYLATED A1C: CPT

## 2024-03-18 PROCEDURE — 84443 ASSAY THYROID STIM HORMONE: CPT

## 2024-03-18 PROCEDURE — 87389 HIV-1 AG W/HIV-1&-2 AB AG IA: CPT

## 2024-03-18 PROCEDURE — 86900 BLOOD TYPING SEROLOGIC ABO: CPT

## 2024-03-18 PROCEDURE — 86901 BLOOD TYPING SEROLOGIC RH(D): CPT

## 2024-03-18 PROCEDURE — 83021 HEMOGLOBIN CHROMOTOGRAPHY: CPT

## 2024-03-18 PROCEDURE — 83020 HEMOGLOBIN ELECTROPHORESIS: CPT

## 2024-03-18 PROCEDURE — 86780 TREPONEMA PALLIDUM: CPT

## 2024-03-18 NOTE — PROGRESS NOTES
Nausea but no vomiting. Discussed switching vitamin / timing. Did labs just prior to visit so not available yet. Doing cell free DNA kit picked up today. Previous vaginal delivery with TM. Follow-up in 4 weeks.

## 2024-03-20 LAB
HGB A2 MFR BLD: 2.6 % (ref 1.5–3.5)
HGB PNL BLD ELPH: 97.4 % (ref 95.5–100)

## 2024-03-21 ENCOUNTER — TELEPHONE (OUTPATIENT)
Dept: OBGYN CLINIC | Facility: CLINIC | Age: 29
End: 2024-03-21

## 2024-03-21 PROBLEM — Z28.39 RUBELLA NON-IMMUNE STATUS, ANTEPARTUM (HCC): Status: ACTIVE | Noted: 2024-03-21

## 2024-03-21 PROBLEM — O09.899 RUBELLA NON-IMMUNE STATUS, ANTEPARTUM (HCC): Status: ACTIVE | Noted: 2024-03-21

## 2024-03-21 PROBLEM — Z28.39 SUSCEPTIBLE TO VARICELLA (NON-IMMUNE), CURRENTLY PREGNANT (HCC): Status: ACTIVE | Noted: 2024-03-21

## 2024-03-21 PROBLEM — O09.899 SUSCEPTIBLE TO VARICELLA (NON-IMMUNE), CURRENTLY PREGNANT (HCC): Status: ACTIVE | Noted: 2024-03-21

## 2024-03-21 NOTE — TELEPHONE ENCOUNTER
----- Message from Concepcion Carroll CNM sent at 3/21/2024  1:24 PM CDT -----  Chicken pox non immune rubella non immune please give precautions can be vaccinated PP all other normal for pregnancy

## 2024-03-21 NOTE — TELEPHONE ENCOUNTER
Name and  verified    Patient given MES results and verbalized understanding. Precautions reviewed.

## 2024-03-25 ENCOUNTER — TELEPHONE (OUTPATIENT)
Dept: OBGYN CLINIC | Facility: CLINIC | Age: 29
End: 2024-03-25

## 2024-03-25 NOTE — TELEPHONE ENCOUNTER
Incoming Fax received from [employer] with patient's Panorama test results.    Sample collection date: 3/18/24  Report date: 3/23/24    Results: Low Risk   Low Risk for Aneuploidies and Microdeletions  Fetal Sex: Male  Fetal Fraction:  12.5%    Placed in [facility] mailbox for review.

## 2024-04-15 ENCOUNTER — ROUTINE PRENATAL (OUTPATIENT)
Dept: OBGYN CLINIC | Facility: CLINIC | Age: 29
End: 2024-04-15

## 2024-04-15 VITALS
BODY MASS INDEX: 21 KG/M2 | WEIGHT: 110 LBS | SYSTOLIC BLOOD PRESSURE: 103 MMHG | HEART RATE: 102 BPM | DIASTOLIC BLOOD PRESSURE: 67 MMHG

## 2024-04-15 DIAGNOSIS — Z34.82 ENCOUNTER FOR SUPERVISION OF OTHER NORMAL PREGNANCY IN SECOND TRIMESTER (HCC): Primary | ICD-10-CM

## 2024-04-15 DIAGNOSIS — O09.899 RUBELLA NON-IMMUNE STATUS, ANTEPARTUM (HCC): ICD-10-CM

## 2024-04-15 DIAGNOSIS — Z28.39 RUBELLA NON-IMMUNE STATUS, ANTEPARTUM (HCC): ICD-10-CM

## 2024-04-15 DIAGNOSIS — E03.9 ACQUIRED HYPOTHYROIDISM: ICD-10-CM

## 2024-04-15 NOTE — PROGRESS NOTES
Patient presents for routine OB visit. Overall, feeling well. Denies loss of fluid, vaginal bleeding, and contractions.    Experiencing more urinary frequency and pressure. POCT urinalysis negative. Reviewed effects of pregnancy on bladder. Encouraged adequate hydration.    NOB labs: Rubella Non-immune    Hypothyroid: Second trimester TSH and free T4 ordered    Anatomy scan with MFM ordered. Scheduling instructions given to patient    Reviewed warning signs    RTC 4 weeks    Assessment and Plan completed by LEANN Gonzales under the direct supervision of Yael Hrenadez CNM

## 2024-05-23 ENCOUNTER — HOSPITAL ENCOUNTER (OUTPATIENT)
Dept: PERINATAL CARE | Facility: HOSPITAL | Age: 29
Discharge: HOME | End: 2024-05-23
Attending: OBSTETRICS & GYNECOLOGY

## 2024-05-23 ENCOUNTER — ROUTINE PRENATAL (OUTPATIENT)
Dept: OBGYN CLINIC | Facility: CLINIC | Age: 29
End: 2024-05-23

## 2024-05-23 ENCOUNTER — LAB ENCOUNTER (OUTPATIENT)
Dept: LAB | Facility: HOSPITAL | Age: 29
End: 2024-05-23
Attending: ADVANCED PRACTICE MIDWIFE

## 2024-05-23 ENCOUNTER — HOSPITAL ENCOUNTER (OUTPATIENT)
Dept: PERINATAL CARE | Facility: HOSPITAL | Age: 29
Discharge: HOME | End: 2024-05-23
Attending: ADVANCED PRACTICE MIDWIFE

## 2024-05-23 VITALS
HEART RATE: 77 BPM | DIASTOLIC BLOOD PRESSURE: 58 MMHG | WEIGHT: 114 LBS | SYSTOLIC BLOOD PRESSURE: 93 MMHG | BODY MASS INDEX: 22 KG/M2

## 2024-05-23 VITALS
HEART RATE: 74 BPM | SYSTOLIC BLOOD PRESSURE: 97 MMHG | BODY MASS INDEX: 22 KG/M2 | DIASTOLIC BLOOD PRESSURE: 59 MMHG | WEIGHT: 114 LBS

## 2024-05-23 DIAGNOSIS — Z36.89 ENCOUNTER FOR FETAL ANATOMIC SURVEY (HCC): ICD-10-CM

## 2024-05-23 DIAGNOSIS — E03.9 ACQUIRED HYPOTHYROIDISM: ICD-10-CM

## 2024-05-23 DIAGNOSIS — Z34.92 PRENATAL CARE, SECOND TRIMESTER (HCC): Primary | ICD-10-CM

## 2024-05-23 DIAGNOSIS — Z36.89 ENCOUNTER FOR FETAL ANATOMIC SURVEY (HCC): Primary | ICD-10-CM

## 2024-05-23 LAB
T4 FREE SERPL-MCNC: 1 NG/DL (ref 0.8–1.7)
TSI SER-ACNC: 1.68 MIU/ML (ref 0.55–4.78)

## 2024-05-23 PROCEDURE — 84439 ASSAY OF FREE THYROXINE: CPT

## 2024-05-23 PROCEDURE — 76811 OB US DETAILED SNGL FETUS: CPT | Performed by: OBSTETRICS & GYNECOLOGY

## 2024-05-23 PROCEDURE — 36415 COLL VENOUS BLD VENIPUNCTURE: CPT

## 2024-05-23 PROCEDURE — 84443 ASSAY THYROID STIM HORMONE: CPT

## 2024-05-23 NOTE — PROGRESS NOTES
Feeling well. Endorses regular fetal movement. Denies contractions, LOF, vaginal bleeding.     Has anatomy scan after appointment.    Plan:  TAHIRA 4 weeks    Reviewed second trimester warning signs and when to call.

## 2024-05-23 NOTE — PROGRESS NOTES
Outpatient Maternal-Fetal Medicine Consultation    Dear Ms. Hernadez,    Thank you for requesting ultrasound evaluation and maternal fetal medicine consultation on your patient Nanette Wayne.  As you are aware she is a 29 year old female with a Augustin pregnancy at 21w5d.  A maternal-fetal medicine consultation was requested secondary to hypothyroidism.  Her prenatal records and labs were reviewed.    She reports that she did not have any complications in her first pregnancy.  She did not have any  labor, diabetes, elevated blood pressures or fetal growth concerns.  Levothyroxine dose was stable throughout her last pregnancy and postpartum.    HISTORY  OB History    Para Term  AB Living   2 1 1 0 0 1   SAB IAB Ectopic Multiple Live Births   0 0 0 0 1     # 1 - Date: 22, Sex: Female, Weight: 7 lb 10.1 oz (3.46 kg), GA: 38w3d, Type: Normal spontaneous vaginal delivery, Apgar1: 9, Apgar5: 9, Living: Living, Birth Comments: None    # 2 - Date: None, Sex: None, Weight: None, GA: None, Type: None, Apgar1: None, Apgar5: None, Living: None, Birth Comments: None    Past Medical History  The patient  has a past medical history of Thyroid disease.    She has no past medical history of Abnormal uterine bleeding, Amenorrhea, Anemia, Anxiety, Blood disorder, Cancer ([facility]), Chlamydia, Decorative tattoo, Depression, Diabetes mellitus ([facility]), Dysmenorrhea, Endometriosis, Exposure to genital herpes, Fibroids, Genital herpes simplex, Genital warts, Gonorrhea, Heart murmur, Hepatitis B virus infection, HIV infection ([facility]), Hormone disorder, Human papilloma virus infection, Hyperlipidemia, Hypertension, Infertility, female, Osteoporosis, PID (pelvic inflammatory disease), Sexually transmitted disease, Substance abuse ([facility]), Syphilis, Transfusion history, Tuberculosis contact, Urinary incontinence, Viral hepatitis C, or Viral infection characterized by skin and mucous membrane lesions.    Past Surgical  History  The patient  has a past surgical history that includes wisdom teeth removed (2021).    Family History  The patient She indicated that her mother is alive. She indicated that her father is alive. She indicated that her maternal grandmother is alive. She indicated that her maternal grandfather is alive. She indicated that her paternal grandmother is alive. She indicated that her paternal grandfather is alive.      Medications:   Current Outpatient Medications:     prenatal vitamin with DHA 27-0.8-228 MG Oral Cap, Take 1 capsule by mouth daily., Disp: , Rfl:     levothyroxine 25 MCG Oral Tab, Take 1 tablet (25 mcg total) by mouth before breakfast., Disp: 90 tablet, Rfl: 3  Allergies: No Known Allergies      PHYSICAL EXAMINATION:  BP 97/59   Pulse 74   Wt 114 lb (51.7 kg)   LMP 12/12/2023 (Within Days)   BMI 21.54 kg/m²   General: alert and oriented,no acute distress  Abdomen: gravid, soft, non-tender  Extremities: non-tender, no edema      OBSTETRIC ULTRASOUND  The patient had a level 2 ultrasound today which I interpreted the results and reviewed them with the patient.    Ultrasound Findings:  Single IUP in breech presentation.    Placenta is anterior.   A 3 vessel cord is noted.  Cardiac activity is present at 151 bpm   g ( 1 lb 1 oz);   MVP is 5.4 cm .     The fetal measurements are consistent with the established EDC. No ultrasound evidence of structural abnormalities are seen today. The nasal bone is present. No ultrasound evidence of markers for aneuploidy are seen. She understands that ultrasound exam cannot exclude genetic abnormalities and that genetic testing is recommended. The limitations of ultrasound were discussed.     Uterus and adnexa appeared normal  today on US    See imaging tab for the complete US report.    DISCUSSION  During her visit we discussed and reviewed the following issues:  Hypothyroidism has been associated with an increased risk of several complications,  including:  ·Preeclampsia and gestational hypertension  ·Placental abruption  ·Nonreassuring fetal heart rate tracing  · delivery, including very  delivery (before 32 weeks)  ·Low birth weight  ·Increased rate of  section  · morbidity and mortality  ·Neuropsychological and cognitive impairment  ·Postpartum hemorrhage    Overt hypothyroidism (elevated TSH, reduced free T4) complicating pregnancy is unusual. Two factors contribute to this finding: some hypothyroid women are anovulatory, and hypothyroidism (new or inadequately treated) complicating pregnancy is associated with an increased rate of first trimester spontaneous .  The risk of complications during pregnancy is lower in women with subclinical, rather than overt hypothyroidism. However, in some studies, women with subclinical hypothyroidism were also reported to be at increased risk for severe preeclampsia,  delivery, placental abruption, and/or pregnancy loss.  Isolated maternal hypothyroxinemia (low T4) is defined as a maternal free T4 concentration in the lower 5th or 10th percentile of the reference range, in conjunction with a normal TSH. The effect of isolated maternal hypothyroxinemia on  and  outcome is unclear.     All pregnant women with newly diagnosed overt hypothyroidism (thyroid-stimulating hormone [TSH] above trimester-specific normal reference range with low free thyroxine [T4]) should be treated with thyroid hormone (T4). In addition, because maternal euthyroidism is potentially important for normal fetal cognitive development, it is suggested to treat pregnant women with subclinical hypothyroidism. Because of the changes in thyroid physiology during normal pregnancy, thyroid function tests should be interpreted using trimester-specific TSH and T4 reference ranges for pregnant women. The upper limit of normal for TSH in the first trimester of pregnancy is approximately 2.5 mU/L  (3.0 mU/L in the second and third trimesters) rather than 4.5 to 5.0 mU/L used by most laboratories. Total T4 and T3 levels during pregnancy are 1.5-fold higher than in nonpregnant women. We do not suggest the treatment of pregnant women with isolated hypothyroxinemia (low free T4, normal TSH).    Thyroid function should be monitored throughout the pregnancy by assessing TSH and FT4 or FT3 about every 8-12 weeks and more frequently if the clinical situation dictates. Adjust the dose appropriately with the goal of maintaining the FT3/FT4 in the upper levels of the normal range and avoid over treatment. Perform ultrasound in the third trimester and as needed to assess growth and potential goiter.     Thyroid:    Lab Results   Component Value Date    TSH 1.676 2024    TSH 2.162 2024    TSH 3.072 2024    T4F 1.0 2024    T4F 0.8 01/10/2022    T4F 1.0 2019     We discussed the recommended plan of care based on her  risk factors.  Nanette and her significant other, Clinton, had their questions answered to their satisfaction.      IMPRESSION:  IUP at 21w5d  Normal level 2 ultrasound  Hypothyroidism, controlled    RECOMMENDATIONS:  Continue care with Ms. Hernadez  Monitor TSH every 8 to 12 weeks or more frequently if her dose requires adjustment  Follow-up fetal growth and BPP at 32 weeks    Total time spent 40 minutes this calendar day which includes preparing to see the patient including chart review, obtaining and/or reviewing additional medical history, performing a physical exam and evaluation, documenting clinical information in the electronic medical record, independently interpreting results, counseling the patient, communicating results to the patient/family/caregiver and coordinating care.     Case discussed with patient who demonstrated understanding and agreement with plan.     Thank you for allowing me to participate in the care of this patient.  Please feel free to contact me  with any questions.    Teresa Araiza MD  Maternal-Fetal Medicine       Note to patient and family:  The 21st Century Cures Act makes medical notes available to patients in the interest of transparency.  However, please be advised that this is a medical document.  It is intended as a peer to peer communication.  It is written in medical language and may contain abbreviations or verbiage that are technical and unfamiliar.  It may appear blunt or direct.  Medical documents are intended to carry relevant information, facts as evident, and the clinical opinion of the practitioner.

## 2024-06-12 ENCOUNTER — NURSE TRIAGE (OUTPATIENT)
Dept: FAMILY MEDICINE CLINIC | Facility: CLINIC | Age: 29
End: 2024-06-12

## 2024-06-12 ENCOUNTER — PATIENT MESSAGE (OUTPATIENT)
Dept: FAMILY MEDICINE CLINIC | Facility: CLINIC | Age: 29
End: 2024-06-12

## 2024-06-12 ENCOUNTER — OFFICE VISIT (OUTPATIENT)
Dept: INTERNAL MEDICINE CLINIC | Facility: CLINIC | Age: 29
End: 2024-06-12
Payer: COMMERCIAL

## 2024-06-12 VITALS
RESPIRATION RATE: 20 BRPM | HEART RATE: 83 BPM | BODY MASS INDEX: 22 KG/M2 | OXYGEN SATURATION: 98 % | DIASTOLIC BLOOD PRESSURE: 68 MMHG | SYSTOLIC BLOOD PRESSURE: 94 MMHG | TEMPERATURE: 98 F | WEIGHT: 117 LBS

## 2024-06-12 DIAGNOSIS — R09.81 SINUS CONGESTION: ICD-10-CM

## 2024-06-12 DIAGNOSIS — R53.83 OTHER FATIGUE: ICD-10-CM

## 2024-06-12 DIAGNOSIS — R05.1 ACUTE COUGH: Primary | ICD-10-CM

## 2024-06-12 DIAGNOSIS — J02.9 SORE THROAT: ICD-10-CM

## 2024-06-12 LAB
CONTROL LINE PRESENT WITH A CLEAR BACKGROUND (YES/NO): YES YES/NO
COVID19 BINAX NOW ANTIGEN: NOT DETECTED
KIT EXPIRATION DATE: ABNORMAL DATE
KIT LOT #: 7926 NUMERIC
OPERATOR ID: NORMAL
POCT EXPIRATION DATE: NORMAL
POCT LOT NUMBER: NORMAL

## 2024-06-12 PROCEDURE — 99204 OFFICE O/P NEW MOD 45 MIN: CPT | Performed by: INTERNAL MEDICINE

## 2024-06-12 PROCEDURE — 87880 STREP A ASSAY W/OPTIC: CPT | Performed by: INTERNAL MEDICINE

## 2024-06-12 RX ORDER — AZITHROMYCIN 250 MG/1
TABLET, FILM COATED ORAL
Qty: 6 TABLET | Refills: 0 | Status: SHIPPED | OUTPATIENT
Start: 2024-06-12 | End: 2024-06-16

## 2024-06-12 NOTE — PROGRESS NOTES
Subjective:     Patient ID: Nanette Wayne is a 29 year old female.    HPI  Patient comes in today with complaint of cough sinus congestion some sore throat fatigue body aches since Saturday she says her daughter was at the library with other kids Friday morning then daughter was sick on Friday evening daughter is feeling better she did have fever though patient started with symptoms Saturday overall feeling better but still with some cough and congestion sore throat is mild patient has had strep throat before she is not sure if feels the same usually the sore throat is worse with strep no loss of smell or taste patient is 5 months pregnant.  History/Other:   Review of Systems   Constitutional:  Positive for fatigue.   HENT:  Positive for congestion and sore throat.    Eyes: Negative.    Respiratory:  Positive for cough.    Cardiovascular: Negative.    Gastrointestinal: Negative.    Genitourinary: Negative.    Musculoskeletal: Negative.    Skin: Negative.    Neurological: Negative.    Psychiatric/Behavioral: Negative.       Current Outpatient Medications   Medication Sig Dispense Refill    prenatal vitamin with DHA 27-0.8-228 MG Oral Cap Take 1 capsule by mouth daily.      levothyroxine 25 MCG Oral Tab Take 1 tablet (25 mcg total) by mouth before breakfast. 90 tablet 3     Allergies:No Known Allergies    Past Medical History:    Thyroid disease    2 years ago on meds      Past Surgical History:   Procedure Laterality Date    Adams teeth removed  2021      Family History   Problem Relation Age of Onset    Hypertension Father     Hypertension Paternal Grandmother     Other (Other) Paternal Grandmother         kidney stones    Hypertension Paternal Grandfather       Social History:   Social History     Socioeconomic History    Marital status:      Spouse name: justin Loene    Number of children: 0    Highest education level: Bachelor's degree (e.g., BA, AB, BS)   Tobacco Use    Smoking status: Never    Smokeless  tobacco: Never   Vaping Use    Vaping status: Never Used   Substance and Sexual Activity    Alcohol use: Not Currently    Drug use: Never   Other Topics Concern    Blood Transfusions No    Caffeine Concern No    Occupational Exposure No    Stress Concern No    Special Diet No    Exercise No    Bike Helmet No    Seat Belt Yes     Social Determinants of Health     Financial Resource Strain: Low Risk  (3/14/2024)    Financial Resource Strain     Difficulty of Paying Living Expenses: Not hard at all     Med Affordability: No   Food Insecurity: No Food Insecurity (3/14/2024)    Food Insecurity     Food Insecurity: Never true   Transportation Needs: No Transportation Needs (3/14/2024)    Transportation Needs     Lack of Transportation: No   Stress: No Stress Concern Present (3/14/2024)    Stress     Feeling of Stress : No   Housing Stability: Low Risk  (3/14/2024)    Housing Stability     Housing Instability: No        Objective:   Physical Exam  Vitals and nursing note reviewed.   Constitutional:       Appearance: She is well-developed.   HENT:      Head: Normocephalic and atraumatic.      Right Ear: External ear normal.      Left Ear: External ear normal.      Nose: Nose normal.      Mouth/Throat:      Pharynx: Posterior oropharyngeal erythema present. No oropharyngeal exudate.   Eyes:      Conjunctiva/sclera: Conjunctivae normal.      Pupils: Pupils are equal, round, and reactive to light.   Cardiovascular:      Rate and Rhythm: Normal rate and regular rhythm.      Heart sounds: Normal heart sounds.   Pulmonary:      Effort: Pulmonary effort is normal.      Breath sounds: Normal breath sounds.   Abdominal:      General: Bowel sounds are normal.      Palpations: Abdomen is soft.   Genitourinary:     Vagina: Normal.   Musculoskeletal:         General: Normal range of motion.      Cervical back: Normal range of motion and neck supple.   Skin:     General: Skin is warm and dry.   Neurological:      Mental Status: She is  alert and oriented to person, place, and time.      Deep Tendon Reflexes: Reflexes are normal and symmetric.   Psychiatric:         Behavior: Behavior normal.         Thought Content: Thought content normal.         Judgment: Judgment normal.         Assessment & Plan:   1. Acute cough as needed medication for cough   2. Sinus congestion can try saline nasal spray   3. Other fatigue as above rapid COVID test was negative   4. Sore throatstrep test is +, will send Z pack, if not better any worsening symptoms let us know take Tylenol as needed for pain         Orders Placed This Encounter   Procedures    POC COVID19 BinaxNOW Antigen       Meds This Visit:  Requested Prescriptions      No prescriptions requested or ordered in this encounter       Imaging & Referrals:  None

## 2024-06-12 NOTE — TELEPHONE ENCOUNTER
Please reply to pool: EM RN TRIAGE  Action Requested: Summary for Provider     []  Critical Lab, Recommendations Needed  [] Need Additional Advice  [x]   FYI    []   Need Orders  [] Need Medications Sent to Pharmacy  []  Other     SUMMARY: Patient contacts clinic reporting sore throat, cough, head and chest congestion x 5 days.  24 weeks pregnant.  Denies fever, some body aches and chills. Denies chest pain or shortness of breath.  Throat is red, denies white patches.  History of strep. Inquires if she can take over the counter medications.  Acute visit booked today.      Reason for call: Sore Throat  Onset: Data Unavailable                       Reason for Disposition   Sore throat is main symptom and persists > 48 hours    Protocols used: Sore Throat-A-OH

## 2024-06-12 NOTE — TELEPHONE ENCOUNTER
From: Nanette Wayne  To: Marilu Oliveira  Sent: 6/12/2024 1:11 AM CDT  Subject: cough    I have been having sore throat, dry cough and sneezing since last Saturday. No fever so far, not sure if it is a cold or virus infection. Currently five and half months pregnant. Wondering if I should get check for virus infection? Thank you!

## 2024-06-25 ENCOUNTER — ROUTINE PRENATAL (OUTPATIENT)
Dept: OBGYN CLINIC | Facility: CLINIC | Age: 29
End: 2024-06-25

## 2024-06-25 VITALS — DIASTOLIC BLOOD PRESSURE: 65 MMHG | SYSTOLIC BLOOD PRESSURE: 100 MMHG | WEIGHT: 121 LBS | BODY MASS INDEX: 23 KG/M2

## 2024-06-25 DIAGNOSIS — Z34.92 PRENATAL CARE IN SECOND TRIMESTER (HCC): Primary | ICD-10-CM

## 2024-06-25 NOTE — PATIENT INSTRUCTIONS
Kick Counts  It’s normal to worry about your baby’s health. One way you can know your baby’s doing well is to record the baby’s movements once a day. This is called a kick count.   Remember to take your kick count records to all your appointments with your healthcare provider.  How to count kicks    Time how long it takes you to feel 10 kicks, flutters, swishes, or rolls. Ideally, you want to feel at least 10 movements in 2 hours. You will likely feel 10 movements in less time than that.  Starting at 28 weeks, count your baby's movements daily. Follow your healthcare provider's instructions for kick counting. Here are tips for counting kicks:  Choose a time when the baby is active, such as after a meal.   Sit comfortably or lie on your side.   The first time the baby moves, write down the time.   Count each movement until the baby has moved  10 times. This can take from 20 minutes to 2 hours.   If you haven't felt 10 kicks by the end of the second hour, wait a few hours. Then try again.  Try to do it at the same time each day.  When to call your healthcare provider  Call your healthcare provider  right away if:  You do a couple sets of kick counts during the day and your baby moves fewer than 10 times in 2 hours.  Your baby moves much less often than on the days before.  You haven't felt your baby move all day.  [employer] last reviewed this educational content on 2020    © 4915-1483 [employer]. All rights reserved. This information is not intended as a substitute for professional medical care. Always follow your healthcare professional's instructions. Premature Labor    Premature labor ( labor) is when symptoms of labor occur before 37 weeks of pregnancy. (This is 3 weeks before your due date.) Premature labor can lead to premature delivery. This means giving birth to your baby early. Babies need at least 37 weeks of pregnancy for all the organs to develop normally. The earlier the  delivery, the greater the risks to the baby.  In most cases, the cause of premature labor is unknown. But certain factors may make the problem more likely. These include:  History of premature labor with other pregnancies  Smoking  Alcohol or substance abuse  Low pre-pregnancy weight or weight gain during pregnancy  Short time period between pregnancies  Being pregnant with twins, triplets, or more  History of certain types of surgery on the cervix or uterus  Having a short cervix  Certain infections  There are a number of other risk factors. Ask your healthcare provider to help you understand the risk factors specific to your case. Then find out what you can do to control or reduce them.  Contractions are one of the main signs of premature labor. A contraction is different from cramping. It may feel painful and the belly (abdomen) may get hard. It can last from a few seconds to a few minutes. Some women may feel only a sense of pressure in the belly, thighs, rectum, or vagina. Some may feel only the hardening of the uterus without pain or pressure. Or there may be a constant pain in the lower back, which spreads forward toward the belly.Premature labor is often treated with medicines. A hospital stay may be needed. If labor doesn't progress and you and your baby are both healthy, you may be discharged to continue care at home.  Home care  Ask your provider any questions you have. Be certain you understand how to care for yourself at home. Also follow all recommendations given by your healthcare providers.  Learn the signs of premature labor. Watch for these signs when you get home.  Limit or restrict activities as advised. This may include stopping certain physical activities and cutting back hours at work.  Avoid doing strenuous work as directed by your provider. Ask family and friends for help with tasks and support at home, if needed.  Don’t smoke, drink alcohol, or use other harmful substances.  Take steps to  reduce stress.  Report any unusual symptoms to your provider.    Follow-up care  Follow up with your healthcare provider, or as directed. Weekly visits with your provider may be needed.  When to seek medical advice  Call your healthcare provider right away if any of these occur:  Regular or frequent contractions, whether they are painful or not  Pressure in the pelvis  Pressure in the lower belly or mild cramping in your belly with or without diarrhea  Constant low, dull backache  Gush or slow leaking of water from your vagina  Change in vaginal discharge (watery, mucus, or bloody)  Any vaginal bleeding  Decreased movement of your baby  Manny last reviewed this educational content on 2018 [employer]. All rights reserved. This information is not intended as a substitute for professional medical care. Always follow your healthcare professional's instructions.     Understanding Preeclampsia  Preeclampsia is a condition that can happen in pregnancy. It includes high blood pressure (hypertension), swelling, and signs of organ problems. It can show up around week 20 of pregnancy. It often goes away by 12 weeks after you give birth. It can lead to serious health risks for you and your baby. During your pregnancy, your healthcare provider will watch your blood pressure.      Your blood pressure will be monitored regularly throughout your pregnancy to help check for preeclampsia.     Dangers of preeclampsia   If not treated, preeclampsia can cause problems for you and your baby. The placenta is the organ that nourishes your baby. It may tear away from the wall of the uterus. This can put the baby at risk for health problems (fetal distress). It can put the baby at risk for  birth. Preeclampsia can also cause these health problems in you:   Kidney failure or other organ damage  Seizures  Stroke  Who’s at risk for preeclampsia?   No one knows what causes preeclampsia. It can happen  in any pregnant person. But there are things that increase your risk. You may need to take a daily low dose of aspirin if you are at risk for preeclampsia.   You’re at higher risk for preeclampsia if you have any of these:  Diabetes  High blood pressure  Obesity  Kidney disease  Autoimmune disease such as lupus  A family history of preeclampsia  You’re at higher risk if any of these apply to you:  This is your first pregnancy  You are having twins or more  You’re under age 18 or over age 40  You used in vitro fertilization  You are Black  And you’re at higher risk if you had any of these in a past pregnancy:   Preeclampsia  Intrauterine growth retardation (IUGR)   birth  Placental abruption  Fetal death  Symptoms  A common symptom of preeclampsia is high blood pressure. Other symptoms may include:   Fast weight gain  Protein in your urine  Headache  Belly (abdominal) pain on your right side  Vision problems such as flashes or spots  Swelling (edema) in your face or hands (this often happens near the end of a normal pregnancy)  Tests you may have   Your healthcare provider will want to check your blood pressure. This will need to be done often in your pregnancy. If your blood pressure is high, you may have these tests:   Urine tests to look for protein  Blood tests to confirm preeclampsia  Fetal monitoring to make sure that your baby is healthy  Treating preeclampsia   Preeclampsia almost always ends soon after you give birth. Until then, your healthcare provider can help you manage it.   If your symptoms are mild, you may to:     Limit your activity  Rest in bed  Not do heavy lifting    If your symptoms are severe, you will stay in the hospital. Treatment here may include:   Limits to your activity. This is to help control your blood pressure. You should not lift anything heavy. You will need to spend 8 hours a day lying down with your feet up.  Magnesium IV (intravenous) drip. This is done during labor. It's  to prevent seizures  Induced labor or  section. Birth of the baby is considered the cure for preeclampsia.  When to call your healthcare provider   Call your healthcare provider if your symptoms start quickly or are severe. This includes swelling, weight gain, or other symptoms. Some cases of preeclampsia are more severe than others. Your symptoms also may change or get worse as you get closer to your due date.   Once you give birth   In most cases, preeclampsia goes away on its own soon after you give birth. This is often by the 12th week after you deliver. Within days after you give birth, your blood pressure, swelling, and other symptoms should get better. But for some people, problems from preeclampsia can continue after birth.   Postpartum preeclampsia   Preeclampsia that starts after birth is rare. There are 2 types:   Postpartum preeclampsia. This may start in the first 48 hours after birth.  Late-onset preeclampsia. This starts more than 48 hours after birth.  Both of these types are rare. But call your healthcare provider right away if you have symptoms of preeclampsia after you give birth.   Manny last reviewed this educational content on 10/1/2021    © 5612-5262 [employer]. All rights reserved. This information is not intended as a substitute for professional medical care. Always follow your healthcare professional's instructions.

## 2024-06-25 NOTE — PROGRESS NOTES
Here for [facility] visit.      Patient's last menstrual period was 2023 (within days). 2024, by Ultrasound 26w3d     Baby active. Denies contractions, LOF or bleeding    Labs: CBC, 1hr, & hep B ordered  Ultrasound: Has growth ultrasound scheduled        ICD-10-CM    1. Prenatal care in second trimester ([facility])  Z34.92           Fetal kick counts, warning signs and signs PTL reviewed.

## 2024-07-02 ENCOUNTER — LAB ENCOUNTER (OUTPATIENT)
Dept: LAB | Facility: HOSPITAL | Age: 29
End: 2024-07-02
Attending: ADVANCED PRACTICE MIDWIFE
Payer: COMMERCIAL

## 2024-07-02 DIAGNOSIS — Z34.92 PRENATAL CARE IN SECOND TRIMESTER (HCC): ICD-10-CM

## 2024-07-02 LAB
DEPRECATED RDW RBC AUTO: 43.8 FL (ref 35.1–46.3)
ERYTHROCYTE [DISTWIDTH] IN BLOOD BY AUTOMATED COUNT: 12.8 % (ref 11–15)
GLUCOSE 1H P GLC SERPL-MCNC: 86 MG/DL
HBV SURFACE AG SER-ACNC: <0.1 [IU]/L
HBV SURFACE AG SERPL QL IA: NONREACTIVE
HCT VFR BLD AUTO: 34.5 %
HGB BLD-MCNC: 11.8 G/DL
MCH RBC QN AUTO: 32.3 PG (ref 26–34)
MCHC RBC AUTO-ENTMCNC: 34.2 G/DL (ref 31–37)
MCV RBC AUTO: 94.5 FL
PLATELET # BLD AUTO: 199 10(3)UL (ref 150–450)
RBC # BLD AUTO: 3.65 X10(6)UL
WBC # BLD AUTO: 7.5 X10(3) UL (ref 4–11)

## 2024-07-02 PROCEDURE — 87340 HEPATITIS B SURFACE AG IA: CPT

## 2024-07-02 PROCEDURE — 36415 COLL VENOUS BLD VENIPUNCTURE: CPT

## 2024-07-02 PROCEDURE — 82950 GLUCOSE TEST: CPT

## 2024-07-02 PROCEDURE — 85027 COMPLETE CBC AUTOMATED: CPT

## 2024-07-10 ENCOUNTER — ROUTINE PRENATAL (OUTPATIENT)
Dept: OBGYN CLINIC | Facility: CLINIC | Age: 29
End: 2024-07-10

## 2024-07-10 VITALS
DIASTOLIC BLOOD PRESSURE: 62 MMHG | BODY MASS INDEX: 23 KG/M2 | SYSTOLIC BLOOD PRESSURE: 99 MMHG | WEIGHT: 122.19 LBS | HEART RATE: 85 BPM

## 2024-07-10 DIAGNOSIS — Z20.2 EXPOSURE TO STD: ICD-10-CM

## 2024-07-10 DIAGNOSIS — Z34.83 PRENATAL CARE, SUBSEQUENT PREGNANCY IN THIRD TRIMESTER (HCC): Primary | ICD-10-CM

## 2024-07-10 PROCEDURE — 90715 TDAP VACCINE 7 YRS/> IM: CPT | Performed by: ADVANCED PRACTICE MIDWIFE

## 2024-07-10 PROCEDURE — 90471 IMMUNIZATION ADMIN: CPT | Performed by: ADVANCED PRACTICE MIDWIFE

## 2024-07-10 NOTE — PATIENT INSTRUCTIONS
Adapting to Pregnancy: Third Trimester    Although common during pregnancy, some discomforts may seem worse in the final weeks. Simple lifestyle changes can help. Take care of yourself. And ask your partner to help out with small tasks.  Limiting leg problems  Ways to combat leg issues:  Wear support hose all day.  Avoid snug shoes and clothes that bind, like tight pants and socks with elastic tops.  Sit with your feet and legs raised often.  Caring for your breasts  Tips to follow include:  Wash with plain water. Avoid using harsh soaps or rubbing alcohol. They may cause dryness.  Wear a nursing bra for extra support. It can also hide any leaks from your nipples.  Controlling hemorrhoids  Ways to avoid hemorrhoids include:  Eat foods that are high in fiber. Also, exercise and drink enough fluids. This will reduce constipation and hemorrhoids.  Sleep and nap on your side. This limits pressure on the veins of your rectum.  Try not to stand or sit for long periods.  Controlling back pain  As your body changes during pregnancy, your back must work in new ways. Back pain is due to many causes. Physical changes in your body can strain your back and its supporting muscles. Also, hormones (chemicals that carry messages throughout the body) increase during pregnancy. This can affect how your muscles and joints work together. All of these changes can lead to pain. Pain may be felt in the upper or lower back. Pain is also common in the pelvis. Some pregnant women have sciatica. This is pain caused by pressure on the sciatic nerve running down the back of the leg. Ask your healthcare provider for specific tips and exercises to help control your back pain.  Tips to help you rest  Good rest and sleep will help you feel better. Here are some ideas:  Ask your partner to massage your shoulders, neck, or back.  Limit the errands you do each day.  Lie down in the afternoon or after work for a few minutes.  Take a warm bath before  you go to sleep.  Drink warm milk or teas without caffeine.  Avoid coffee, black tea, and cola.  Stopping heartburn  Avoid spicy, greasy, fried, or acidic foods.  Eat small amounts more often. Eat slowly. Wait 2 hours after eating before lying down.  Sleep with your upper body raised 6 inches.   Managing mood swings  Ways to manage mood swings include:  Know that mood changes are normal.  Exercise often, but get plenty of rest.  Address any concerns and limit stress. Talking to your partner, other women, or your healthcare provider may help.  Dealing with urinary frequency  Tips to deal with having to urinate often include:  Drink plenty of water all day. If you drink a lot in the evening, though, you may have to get up more in the night.  Limit coffee, black tea, and cola.  [employer] last reviewed this educational content on 2/1/2018 © 2000-2020 The [employer]. [address] [place] [address]. All rights reserved. This information is not intended as a substitute for professional medical care. Always follow your healthcare professional's instructions.        Pregnancy: Your Third Trimester Changes  As the baby grows, your body changes too. You may also see signs that your body is getting ready for labor. Be patient. Within a few more weeks, your baby will be born.  How you are changing  Your body is preparing for the birth of your baby. Some of the most common changes are listed below. If you have any questions or concerns, ask your healthcare provider:  You’ll gain more weight from fluids, extra blood, and fat deposits.  Your breasts will grow as your body gets ready to feed the baby. They may be more tender. You may also notice a slight yellow or white discharge from the nipples.  Discharge from your vagina may increase. This is normal.  You might see some skin color changes on your forehead, cheeks, or nose. Most of these will go away after you deliver.  How your baby is growing       Month  7  Your baby can open and close his or her eyes and weighs around 4 pounds. If born prematurely (too early), your baby would likely survive with special care. Month 8  Your baby is building up body fat and weighs around 6 pounds. Month 9  Your baby weighs nearly 7 pounds and is about 19 to 21 inches long. In other words, any day now...   [employer] last reviewed this educational content on 2018 [employer]. [address] [place] [address], [place] [address]. All rights reserved. This information is not intended as a substitute for professional medical care. Always follow your healthcare professional's instructions.   Understanding  Labor  Going into labor before your 37th week of pregnancy is called  labor.  labor can cause your baby to be born too soon. This can lead to a number of health problems that may affect your baby.      Before labor, the cervix is thick and closed.      In  labor, the cervix begins to efface (thin) and dilate (open).   Symptoms of  labor   If you think you’re having  labor, get medical help right away. Contractions alone don’t mean you’re in  labor. What matters more are changes in your cervix (the lower end of the uterus). Symptoms of  labor include:   Four or more contractions per hour  Strong contractions  Constant menstrual-like cramping  Low-back pain  Mucous or bloody vaginal discharge  Bleeding or spotting in the second or third trimester  Evaluating  labor   Your healthcare provider will try to find out whether you’re in  labor or whether you’re just having contractions. He or she may watch you for a few hours. The following tests may be done:   Pelvic exam to see if your cervix has effaced (thinned) and dilated (opened)  Uterine activity monitoring to detect contractions  Fetal monitoring to check the health of your baby  Ultrasound to check your baby’s size and position  Amniocentesis to  check how mature your baby’s lungs are  Caring for yourself at home   If you have  contractions, but your cervix is still thick and closed, your healthcare provider may ask you to do the following at home:   Drink plenty of water.  Do fewer activities.  Rest in bed on your side.  Don't have intercourse or nipple stimulation.  When to call your healthcare provider   Call your healthcare provider if you notice any of these:   Four or more contractions per hour  Bag of water breaks  Bleeding or spotting  If you need hospital care    labor often requires that you have hospital care and complete bed rest. You may have an IV (intravenous) line to get fluids. You may be given pills or injections to help prevent contractions. Finally, you may get medicine (corticosteroids) that helps your baby’s lungs mature more quickly.   Are you at risk?   Any pregnant woman can have  labor. It may start for no reason. But these risk factors can increase your chances:   Past  labor or past early birth  Smoking, drug, or alcohol use during pregnancy  Multiple fetuses (twins or more)  Problems with the shape of the uterus  Bleeding during the pregnancy  The dangers of  birth   A baby born too soon may have health problems. This is because the baby didn’t have enough time to mature. Some of the risks for your baby include:   Not breastfeeding or feeding well  Having immature lungs  Bleeding in the brain  Dying  Reaching term   Your goal is to get as close to term as you can before giving birth. The closer you get to term, the higher your chance of having a healthy baby. Work with your healthcare provider. Together, you can take steps that may keep you from giving birth too early.   [employer] last reviewed this educational content on 3/1/2019  © 4159-4242 The [employer], [employer]. [address] [place] [address], [place] [address]. All rights reserved. This information is not intended as a substitute for  professional medical care. Always follow your healthcare professional's instructions.        Premature Labor    Premature labor ( labor) is when symptoms of labor occur before 37 weeks of pregnancy. (This is 3 weeks before your due date.) Premature labor can lead to premature delivery. This means giving birth to your baby early. Babies need at least 37 weeks of pregnancy for all the organs to develop normally. The earlier the delivery, the greater the risks to the baby.  In most cases, the cause of premature labor is unknown. But certain factors may make the problem more likely. These include:  History of premature labor with other pregnancies  Smoking  Alcohol or substance abuse  Low pre-pregnancy weight or weight gain during pregnancy  Short time period between pregnancies  Being pregnant with twins, triplets, or more  History of certain types of surgery on the cervix or uterus  Having a short cervix  Certain infections  There are a number of other risk factors. Ask your healthcare provider to help you understand the risk factors specific to your case. Then find out what you can do to control or reduce them.  Contractions are one of the main signs of premature labor. A contraction is different from cramping. It may feel painful and the belly (abdomen) may get hard. It can last from a few seconds to a few minutes. Some women may feel only a sense of pressure in the belly, thighs, rectum, or vagina. Some may feel only the hardening of the uterus without pain or pressure. Or there may be a constant pain in the lower back, which spreads forward toward the belly.Premature labor is often treated with medicines. A hospital stay may be needed. If labor doesn't progress and you and your baby are both healthy, you may be discharged to continue care at home.  Home care  Ask your provider any questions you have. Be certain you understand how to care for yourself at home. Also follow all recommendations given by your  healthcare providers.  Learn the signs of premature labor. Watch for these signs when you get home.  Limit or restrict activities as advised. This may include stopping certain physical activities and cutting back hours at work.  Avoid doing strenuous work as directed by your provider. Ask family and friends for help with tasks and support at home, if needed.  Don’t smoke, drink alcohol, or use other harmful substances.  Take steps to reduce stress.  Report any unusual symptoms to your provider.    Follow-up care  Follow up with your healthcare provider, or as directed. Weekly visits with your provider may be needed.  When to seek medical advice  Call your healthcare provider right away if any of these occur:  Regular or frequent contractions, whether they are painful or not  Pressure in the pelvis  Pressure in the lower belly or mild cramping in your belly with or without diarrhea  Constant low, dull backache  Gush or slow leaking of water from your vagina  Change in vaginal discharge (watery, mucus, or bloody)  Any vaginal bleeding  Decreased movement of your baby  [employer] last reviewed this educational content on 6/1/2018 © 2000-2020 The [employer]. [address] [place], [place] [id number]. All rights reserved. This information is not intended as a substitute for professional medical care. Always follow your healthcare professional's instructions.        Understanding Preeclampsia  Preeclampsia is high blood pressure (hypertension) that happens during pregnancy. It often shows up around the 20th week of pregnancy. It often goes back to normal by the 12th week after you give birth. It can lead to serious health risks for you and your baby. During your pregnancy, your healthcare provider will watch your blood pressure.    Symptoms  A common symptom of preeclampsia is high blood pressure. Other symptoms may include:  Rapid weight gain  Protein in your urine  Headache  Belly (abdominal) pain on your  right side  Vision problems. These include flashes or spots.  Swelling (edema) in your face or hands. This also often happens near the end of normal pregnancies, even without preeclampsia.  Tests you may have  Your healthcare provider will want to check your blood pressure throughout your pregnancy. If your blood pressure is high, you may have the following tests:  Urine tests to look for protein  Blood tests to confirm preeclampsia  Fetal monitoring to make sure that your baby is healthy  Treating preeclampsia  You may need to take a daily low dose of aspirin if you are at risk for preeclampsia. Preeclampsia almost always ends soon after you give birth. Until then, your healthcare provider can help manage your condition. If your symptoms are mild, you may need activity limits at home, including bed rest and no heavy lifting. If your symptoms are severe, you will stay in the hospital. Hospital treatment includes:  Activity limits to help control blood pressure. This means no heavy lifting and 8 hours per day lying down with the feet up.  Magnesium IV (intravenous) drip during labor to prevent seizures  Induced labor or surgical delivery by  section. Delivery is considered the cure for preeclampsia.  When to call your healthcare provider  Call your healthcare provider if swelling, weight gain, or other symptoms come on quickly or are severe. Some cases of preeclampsia are more severe than others. Your symptoms also may change or get worse as you get closer to your due date.  Who’s at risk?  No one knows what causes preeclampsia. Preeclampsia can happen in any pregnant woman. But it is more common in first-time pregnancies. Things that increase the risk include:  Previous pregnancies. You are at risk if you had preeclampsia, intrauterine growth retardation (IUGR),  birth, placental abruption, or fetal death in a past pregnancy.  Health history of mother. You are at risk if you have diabetes, high blood  pressure, obesity, kidney disease, autoimmune disease such as lupus, or a family history of preeclampsia.  Current pregnancy. You are at risk if this is your first pregnancy, or if you have multiple fetuses, are younger than age 18 or older than 40, or used in vitro fertilization.  Race. You are at risk if you are black.  Dangers of preeclampsia  If not treated, preeclampsia can cause problems for you and your baby. The placenta is the organ that nourishes your baby. It may tear away from the uterine wall. This can put the baby at risk for health problems (fetal distress) and premature birth. Preeclampsia can also cause these health problems:  Kidney failure or other organ damage  Seizures  Stroke  Once you give birth  In most cases, preeclampsia goes away on its own soon after you give birth. This is often by the 12th week after you give deliver. Within days of delivery, your blood pressure, swelling, and other symptoms should get better. For some women, problems from preeclampsia can continue after delivery.  Postpartum preeclampsia that develops within the first 48 hours after delivery is rare. Another type of postpartum preeclampsia that develops more than 48 hours after delivery is called late-onset preeclampsia. It is also rare. Contact your healthcare provider right away if you have symptoms of preeclampsia after you deliver.  [employer] last reviewed this educational content on 12/1/2019  © 9355-4601 The [employer], [employer]. [address] [place] [address], [place] [address]. All rights reserved. This information is not intended as a substitute for professional medical care. Always follow your healthcare professional's instructions.        Kick Counts    It’s normal to worry about your baby’s health. One way you can know your baby’s doing well is to record the baby’s movements once a day. This is called a kick count. Remember to take your kick count records to all your appointments with your healthcare provider.  How  to count kicks  Time how long it takes you to feel 10 kicks, flutters, swishes, or rolls. Ideally, you want to feel at least 10 movements within 2 hours. You will likely feel 10 movements in less time than that.  Starting at 28 weeks, count your baby's movements daily. Follow your healthcare provider's instructions for kick counting. Here are tips for counting kicks:  Choose a time when the baby is active, such as after a meal.   Sit comfortably or lie on your side.   The first time the baby moves, write down the time.   Count each movement until the baby has moved 10 times. This can take from 20 minutes to 2 hours.   If you have not felt 10 kicks by the end of the second hour, wait a few hours. Then try again.  Try to do it at the same time each day.  When to call your healthcare provider  Call your healthcare provider right away if:  You do a couple sets of kick counts during the day and your baby moves fewer than 10 times in 2 hours  Your baby moves much less often than on the days before.  You have not felt your baby move all day.  [employer] last reviewed this educational content on 12/1/2017  © 6637-9656 [employer], [employer]. [address] [place] [address], [place] [address]. All rights reserved. This information is not intended as a substitute for professional medical care. Always follow your healthcare professional's instructions.

## 2024-07-10 NOTE — PROGRESS NOTES
Nanette, , is at 28w4d, here for her [facility] visit.  Currently, she is feeling well. Denies 3rd trimester danger signs. Accepts Tdap today.    Vital signs and weight reviewed  See flowsheets     Assessment/Plan: Tdap given. HIV/Trep ordered  Next visit: 2 weeks    Reviewed:   Prenatal visit schedule  Recommendations and rationale for Tdap vaccine(s) in pregnancy   labor precautions  Kick counts  Danger signs  Condoms to decrease risk of HepB transmission    Pt verbalized understanding. All questions answered. No barriers to learning identified

## 2024-07-18 ENCOUNTER — LAB ENCOUNTER (OUTPATIENT)
Dept: LAB | Facility: HOSPITAL | Age: 29
End: 2024-07-18
Attending: ADVANCED PRACTICE MIDWIFE
Payer: COMMERCIAL

## 2024-07-18 DIAGNOSIS — Z34.83 PRENATAL CARE, SUBSEQUENT PREGNANCY IN THIRD TRIMESTER (HCC): ICD-10-CM

## 2024-07-18 LAB — T PALLIDUM AB SER QL IA: NONREACTIVE

## 2024-07-18 PROCEDURE — 36415 COLL VENOUS BLD VENIPUNCTURE: CPT

## 2024-07-18 PROCEDURE — 86780 TREPONEMA PALLIDUM: CPT

## 2024-07-18 PROCEDURE — 87389 HIV-1 AG W/HIV-1&-2 AB AG IA: CPT

## 2024-07-25 ENCOUNTER — ROUTINE PRENATAL (OUTPATIENT)
Dept: OBGYN CLINIC | Facility: CLINIC | Age: 29
End: 2024-07-25

## 2024-07-25 VITALS
BODY MASS INDEX: 24 KG/M2 | HEART RATE: 88 BPM | DIASTOLIC BLOOD PRESSURE: 72 MMHG | WEIGHT: 126 LBS | SYSTOLIC BLOOD PRESSURE: 106 MMHG

## 2024-07-25 DIAGNOSIS — Z34.83 PRENATAL CARE, SUBSEQUENT PREGNANCY IN THIRD TRIMESTER (HCC): Primary | ICD-10-CM

## 2024-07-25 NOTE — PROGRESS NOTES
Feeling well. Endorses regular fetal movement. Denies contractions, LOF, vaginal bleeding.     Has had some shortness of breath when lying down at night. States she has used a pulse oximeter and O2 sats were 97%Denies SOB during the day or chest pain. Discussed possibility of heartburn. Recommend monitoring. Call with worsening symptoms.    Plan:  [facility]IRA 2 weeks    Reviewed third trimester and  labor warning signs and when to call.

## 2024-08-07 ENCOUNTER — ROUTINE PRENATAL (OUTPATIENT)
Dept: OBGYN CLINIC | Facility: CLINIC | Age: 29
End: 2024-08-07

## 2024-08-07 VITALS
HEART RATE: 90 BPM | BODY MASS INDEX: 24 KG/M2 | DIASTOLIC BLOOD PRESSURE: 64 MMHG | SYSTOLIC BLOOD PRESSURE: 100 MMHG | WEIGHT: 129 LBS

## 2024-08-07 DIAGNOSIS — Z34.83 PRENATAL CARE, SUBSEQUENT PREGNANCY IN THIRD TRIMESTER (HCC): Primary | ICD-10-CM

## 2024-08-07 DIAGNOSIS — E03.9 ACQUIRED HYPOTHYROIDISM: ICD-10-CM

## 2024-08-07 DIAGNOSIS — Z20.2 EXPOSURE TO STD: ICD-10-CM

## 2024-08-07 PROBLEM — N81.89 VAGINAL RELAXATION: Status: RESOLVED | Noted: 2022-03-18 | Resolved: 2024-08-07

## 2024-08-07 NOTE — PATIENT INSTRUCTIONS
Adapting to Pregnancy: Third Trimester    Although common during pregnancy, some discomforts may seem worse in the final weeks. Simple lifestyle changes can help. Take care of yourself. And ask your partner to help out with small tasks.  Limiting leg problems  Ways to combat leg issues:  Wear support hose all day.  Avoid snug shoes and clothes that bind, like tight pants and socks with elastic tops.  Sit with your feet and legs raised often.  Caring for your breasts  Tips to follow include:  Wash with plain water. Avoid using harsh soaps or rubbing alcohol. They may cause dryness.  Wear a nursing bra for extra support. It can also hide any leaks from your nipples.  Controlling hemorrhoids  Ways to avoid hemorrhoids include:  Eat foods that are high in fiber. Also, exercise and drink enough fluids. This will reduce constipation and hemorrhoids.  Sleep and nap on your side. This limits pressure on the veins of your rectum.  Try not to stand or sit for long periods.  Controlling back pain  As your body changes during pregnancy, your back must work in new ways. Back pain is due to many causes. Physical changes in your body can strain your back and its supporting muscles. Also, hormones (chemicals that carry messages throughout the body) increase during pregnancy. This can affect how your muscles and joints work together. All of these changes can lead to pain. Pain may be felt in the upper or lower back. Pain is also common in the pelvis. Some pregnant women have sciatica. This is pain caused by pressure on the sciatic nerve running down the back of the leg. Ask your healthcare provider for specific tips and exercises to help control your back pain.  Tips to help you rest  Good rest and sleep will help you feel better. Here are some ideas:  Ask your partner to massage your shoulders, neck, or back.  Limit the errands you do each day.  Lie down in the afternoon or after work for a few minutes.  Take a warm bath before  you go to sleep.  Drink warm milk or teas without caffeine.  Avoid coffee, black tea, and cola.  Stopping heartburn  Avoid spicy, greasy, fried, or acidic foods.  Eat small amounts more often. Eat slowly. Wait 2 hours after eating before lying down.  Sleep with your upper body raised 6 inches.   Managing mood swings  Ways to manage mood swings include:  Know that mood changes are normal.  Exercise often, but get plenty of rest.  Address any concerns and limit stress. Talking to your partner, other women, or your healthcare provider may help.  Dealing with urinary frequency  Tips to deal with having to urinate often include:  Drink plenty of water all day. If you drink a lot in the evening, though, you may have to get up more in the night.  Limit coffee, black tea, and cola.  [employer] last reviewed this educational content on 2/1/2018 © 2000-2020 The [employer]. [address] [place] [address]. All rights reserved. This information is not intended as a substitute for professional medical care. Always follow your healthcare professional's instructions.        Pregnancy: Your Third Trimester Changes  As the baby grows, your body changes too. You may also see signs that your body is getting ready for labor. Be patient. Within a few more weeks, your baby will be born.  How you are changing  Your body is preparing for the birth of your baby. Some of the most common changes are listed below. If you have any questions or concerns, ask your healthcare provider:  You’ll gain more weight from fluids, extra blood, and fat deposits.  Your breasts will grow as your body gets ready to feed the baby. They may be more tender. You may also notice a slight yellow or white discharge from the nipples.  Discharge from your vagina may increase. This is normal.  You might see some skin color changes on your forehead, cheeks, or nose. Most of these will go away after you deliver.  How your baby is growing       Month  7  Your baby can open and close his or her eyes and weighs around 4 pounds. If born prematurely (too early), your baby would likely survive with special care. Month 8  Your baby is building up body fat and weighs around 6 pounds. Month 9  Your baby weighs nearly 7 pounds and is about 19 to 21 inches long. In other words, any day now...   [employer] last reviewed this educational content on 2018 The [employer]. [address] [place] [address], [place] [address]. All rights reserved. This information is not intended as a substitute for professional medical care. Always follow your healthcare professional's instructions.   Understanding  Labor  Going into labor before your 37th week of pregnancy is called  labor.  labor can cause your baby to be born too soon. This can lead to a number of health problems that may affect your baby.      Before labor, the cervix is thick and closed.      In  labor, the cervix begins to efface (thin) and dilate (open).   Symptoms of  labor   If you think you’re having  labor, get medical help right away. Contractions alone don’t mean you’re in  labor. What matters more are changes in your cervix (the lower end of the uterus). Symptoms of  labor include:   Four or more contractions per hour  Strong contractions  Constant menstrual-like cramping  Low-back pain  Mucous or bloody vaginal discharge  Bleeding or spotting in the second or third trimester  Evaluating  labor   Your healthcare provider will try to find out whether you’re in  labor or whether you’re just having contractions. He or she may watch you for a few hours. The following tests may be done:   Pelvic exam to see if your cervix has effaced (thinned) and dilated (opened)  Uterine activity monitoring to detect contractions  Fetal monitoring to check the health of your baby  Ultrasound to check your baby’s size and position  Amniocentesis to  check how mature your baby’s lungs are  Caring for yourself at home   If you have  contractions, but your cervix is still thick and closed, your healthcare provider may ask you to do the following at home:   Drink plenty of water.  Do fewer activities.  Rest in bed on your side.  Don't have intercourse or nipple stimulation.  When to call your healthcare provider   Call your healthcare provider if you notice any of these:   Four or more contractions per hour  Bag of water breaks  Bleeding or spotting  If you need hospital care    labor often requires that you have hospital care and complete bed rest. You may have an IV (intravenous) line to get fluids. You may be given pills or injections to help prevent contractions. Finally, you may get medicine (corticosteroids) that helps your baby’s lungs mature more quickly.   Are you at risk?   Any pregnant woman can have  labor. It may start for no reason. But these risk factors can increase your chances:   Past  labor or past early birth  Smoking, drug, or alcohol use during pregnancy  Multiple fetuses (twins or more)  Problems with the shape of the uterus  Bleeding during the pregnancy  The dangers of  birth   A baby born too soon may have health problems. This is because the baby didn’t have enough time to mature. Some of the risks for your baby include:   Not breastfeeding or feeding well  Having immature lungs  Bleeding in the brain  Dying  Reaching term   Your goal is to get as close to term as you can before giving birth. The closer you get to term, the higher your chance of having a healthy baby. Work with your healthcare provider. Together, you can take steps that may keep you from giving birth too early.   [employer] last reviewed this educational content on 3/1/2019  © 7476-2385 [employer]. [address] [place] [address], [place] [address]. All rights reserved. This information is not intended as a substitute for  professional medical care. Always follow your healthcare professional's instructions.        Premature Labor    Premature labor ( labor) is when symptoms of labor occur before 37 weeks of pregnancy. (This is 3 weeks before your due date.) Premature labor can lead to premature delivery. This means giving birth to your baby early. Babies need at least 37 weeks of pregnancy for all the organs to develop normally. The earlier the delivery, the greater the risks to the baby.  In most cases, the cause of premature labor is unknown. But certain factors may make the problem more likely. These include:  History of premature labor with other pregnancies  Smoking  Alcohol or substance abuse  Low pre-pregnancy weight or weight gain during pregnancy  Short time period between pregnancies  Being pregnant with twins, triplets, or more  History of certain types of surgery on the cervix or uterus  Having a short cervix  Certain infections  There are a number of other risk factors. Ask your healthcare provider to help you understand the risk factors specific to your case. Then find out what you can do to control or reduce them.  Contractions are one of the main signs of premature labor. A contraction is different from cramping. It may feel painful and the belly (abdomen) may get hard. It can last from a few seconds to a few minutes. Some women may feel only a sense of pressure in the belly, thighs, rectum, or vagina. Some may feel only the hardening of the uterus without pain or pressure. Or there may be a constant pain in the lower back, which spreads forward toward the belly.Premature labor is often treated with medicines. A hospital stay may be needed. If labor doesn't progress and you and your baby are both healthy, you may be discharged to continue care at home.  Home care  Ask your provider any questions you have. Be certain you understand how to care for yourself at home. Also follow all recommendations given by your  healthcare providers.  Learn the signs of premature labor. Watch for these signs when you get home.  Limit or restrict activities as advised. This may include stopping certain physical activities and cutting back hours at work.  Avoid doing strenuous work as directed by your provider. Ask family and friends for help with tasks and support at home, if needed.  Don’t smoke, drink alcohol, or use other harmful substances.  Take steps to reduce stress.  Report any unusual symptoms to your provider.    Follow-up care  Follow up with your healthcare provider, or as directed. Weekly visits with your provider may be needed.  When to seek medical advice  Call your healthcare provider right away if any of these occur:  Regular or frequent contractions, whether they are painful or not  Pressure in the pelvis  Pressure in the lower belly or mild cramping in your belly with or without diarrhea  Constant low, dull backache  Gush or slow leaking of water from your vagina  Change in vaginal discharge (watery, mucus, or bloody)  Any vaginal bleeding  Decreased movement of your baby  [employer] last reviewed this educational content on 6/1/2018 © 2000-2020 The [employer]. [address] [place], [place] [id number]. All rights reserved. This information is not intended as a substitute for professional medical care. Always follow your healthcare professional's instructions.        Understanding Preeclampsia  Preeclampsia is high blood pressure (hypertension) that happens during pregnancy. It often shows up around the 20th week of pregnancy. It often goes back to normal by the 12th week after you give birth. It can lead to serious health risks for you and your baby. During your pregnancy, your healthcare provider will watch your blood pressure.    Symptoms  A common symptom of preeclampsia is high blood pressure. Other symptoms may include:  Rapid weight gain  Protein in your urine  Headache  Belly (abdominal) pain on your  right side  Vision problems. These include flashes or spots.  Swelling (edema) in your face or hands. This also often happens near the end of normal pregnancies, even without preeclampsia.  Tests you may have  Your healthcare provider will want to check your blood pressure throughout your pregnancy. If your blood pressure is high, you may have the following tests:  Urine tests to look for protein  Blood tests to confirm preeclampsia  Fetal monitoring to make sure that your baby is healthy  Treating preeclampsia  You may need to take a daily low dose of aspirin if you are at risk for preeclampsia. Preeclampsia almost always ends soon after you give birth. Until then, your healthcare provider can help manage your condition. If your symptoms are mild, you may need activity limits at home, including bed rest and no heavy lifting. If your symptoms are severe, you will stay in the hospital. Hospital treatment includes:  Activity limits to help control blood pressure. This means no heavy lifting and 8 hours per day lying down with the feet up.  Magnesium IV (intravenous) drip during labor to prevent seizures  Induced labor or surgical delivery by  section. Delivery is considered the cure for preeclampsia.  When to call your healthcare provider  Call your healthcare provider if swelling, weight gain, or other symptoms come on quickly or are severe. Some cases of preeclampsia are more severe than others. Your symptoms also may change or get worse as you get closer to your due date.  Who’s at risk?  No one knows what causes preeclampsia. Preeclampsia can happen in any pregnant woman. But it is more common in first-time pregnancies. Things that increase the risk include:  Previous pregnancies. You are at risk if you had preeclampsia, intrauterine growth retardation (IUGR),  birth, placental abruption, or fetal death in a past pregnancy.  Health history of mother. You are at risk if you have diabetes, high blood  pressure, obesity, kidney disease, autoimmune disease such as lupus, or a family history of preeclampsia.  Current pregnancy. You are at risk if this is your first pregnancy, or if you have multiple fetuses, are younger than age 18 or older than 40, or used in vitro fertilization.  Race. You are at risk if you are black.  Dangers of preeclampsia  If not treated, preeclampsia can cause problems for you and your baby. The placenta is the organ that nourishes your baby. It may tear away from the uterine wall. This can put the baby at risk for health problems (fetal distress) and premature birth. Preeclampsia can also cause these health problems:  Kidney failure or other organ damage  Seizures  Stroke  Once you give birth  In most cases, preeclampsia goes away on its own soon after you give birth. This is often by the 12th week after you give deliver. Within days of delivery, your blood pressure, swelling, and other symptoms should get better. For some women, problems from preeclampsia can continue after delivery.  Postpartum preeclampsia that develops within the first 48 hours after delivery is rare. Another type of postpartum preeclampsia that develops more than 48 hours after delivery is called late-onset preeclampsia. It is also rare. Contact your healthcare provider right away if you have symptoms of preeclampsia after you deliver.  [employer] last reviewed this educational content on 12/1/2019  © 8205-9223 The [employer]. [address] [place] [address], [place] [address]. All rights reserved. This information is not intended as a substitute for professional medical care. Always follow your healthcare professional's instructions.        Kick Counts    It’s normal to worry about your baby’s health. One way you can know your baby’s doing well is to record the baby’s movements once a day. This is called a kick count. Remember to take your kick count records to all your appointments with your healthcare provider.  How  to count kicks  Time how long it takes you to feel 10 kicks, flutters, swishes, or rolls. Ideally, you want to feel at least 10 movements within 2 hours. You will likely feel 10 movements in less time than that.  Starting at 28 weeks, count your baby's movements daily. Follow your healthcare provider's instructions for kick counting. Here are tips for counting kicks:  Choose a time when the baby is active, such as after a meal.   Sit comfortably or lie on your side.   The first time the baby moves, write down the time.   Count each movement until the baby has moved 10 times. This can take from 20 minutes to 2 hours.   If you have not felt 10 kicks by the end of the second hour, wait a few hours. Then try again.  Try to do it at the same time each day.  When to call your healthcare provider  Call your healthcare provider right away if:  You do a couple sets of kick counts during the day and your baby moves fewer than 10 times in 2 hours  Your baby moves much less often than on the days before.  You have not felt your baby move all day.  [employer] last reviewed this educational content on 12/1/2017  © 4180-8574 The [employer], [facility]. [address] [place] [address], [place] [address]. All rights reserved. This information is not intended as a substitute for professional medical care. Always follow your healthcare professional's instructions.

## 2024-08-07 NOTE — PROGRESS NOTES
Nanette, , is at 32w4d, here for her [facility] visit.  Currently, she is feeling well. Denies 3rd trimester danger signs. Requests repeat TSH with repeat HepB screening. Will complete at 36wga.  Also, requests to order breast pump now.  Has growth scan today.    Vital signs and weight reviewed  See flowsheets     Assessment/Plan: HepB and TSH ordered for 36wga. Breast pump ordered  Measuring size larger than dates today but growth scan today  Next visit: 2 weeks    Reviewed:   Prenatal visit schedule   labor precautions  Kick counts  Danger signs    Pt verbalized understanding. All questions answered. No barriers to learning identified

## 2024-08-08 ENCOUNTER — HOSPITAL ENCOUNTER (OUTPATIENT)
Dept: PERINATAL CARE | Facility: HOSPITAL | Age: 29
Discharge: HOME | End: 2024-08-08
Attending: OBSTETRICS & GYNECOLOGY
Payer: COMMERCIAL

## 2024-08-08 VITALS
HEART RATE: 89 BPM | DIASTOLIC BLOOD PRESSURE: 67 MMHG | BODY MASS INDEX: 24 KG/M2 | SYSTOLIC BLOOD PRESSURE: 108 MMHG | WEIGHT: 129 LBS

## 2024-08-08 DIAGNOSIS — E03.9 HYPOTHYROID IN PREGNANCY, ANTEPARTUM, THIRD TRIMESTER (HCC): ICD-10-CM

## 2024-08-08 DIAGNOSIS — E03.9 ACQUIRED HYPOTHYROIDISM: ICD-10-CM

## 2024-08-08 DIAGNOSIS — O99.283 HYPOTHYROID IN PREGNANCY, ANTEPARTUM, THIRD TRIMESTER (HCC): ICD-10-CM

## 2024-08-08 DIAGNOSIS — E03.9 ACQUIRED HYPOTHYROIDISM: Primary | ICD-10-CM

## 2024-08-08 PROCEDURE — 76816 OB US FOLLOW-UP PER FETUS: CPT | Performed by: OBSTETRICS & GYNECOLOGY

## 2024-08-08 PROCEDURE — 76819 FETAL BIOPHYS PROFIL W/O NST: CPT

## 2024-08-08 NOTE — PROGRESS NOTES
Patient has a runny nose and a cough, some production is green and chills. This started 2 weeks ago.  She does not have shortness of breath.  Patient was unable to come in today.  She tried calling in but got put on hold after the prompts and never could get to the .   She will come in Friday.  If she feels worse and cant wait she can go to Urgent care tomorrow or if she becomes SOB she is to go to the ED to be evaluated.  She is in agreement with this plan. Kala SULLIVAN is aware of conversation.    Pt for Growth US  Denies pregnancy complaints  States active fetus

## 2024-08-08 NOTE — PROGRESS NOTES
Outpatient Maternal-Fetal Medicine Follow-Up     Dear Ms. Hernadez,     Thank you for requesting ultrasound evaluation and maternal fetal medicine consultation on your patient Nanette aWyne.  As you are aware she is a 29 year old female  with a Augustin pregnancy and an Estimated Date of Delivery: 24.  She returned to maternal-fetal medicine today for a follow-up visit.  Her history was reviewed from her prior visit and there were no interval changes.    We reviewed that she passed her gestational diabetes screening.     Antepartum Risk Factors  Hypothyroidism, controlled      S: She has no questions or concerns.  She reports good fetal movement.  PHYSICAL EXAMINATION:  /67   Pulse 89   Wt 129 lb (58.5 kg)   LMP 2023 (Within Days)   BMI 24.37 kg/m²   General: alert and oriented, no acute distress  Abdomen: gravid, soft, non-tender  Extremities: non-tender, no edema     OBSTETRIC ULTRASOUND  The patient had a follow-up fetal ultrasound today which I interpreted the results and reviewed them with the patient.    Ultrasound Findings:  Single IUP in cephalic presentation.    Placenta is anterior.   A 3 vessel cord is noted.  Cardiac activity is present at 161 bpm  EFW 2083 g ( 4 lb 9 oz); 50%.    SUAD is  12.4 cm.  MVP is 5 cm  BPP is 8/8.     The fetal measurements are consistent with established EDC. No gross ultrasound evidence of structural abnormalities are seen today. The patient understands that ultrasound cannot rule out all structural and chromosomal abnormalities.     See imaging tab for the complete US report.     DISCUSSION  During her visit we discussed and reviewed the following issues:  Hypothyroidism and its potential implications and goals of treatment in pregnancy were discussed previously and reviewed.  See [facility] note from 2024 for detailed review  Thyroid:    Lab Results   Component Value Date    TSH 1.676 2024    TSH 2.162 2024    TSH 3.072 2024     T4F 1.0 2024    T4F 0.8 01/10/2022    T4F 1.0 2019       We reviewed the signs and symptoms of preeclampsia,  labor and monitoring fetal movement.  We discussed reasons for her to call her physician.      We discussed the recommended plan of care based on her  risk factors.  Nanette and her significant other, Clinton, had their questions answered to their satisfaction.      IMPRESSION:  IUP at 32w5d; cephalic  Normal fetal growth and  testing  Hypothyroidism, controlled    RECOMMENDATIONS:  Continue care with Ms. Hernadez  Monitor TSH every 8 to 12 weeks or more frequently if her dose requires adjustment       Total time spent 20 minutes this calendar day which includes preparing to see the patient including chart review, obtaining and/or reviewing additional medical history, performing a physical exam and evaluation, documenting clinical information in the electronic medical record, independently interpreting results, counseling the patient, communicating results to the patient/family/caregiver and coordinating care.     Case discussed with patient who demonstrated understanding and agreement with plan.     Thank you for allowing me to participate in the care of this patient.  Please feel free to contact me with any questions.    Teresa Araiza MD  Maternal-Fetal Medicine       Note to patient and family:  The 21st Century Cures Act makes medical notes available to patients in the interest of transparency.  However, please be advised that this is a medical document.  It is intended as a peer to peer communication.  It is written in medical language and may contain abbreviations or verbiage that are technical and unfamiliar.  It may appear blunt or direct.  Medical documents are intended to carry relevant information, facts as evident, and the clinical opinion of the practitioner.

## 2024-08-16 ENCOUNTER — TELEPHONE (OUTPATIENT)
Dept: FAMILY MEDICINE CLINIC | Facility: CLINIC | Age: 29
End: 2024-08-16

## 2024-08-16 NOTE — TELEPHONE ENCOUNTER
Pharmacy requesting refill      levothyroxine 25 MCG Oral Tab, Take 1 tablet (25 mcg total) by mouth before breakfast., Disp: 90 tablet, Rfl: 3

## 2024-08-22 ENCOUNTER — TELEPHONE (OUTPATIENT)
Dept: OBGYN CLINIC | Facility: CLINIC | Age: 29
End: 2024-08-22

## 2024-08-22 ENCOUNTER — ROUTINE PRENATAL (OUTPATIENT)
Dept: OBGYN CLINIC | Facility: CLINIC | Age: 29
End: 2024-08-22

## 2024-08-22 VITALS
SYSTOLIC BLOOD PRESSURE: 105 MMHG | DIASTOLIC BLOOD PRESSURE: 68 MMHG | HEART RATE: 91 BPM | BODY MASS INDEX: 25 KG/M2 | WEIGHT: 131.38 LBS

## 2024-08-22 DIAGNOSIS — Z34.83 PRENATAL CARE, SUBSEQUENT PREGNANCY IN THIRD TRIMESTER (HCC): Primary | ICD-10-CM

## 2024-08-22 DIAGNOSIS — E03.9 ACQUIRED HYPOTHYROIDISM: ICD-10-CM

## 2024-08-22 NOTE — PROGRESS NOTES
Active baby. No complaints. Reviewed danger signs. Normal growth ultrasound 8/8 EFW 50%. Has thyroid labs ordered.

## 2024-08-22 NOTE — TELEPHONE ENCOUNTER
Family Medical Leave Act forms received via email, no Release of Information attached, sending [email or web address] to obtain, logged for processing.

## 2024-08-26 NOTE — TELEPHONE ENCOUNTER
Yael Maricarmen [facility],     *The ACKNOWLEDGE button has been moved to the top right ribbon*    Please sign off on form if you agree to:  Family Medical Leave Act (maternity leave), start date: on or near NEPTALI 24, end date: 6 weeks vaginal/8 weeks  (patient may take up to 12 weeks blocked time)   (place your signature on the first page only)    -From your Inbasket, Highlight the patient and click Chart   -Double click the 24 Forms Completion telephone encounter  -Scroll down to the Media section   -Click the blue Hyperlink:  Family Medical Leave Act MARIANNE Hernadez RAYNA 24  -Click Acknowledge located in the top right ribbon/menu   -Drag the mouse into the blank space of the document and a + sign will appear. Left click to   electronically sign the document.     Thank you,    Neeru

## 2024-09-03 ENCOUNTER — LAB ENCOUNTER (OUTPATIENT)
Dept: LAB | Facility: REFERENCE LAB | Age: 29
End: 2024-09-03
Attending: ADVANCED PRACTICE MIDWIFE
Payer: COMMERCIAL

## 2024-09-03 ENCOUNTER — ROUTINE PRENATAL (OUTPATIENT)
Dept: OBGYN CLINIC | Facility: CLINIC | Age: 29
End: 2024-09-03

## 2024-09-03 VITALS
WEIGHT: 133 LBS | HEART RATE: 87 BPM | BODY MASS INDEX: 25 KG/M2 | DIASTOLIC BLOOD PRESSURE: 64 MMHG | SYSTOLIC BLOOD PRESSURE: 99 MMHG

## 2024-09-03 DIAGNOSIS — E03.9 ACQUIRED HYPOTHYROIDISM: ICD-10-CM

## 2024-09-03 DIAGNOSIS — Z34.93 PRENATAL CARE IN THIRD TRIMESTER (HCC): Primary | ICD-10-CM

## 2024-09-03 DIAGNOSIS — Z36.85 ANTENATAL SCREENING FOR STREPTOCOCCUS B (HCC): ICD-10-CM

## 2024-09-03 DIAGNOSIS — Z20.2 EXPOSURE TO STD: ICD-10-CM

## 2024-09-03 LAB
HBV SURFACE AG SER-ACNC: 0.21 [IU]/L
HBV SURFACE AG SERPL QL IA: NONREACTIVE
TSI SER-ACNC: 3.97 MIU/ML (ref 0.55–4.78)

## 2024-09-03 PROCEDURE — 84443 ASSAY THYROID STIM HORMONE: CPT

## 2024-09-03 PROCEDURE — 87340 HEPATITIS B SURFACE AG IA: CPT

## 2024-09-03 PROCEDURE — 36415 COLL VENOUS BLD VENIPUNCTURE: CPT

## 2024-09-03 NOTE — TELEPHONE ENCOUNTER
Family Medical Leave Act forms completed & e-faxed to [facility] at # [phone].    Awaiting fax confirmation.

## 2024-09-03 NOTE — PROGRESS NOTES
Patient's last menstrual period was 2023 (within days). 2024, by Ultrasound 36w3d here today for TAHIRA visit. Baby active. Denies contractions, LOF or bleeding.     GBS collected today.   Had TSH & Hep B drawn today, results pending      ICD-10-CM    1. Prenatal care in third trimester ([facility])  Z34.93       2.  screening for streptococcus B ([facility])  Z36.85         SOL and warning signs reviewed. 36 wk packet given. To review birth plan at [facility]

## 2024-09-03 NOTE — PATIENT INSTRUCTIONS
Kick Counts  It’s normal to worry about your baby’s health. One way you can know your baby’s doing well is to record the baby’s movements once a day. This is called a kick count.   Remember to take your kick count records to all your appointments with your healthcare provider.  How to count kicks    Time how long it takes you to feel 10 kicks, flutters, swishes, or rolls. Ideally, you want to feel at least 10 movements in 2 hours. You will likely feel 10 movements in less time than that.  Starting at 28 weeks, count your baby's movements daily. Follow your healthcare provider's instructions for kick counting. Here are tips for counting kicks:  Choose a time when the baby is active, such as after a meal.   Sit comfortably or lie on your side.   The first time the baby moves, write down the time.   Count each movement until the baby has moved  10 times. This can take from 20 minutes to 2 hours.   If you haven't felt 10 kicks by the end of the second hour, wait a few hours. Then try again.  Try to do it at the same time each day.  When to call your healthcare provider  Call your healthcare provider  right away if:  You do a couple sets of kick counts during the day and your baby moves fewer than 10 times in 2 hours.  Your baby moves much less often than on the days before.  You haven't felt your baby move all day.  [employer] last reviewed this educational content on 2020    © 6871-5511 [employer]. All rights reserved. This information is not intended as a substitute for professional medical care. Always follow your healthcare professional's instructions. Premature Labor    Premature labor ( labor) is when symptoms of labor occur before 37 weeks of pregnancy. (This is 3 weeks before your due date.) Premature labor can lead to premature delivery. This means giving birth to your baby early. Babies need at least 37 weeks of pregnancy for all the organs to develop normally. The earlier the  delivery, the greater the risks to the baby.  In most cases, the cause of premature labor is unknown. But certain factors may make the problem more likely. These include:  History of premature labor with other pregnancies  Smoking  Alcohol or substance abuse  Low pre-pregnancy weight or weight gain during pregnancy  Short time period between pregnancies  Being pregnant with twins, triplets, or more  History of certain types of surgery on the cervix or uterus  Having a short cervix  Certain infections  There are a number of other risk factors. Ask your healthcare provider to help you understand the risk factors specific to your case. Then find out what you can do to control or reduce them.  Contractions are one of the main signs of premature labor. A contraction is different from cramping. It may feel painful and the belly (abdomen) may get hard. It can last from a few seconds to a few minutes. Some women may feel only a sense of pressure in the belly, thighs, rectum, or vagina. Some may feel only the hardening of the uterus without pain or pressure. Or there may be a constant pain in the lower back, which spreads forward toward the belly.Premature labor is often treated with medicines. A hospital stay may be needed. If labor doesn't progress and you and your baby are both healthy, you may be discharged to continue care at home.  Home care  Ask your provider any questions you have. Be certain you understand how to care for yourself at home. Also follow all recommendations given by your healthcare providers.  Learn the signs of premature labor. Watch for these signs when you get home.  Limit or restrict activities as advised. This may include stopping certain physical activities and cutting back hours at work.  Avoid doing strenuous work as directed by your provider. Ask family and friends for help with tasks and support at home, if needed.  Don’t smoke, drink alcohol, or use other harmful substances.  Take steps to  reduce stress.  Report any unusual symptoms to your provider.    Follow-up care  Follow up with your healthcare provider, or as directed. Weekly visits with your provider may be needed.  When to seek medical advice  Call your healthcare provider right away if any of these occur:  Regular or frequent contractions, whether they are painful or not  Pressure in the pelvis  Pressure in the lower belly or mild cramping in your belly with or without diarrhea  Constant low, dull backache  Gush or slow leaking of water from your vagina  Change in vaginal discharge (watery, mucus, or bloody)  Any vaginal bleeding  Decreased movement of your baby  Manny last reviewed this educational content on 2018 [employer]. All rights reserved. This information is not intended as a substitute for professional medical care. Always follow your healthcare professional's instructions.     Understanding Preeclampsia  Preeclampsia is a condition that can happen in pregnancy. It includes high blood pressure (hypertension), swelling, and signs of organ problems. It can show up around week 20 of pregnancy. It often goes away by 12 weeks after you give birth. It can lead to serious health risks for you and your baby. During your pregnancy, your healthcare provider will watch your blood pressure.      Your blood pressure will be monitored regularly throughout your pregnancy to help check for preeclampsia.     Dangers of preeclampsia   If not treated, preeclampsia can cause problems for you and your baby. The placenta is the organ that nourishes your baby. It may tear away from the wall of the uterus. This can put the baby at risk for health problems (fetal distress). It can put the baby at risk for  birth. Preeclampsia can also cause these health problems in you:   Kidney failure or other organ damage  Seizures  Stroke  Who’s at risk for preeclampsia?   No one knows what causes preeclampsia. It can happen  in any pregnant person. But there are things that increase your risk. You may need to take a daily low dose of aspirin if you are at risk for preeclampsia.   You’re at higher risk for preeclampsia if you have any of these:  Diabetes  High blood pressure  Obesity  Kidney disease  Autoimmune disease such as lupus  A family history of preeclampsia  You’re at higher risk if any of these apply to you:  This is your first pregnancy  You are having twins or more  You’re under age 18 or over age 40  You used in vitro fertilization  You are Black  And you’re at higher risk if you had any of these in a past pregnancy:   Preeclampsia  Intrauterine growth retardation (IUGR)   birth  Placental abruption  Fetal death  Symptoms  A common symptom of preeclampsia is high blood pressure. Other symptoms may include:   Fast weight gain  Protein in your urine  Headache  Belly (abdominal) pain on your right side  Vision problems such as flashes or spots  Swelling (edema) in your face or hands (this often happens near the end of a normal pregnancy)  Tests you may have   Your healthcare provider will want to check your blood pressure. This will need to be done often in your pregnancy. If your blood pressure is high, you may have these tests:   Urine tests to look for protein  Blood tests to confirm preeclampsia  Fetal monitoring to make sure that your baby is healthy  Treating preeclampsia   Preeclampsia almost always ends soon after you give birth. Until then, your healthcare provider can help you manage it.   If your symptoms are mild, you may to:     Limit your activity  Rest in bed  Not do heavy lifting    If your symptoms are severe, you will stay in the hospital. Treatment here may include:   Limits to your activity. This is to help control your blood pressure. You should not lift anything heavy. You will need to spend 8 hours a day lying down with your feet up.  Magnesium IV (intravenous) drip. This is done during labor. It's  to prevent seizures  Induced labor or  section. Birth of the baby is considered the cure for preeclampsia.  When to call your healthcare provider   Call your healthcare provider if your symptoms start quickly or are severe. This includes swelling, weight gain, or other symptoms. Some cases of preeclampsia are more severe than others. Your symptoms also may change or get worse as you get closer to your due date.   Once you give birth   In most cases, preeclampsia goes away on its own soon after you give birth. This is often by the 12th week after you deliver. Within days after you give birth, your blood pressure, swelling, and other symptoms should get better. But for some people, problems from preeclampsia can continue after birth.   Postpartum preeclampsia   Preeclampsia that starts after birth is rare. There are 2 types:   Postpartum preeclampsia. This may start in the first 48 hours after birth.  Late-onset preeclampsia. This starts more than 48 hours after birth.  Both of these types are rare. But call your healthcare provider right away if you have symptoms of preeclampsia after you give birth.   Manny last reviewed this educational content on 10/1/2021    © 0612-7243 [employer]. All rights reserved. This information is not intended as a substitute for professional medical care. Always follow your healthcare professional's instructions.

## 2024-09-04 LAB — GROUP B STREP BY PCR FOR PCR OVT: NEGATIVE

## 2024-09-09 ENCOUNTER — ROUTINE PRENATAL (OUTPATIENT)
Dept: OBGYN CLINIC | Facility: CLINIC | Age: 29
End: 2024-09-09

## 2024-09-09 VITALS
SYSTOLIC BLOOD PRESSURE: 108 MMHG | HEART RATE: 82 BPM | DIASTOLIC BLOOD PRESSURE: 64 MMHG | WEIGHT: 134.81 LBS | BODY MASS INDEX: 25 KG/M2

## 2024-09-09 DIAGNOSIS — Z34.93 PRENATAL CARE IN THIRD TRIMESTER (HCC): Primary | ICD-10-CM

## 2024-09-20 ENCOUNTER — ROUTINE PRENATAL (OUTPATIENT)
Dept: OBGYN CLINIC | Facility: CLINIC | Age: 29
End: 2024-09-20

## 2024-09-20 VITALS
WEIGHT: 138 LBS | HEART RATE: 88 BPM | SYSTOLIC BLOOD PRESSURE: 101 MMHG | DIASTOLIC BLOOD PRESSURE: 66 MMHG | BODY MASS INDEX: 26 KG/M2

## 2024-09-20 DIAGNOSIS — Z34.83 PRENATAL CARE, SUBSEQUENT PREGNANCY IN THIRD TRIMESTER (HCC): Primary | ICD-10-CM

## 2024-09-20 PROBLEM — Z34.90 PREGNANCY (HCC): Status: ACTIVE | Noted: 2024-09-20

## 2024-09-20 NOTE — PROGRESS NOTES
Patient seen in conjunction with [facility]. I personally witnessed the patient's exam and medical decision making on this date of service.  I was physically present in the room for the performance of the E/M service.  I have reviewed the CNM student's documentation and findings including history, Exam, and Medical Decision Making, edited the document for accuracy and verify that it represents the clinical findings and services performed.

## 2024-09-20 NOTE — PATIENT INSTRUCTIONS
Understanding Preeclampsia  Preeclampsia is high blood pressure (hypertension) that happens during pregnancy. It often shows up around the 20th week of pregnancy. It often goes back to normal by the 12th week after you give birth. It can lead to serious health risks for you and your baby. During your pregnancy, your healthcare provider will watch your blood pressure.    Symptoms  A common symptom of preeclampsia is high blood pressure. Other symptoms may include:  Rapid weight gain  Protein in your urine  Headache  Belly (abdominal) pain on your right side  Vision problems. These include flashes or spots.  Swelling (edema) in your face or hands. This also often happens near the end of normal pregnancies, even without preeclampsia.  Tests you may have  Your healthcare provider will want to check your blood pressure throughout your pregnancy. If your blood pressure is high, you may have the following tests:  Urine tests to look for protein  Blood tests to confirm preeclampsia  Fetal monitoring to make sure that your baby is healthy  Treating preeclampsia  You may need to take a daily low dose of aspirin if you are at risk for preeclampsia. Preeclampsia almost always ends soon after you give birth. Until then, your healthcare provider can help manage your condition. If your symptoms are mild, you may need activity limits at home, including bed rest and no heavy lifting. If your symptoms are severe, you will stay in the hospital. Hospital treatment includes:  Activity limits to help control blood pressure. This means no heavy lifting and 8 hours per day lying down with the feet up.  Magnesium IV (intravenous) drip during labor to prevent seizures  Induced labor or surgical delivery by  section. Delivery is considered the cure for preeclampsia.  When to call your healthcare provider  Call your healthcare provider if swelling, weight gain, or other symptoms come on quickly or are severe. Some cases of  preeclampsia are more severe than others. Your symptoms also may change or get worse as you get closer to your due date.  Who’s at risk?  No one knows what causes preeclampsia. Preeclampsia can happen in any pregnant woman. But it is more common in first-time pregnancies. Things that increase the risk include:  Previous pregnancies. You are at risk if you had preeclampsia, intrauterine growth retardation (IUGR),  birth, placental abruption, or fetal death in a past pregnancy.  Health history of mother. You are at risk if you have diabetes, high blood pressure, obesity, kidney disease, autoimmune disease such as lupus, or a family history of preeclampsia.  Current pregnancy. You are at risk if this is your first pregnancy, or if you have multiple fetuses, are younger than age 18 or older than 40, or used in vitro fertilization.  Race. You are at risk if you are black.  Dangers of preeclampsia  If not treated, preeclampsia can cause problems for you and your baby. The placenta is the organ that nourishes your baby. It may tear away from the uterine wall. This can put the baby at risk for health problems (fetal distress) and premature birth. Preeclampsia can also cause these health problems:  Kidney failure or other organ damage  Seizures  Stroke  Once you give birth  In most cases, preeclampsia goes away on its own soon after you give birth. This is often by the 12th week after you give deliver. Within days of delivery, your blood pressure, swelling, and other symptoms should get better. For some women, problems from preeclampsia can continue after delivery.  Postpartum preeclampsia that develops within the first 48 hours after delivery is rare. Another type of postpartum preeclampsia that develops more than 48 hours after delivery is called late-onset preeclampsia. It is also rare. Contact your healthcare provider right away if you have symptoms of preeclampsia after you deliver.  Manny last reviewed this  educational content on 12/1/2019 © 2000-2020 [employer]. [address] [place] [id number]. All rights reserved. This information is not intended as a substitute for professional medical care. Always follow your healthcare professional's instructions.        Kick Counts    It’s normal to worry about your baby’s health. One way you can know your baby’s doing well is to record the baby’s movements once a day. This is called a kick count. Remember to take your kick count records to all your appointments with your healthcare provider.  How to count kicks  Time how long it takes you to feel 10 kicks, flutters, swishes, or rolls. Ideally, you want to feel at least 10 movements within 2 hours. You will likely feel 10 movements in less time than that.  Starting at 28 weeks, count your baby's movements daily. Follow your healthcare provider's instructions for kick counting. Here are tips for counting kicks:  Choose a time when the baby is active, such as after a meal.   Sit comfortably or lie on your side.   The first time the baby moves, write down the time.   Count each movement until the baby has moved 10 times. This can take from 20 minutes to 2 hours.   If you have not felt 10 kicks by the end of the second hour, wait a few hours. Then try again.  Try to do it at the same time each day.  When to call your healthcare provider  Call your healthcare provider right away if:  You do a couple sets of kick counts during the day and your baby moves fewer than 10 times in 2 hours  Your baby moves much less often than on the days before.  You have not felt your baby move all day.  [employer] last reviewed this educational content on 12/1/2017 © 2000-2020 [employer]. [address] [place] [address]. All rights reserved. This information is not intended as a substitute for professional medical care. Always follow your healthcare professional's instructions.        Recognizing  Labor    The beginning of labor is the beginning of birth. You’ll start to feel strong contractions. That’s when the muscles of your uterus tighten up to help push your baby out during birth.  Yes, labor has probably started   Signs of labor include:  Your contractions are getting stronger and more painful instead of weaker. You’ll probably feel them throughout your whole uterus.  Your contractions are regular. This means that you feel them about every 5 to 10 minutes. And they are getting closer together.  You have pink-colored or blood-streaked fluid from your vagina.  You feel that the baby has \"dropped\" lower in your pelvis   Your water breaks. It may be a gush or a slow trickle of clear fluid from your vagina.  No, it’s probably not real labor   Signs of false labor include:  Your contractions aren’t regular or strong.  You feel the contractions only in your lower uterus.  Your contractions go away when you walk or change position.  Your contractions go away after drinking fluids.  When to call your healthcare provider  Call your healthcare provider or clinic right away if you notice any of these signs:  Fluid from your vagina, with or without contractions.  Bleeding heavy enough that you need a sanitary pad.  You don’t feel your baby moving as much as before.     NOTE: Contractions are timed by both of these measures:  The length of each contraction from its start to its finish.  How far apart the contractions are--the time between the start of one contraction and the start of the next contraction.   [employer] last reviewed this educational content on 10/1/2017  © 4808-6848 [employer]. [address] [place] [address], [place] [address]. All rights reserved. This information is not intended as a substitute for professional medical care. Always follow your healthcare professional's instructions.        Stages of Labor    Labor has 3 stages. Your healthcare provider may talk about the progress of your labor  with certain words. One of these is your baby’s position. Another is your baby’s station. And the effacement and dilation of your cervix will be noted. Read below to learn about these terms and the 3 stages of labor.  Your baby moves into position  Position is your baby’s placement in your uterus. Your baby may be facing left or right. He or she may be head first or feet first. Station refers to how far your baby has moved down into your pelvic cavity.  First stage of labor  During the first stage of labor, contractions of the uterus help your cervix thin (efface). They also help it widen (dilate). This will help your baby pass through the birth canal (vagina). At first your contractions will not come that often or last that long. But as time passes, they will come more often, they may be more painful, and they will last longer. They will last about 30 to 60 seconds each. The first stage of labor lasts until the cervix is fully dilated.  Second stage of labor  When your cervix is fully dilated, the second stage of labor begins. In this stage, you will have stronger contractions of your uterus that will help your baby move down the birth canal. They may happen every 2 to 5 minutes. They may last from 45 to 90 seconds each. Your healthcare provider will ask you to push with each contraction. Try to rest between the contractions if you can. Your baby is delivered at the end of this stage of labor.  Third stage of labor  The third stage of labor comes after your baby is born. This is when the afterbirth (placenta) comes out of your uterus. Your uterus will continue to contract. But the contractions are much milder than before.  [employer] last reviewed this educational content on 10/1/2017  © 1444-6675 [employer]. [address] [place] [address], [place] [address]. All rights reserved. This information is not intended as a substitute for professional medical care. Always follow your healthcare professional's  instructions.

## 2024-09-20 NOTE — PROGRESS NOTES
Nanette, , is at 38w6d, here for her TAHIRA visit.  Currently, she is feeling well. Denies 3rd trimester danger signs.     Birth plan reviewed:    Prior birth experiences/outcomes: first birth went smoothly  bASA: no  Support:  ([place])  Pain management: unmedicated  Vitamin K: Yes  Erythromycin ointment: Yes  Placenta: No  Circumcision: Boy --yes  Peds: Dr. Lisa Redding   Feeding method: Breastfeeding   Housing/car seat: stable/ yes  Contraception: defer to 6 week appt, would like pills    Vital signs and weight reviewed  See flowsheets      Assessment/Plan: Care up to date  Next visit: 1 week    Reviewed:   Prenatal visit schedule  Emergency contact info and safe use of messaging in MyChart  3rd trimester precautions and expectations   labor precautions  Kick counts  Danger signs    Pt verbalized understanding. All questions answered. No barriers to learning identified    LEANN Diaz under direct supervision of Ashley Young CNM    Patient seen in conjunction with [facility]. I personally witnessed the patient's exam and medical decision making on this date of service.  I was physically present in the room for the performance of the E/M service.  I have reviewed the RAYNA student's documentation and findings including history, Exam, and Medical Decision Making, edited the document for accuracy and verify that it represents the clinical findings and services performed.

## 2024-09-27 ENCOUNTER — ROUTINE PRENATAL (OUTPATIENT)
Dept: OBGYN CLINIC | Facility: CLINIC | Age: 29
End: 2024-09-27

## 2024-09-27 VITALS
BODY MASS INDEX: 26 KG/M2 | SYSTOLIC BLOOD PRESSURE: 106 MMHG | HEART RATE: 91 BPM | DIASTOLIC BLOOD PRESSURE: 67 MMHG | WEIGHT: 140 LBS

## 2024-09-27 DIAGNOSIS — Z34.83 PRENATAL CARE, SUBSEQUENT PREGNANCY IN THIRD TRIMESTER (HCC): Primary | ICD-10-CM

## 2024-09-27 NOTE — PROGRESS NOTES
Patient seen in conjunction with [facility]. I personally witnessed the patient's exam and medical decision making on this date of service.  I was physically present in the room for the performance of the E/M service.  I have reviewed the CNM student's documentation and findings including history, Exam, and Medical Decision Making, edited the document for accuracy and verify that it represents the clinical findings and services performed.

## 2024-09-27 NOTE — PROGRESS NOTES
Nanette, , is at 39w6d, here for her TAHIRA visit.  Currently, she is feeling well. Denies 3rd trimester danger signs. Requests SVE and wants membrane sweep after review of risks/benefits.    Vital signs and weight reviewed  See flowsheets  Prenatal Physical  Flu Shot: declined   Cervical exam: /3    Assessment/Plan: Membranes swept. Will call if is she has contractions q5 for 30 minutes, [place]   Next visit: 1 week    Reviewed:   Prenatal visit schedule  Emergency contact info and safe use of messaging in MyChart  3rd trimester precautions and expectations  Kick counts  Danger signs  Labor precautions  Current L&D policies:   Three visitors plus   Nitrous available    I have spent 20 minutes total time on the day of the encounter, including: preparing to see the patient, ordering/reviewing labs, performing a medically appropriate exam, and providing care coordination. face to face counseling, chart review, orders and coordination of care    Pt verbalized understanding. All questions answered. No barriers to learning identified    LEANN Diaz under direct supervision of Ashley Young CNM     Patient seen in conjunction with [facility]. I personally witnessed the patient's exam and medical decision making on this date of service.  I was physically present in the room for the performance of the E/M service.  I have reviewed the RAYNA student's documentation and findings including history, Exam, and Medical Decision Making, edited the document for accuracy and verify that it represents the clinical findings and services performed.

## 2024-09-27 NOTE — PATIENT INSTRUCTIONS
Understanding Preeclampsia  Preeclampsia is high blood pressure (hypertension) that happens during pregnancy. It often shows up around the 20th week of pregnancy. It often goes back to normal by the 12th week after you give birth. It can lead to serious health risks for you and your baby. During your pregnancy, your healthcare provider will watch your blood pressure.    Symptoms  A common symptom of preeclampsia is high blood pressure. Other symptoms may include:  Rapid weight gain  Protein in your urine  Headache  Belly (abdominal) pain on your right side  Vision problems. These include flashes or spots.  Swelling (edema) in your face or hands. This also often happens near the end of normal pregnancies, even without preeclampsia.  Tests you may have  Your healthcare provider will want to check your blood pressure throughout your pregnancy. If your blood pressure is high, you may have the following tests:  Urine tests to look for protein  Blood tests to confirm preeclampsia  Fetal monitoring to make sure that your baby is healthy  Treating preeclampsia  You may need to take a daily low dose of aspirin if you are at risk for preeclampsia. Preeclampsia almost always ends soon after you give birth. Until then, your healthcare provider can help manage your condition. If your symptoms are mild, you may need activity limits at home, including bed rest and no heavy lifting. If your symptoms are severe, you will stay in the hospital. Hospital treatment includes:  Activity limits to help control blood pressure. This means no heavy lifting and 8 hours per day lying down with the feet up.  Magnesium IV (intravenous) drip during labor to prevent seizures  Induced labor or surgical delivery by  section. Delivery is considered the cure for preeclampsia.  When to call your healthcare provider  Call your healthcare provider if swelling, weight gain, or other symptoms come on quickly or are severe. Some cases of  preeclampsia are more severe than others. Your symptoms also may change or get worse as you get closer to your due date.  Who’s at risk?  No one knows what causes preeclampsia. Preeclampsia can happen in any pregnant woman. But it is more common in first-time pregnancies. Things that increase the risk include:  Previous pregnancies. You are at risk if you had preeclampsia, intrauterine growth retardation (IUGR),  birth, placental abruption, or fetal death in a past pregnancy.  Health history of mother. You are at risk if you have diabetes, high blood pressure, obesity, kidney disease, autoimmune disease such as lupus, or a family history of preeclampsia.  Current pregnancy. You are at risk if this is your first pregnancy, or if you have multiple fetuses, are younger than age 18 or older than 40, or used in vitro fertilization.  Race. You are at risk if you are black.  Dangers of preeclampsia  If not treated, preeclampsia can cause problems for you and your baby. The placenta is the organ that nourishes your baby. It may tear away from the uterine wall. This can put the baby at risk for health problems (fetal distress) and premature birth. Preeclampsia can also cause these health problems:  Kidney failure or other organ damage  Seizures  Stroke  Once you give birth  In most cases, preeclampsia goes away on its own soon after you give birth. This is often by the 12th week after you give deliver. Within days of delivery, your blood pressure, swelling, and other symptoms should get better. For some women, problems from preeclampsia can continue after delivery.  Postpartum preeclampsia that develops within the first 48 hours after delivery is rare. Another type of postpartum preeclampsia that develops more than 48 hours after delivery is called late-onset preeclampsia. It is also rare. Contact your healthcare provider right away if you have symptoms of preeclampsia after you deliver.  Manny last reviewed this  educational content on 12/1/2019 © 2000-2020 [employer]. [address] [place] [id number]. All rights reserved. This information is not intended as a substitute for professional medical care. Always follow your healthcare professional's instructions.        Kick Counts    It’s normal to worry about your baby’s health. One way you can know your baby’s doing well is to record the baby’s movements once a day. This is called a kick count. Remember to take your kick count records to all your appointments with your healthcare provider.  How to count kicks  Time how long it takes you to feel 10 kicks, flutters, swishes, or rolls. Ideally, you want to feel at least 10 movements within 2 hours. You will likely feel 10 movements in less time than that.  Starting at 28 weeks, count your baby's movements daily. Follow your healthcare provider's instructions for kick counting. Here are tips for counting kicks:  Choose a time when the baby is active, such as after a meal.   Sit comfortably or lie on your side.   The first time the baby moves, write down the time.   Count each movement until the baby has moved 10 times. This can take from 20 minutes to 2 hours.   If you have not felt 10 kicks by the end of the second hour, wait a few hours. Then try again.  Try to do it at the same time each day.  When to call your healthcare provider  Call your healthcare provider right away if:  You do a couple sets of kick counts during the day and your baby moves fewer than 10 times in 2 hours  Your baby moves much less often than on the days before.  You have not felt your baby move all day.  [employer] last reviewed this educational content on 12/1/2017 © 2000-2020 [employer]. [address] [place] [address]. All rights reserved. This information is not intended as a substitute for professional medical care. Always follow your healthcare professional's instructions.        Recognizing  Labor    The beginning of labor is the beginning of birth. You’ll start to feel strong contractions. That’s when the muscles of your uterus tighten up to help push your baby out during birth.  Yes, labor has probably started   Signs of labor include:  Your contractions are getting stronger and more painful instead of weaker. You’ll probably feel them throughout your whole uterus.  Your contractions are regular. This means that you feel them about every 5 to 10 minutes. And they are getting closer together.  You have pink-colored or blood-streaked fluid from your vagina.  You feel that the baby has \"dropped\" lower in your pelvis   Your water breaks. It may be a gush or a slow trickle of clear fluid from your vagina.  No, it’s probably not real labor   Signs of false labor include:  Your contractions aren’t regular or strong.  You feel the contractions only in your lower uterus.  Your contractions go away when you walk or change position.  Your contractions go away after drinking fluids.  When to call your healthcare provider  Call your healthcare provider or clinic right away if you notice any of these signs:  Fluid from your vagina, with or without contractions.  Bleeding heavy enough that you need a sanitary pad.  You don’t feel your baby moving as much as before.     NOTE: Contractions are timed by both of these measures:  The length of each contraction from its start to its finish.  How far apart the contractions are--the time between the start of one contraction and the start of the next contraction.   [employer] last reviewed this educational content on 10/1/2017  © 0479-5524 [employer]. [address] [place] [address], [place] [address]. All rights reserved. This information is not intended as a substitute for professional medical care. Always follow your healthcare professional's instructions.        Stages of Labor    Labor has 3 stages. Your healthcare provider may talk about the progress of your labor  with certain words. One of these is your baby’s position. Another is your baby’s station. And the effacement and dilation of your cervix will be noted. Read below to learn about these terms and the 3 stages of labor.  Your baby moves into position  Position is your baby’s placement in your uterus. Your baby may be facing left or right. He or she may be head first or feet first. Station refers to how far your baby has moved down into your pelvic cavity.  First stage of labor  During the first stage of labor, contractions of the uterus help your cervix thin (efface). They also help it widen (dilate). This will help your baby pass through the birth canal (vagina). At first your contractions will not come that often or last that long. But as time passes, they will come more often, they may be more painful, and they will last longer. They will last about 30 to 60 seconds each. The first stage of labor lasts until the cervix is fully dilated.  Second stage of labor  When your cervix is fully dilated, the second stage of labor begins. In this stage, you will have stronger contractions of your uterus that will help your baby move down the birth canal. They may happen every 2 to 5 minutes. They may last from 45 to 90 seconds each. Your healthcare provider will ask you to push with each contraction. Try to rest between the contractions if you can. Your baby is delivered at the end of this stage of labor.  Third stage of labor  The third stage of labor comes after your baby is born. This is when the afterbirth (placenta) comes out of your uterus. Your uterus will continue to contract. But the contractions are much milder than before.  [employer] last reviewed this educational content on 10/1/2017  © 0865-6990 [employer]. [address] [place] [address], [place] [address]. All rights reserved. This information is not intended as a substitute for professional medical care. Always follow your healthcare professional's  instructions.

## 2024-09-28 ENCOUNTER — ANESTHESIA EVENT (OUTPATIENT)
Dept: OBGYN UNIT | Facility: HOSPITAL | Age: 29
End: 2024-09-28
Payer: COMMERCIAL

## 2024-09-28 ENCOUNTER — ANESTHESIA (OUTPATIENT)
Dept: OBGYN UNIT | Facility: HOSPITAL | Age: 29
End: 2024-09-28
Payer: COMMERCIAL

## 2024-09-28 ENCOUNTER — HOSPITAL ENCOUNTER (INPATIENT)
Facility: HOSPITAL | Age: 29
LOS: 2 days | Discharge: HOME | End: 2024-09-30
Attending: ADVANCED PRACTICE MIDWIFE | Admitting: OBSTETRICS & GYNECOLOGY
Payer: COMMERCIAL

## 2024-09-28 PROBLEM — Z37.9 NORMAL LABOR (HCC): Status: ACTIVE | Noted: 2024-09-28

## 2024-09-28 LAB
ANTIBODY SCREEN: NEGATIVE
BASOPHILS # BLD AUTO: 0.02 X10(3) UL (ref 0–0.2)
BASOPHILS NFR BLD AUTO: 0.3 %
DEPRECATED RDW RBC AUTO: 44.1 FL (ref 35.1–46.3)
EOSINOPHIL # BLD AUTO: 0.04 X10(3) UL (ref 0–0.7)
EOSINOPHIL NFR BLD AUTO: 0.6 %
ERYTHROCYTE [DISTWIDTH] IN BLOOD BY AUTOMATED COUNT: 13.2 % (ref 11–15)
HCT VFR BLD AUTO: 36.7 %
HGB BLD-MCNC: 12.9 G/DL
IMM GRANULOCYTES # BLD AUTO: 0.08 X10(3) UL (ref 0–1)
IMM GRANULOCYTES NFR BLD: 1.2 %
LYMPHOCYTES # BLD AUTO: 1.32 X10(3) UL (ref 1–4)
LYMPHOCYTES NFR BLD AUTO: 19.5 %
MCH RBC QN AUTO: 32.3 PG (ref 26–34)
MCHC RBC AUTO-ENTMCNC: 35.1 G/DL (ref 31–37)
MCV RBC AUTO: 91.8 FL
MONOCYTES # BLD AUTO: 0.69 X10(3) UL (ref 0.1–1)
MONOCYTES NFR BLD AUTO: 10.2 %
NEUTROPHILS # BLD AUTO: 4.61 X10 (3) UL (ref 1.5–7.7)
NEUTROPHILS # BLD AUTO: 4.61 X10(3) UL (ref 1.5–7.7)
NEUTROPHILS NFR BLD AUTO: 68.2 %
PLATELET # BLD AUTO: 194 10(3)UL (ref 150–450)
RBC # BLD AUTO: 4 X10(6)UL
RH BLOOD TYPE: POSITIVE
T PALLIDUM AB SER QL IA: NONREACTIVE
WBC # BLD AUTO: 6.8 X10(3) UL (ref 4–11)

## 2024-09-28 PROCEDURE — 10907ZC DRAINAGE OF AMNIOTIC FLUID, THERAPEUTIC FROM PRODUCTS OF CONCEPTION, VIA NATURAL OR ARTIFICIAL OPENING: ICD-10-PCS | Performed by: ADVANCED PRACTICE MIDWIFE

## 2024-09-28 PROCEDURE — 59400 OBSTETRICAL CARE: CPT | Performed by: ADVANCED PRACTICE MIDWIFE

## 2024-09-28 RX ORDER — LIDOCAINE HYDROCHLORIDE 10 MG/ML
30 INJECTION, SOLUTION EPIDURAL; INFILTRATION; INTRACAUDAL; PERINEURAL ONCE
Status: DISCONTINUED | OUTPATIENT
Start: 2024-09-28 | End: 2024-09-28

## 2024-09-28 RX ORDER — ACETAMINOPHEN 500 MG
1000 TABLET ORAL EVERY 6 HOURS PRN
Status: DISCONTINUED | OUTPATIENT
Start: 2024-09-28 | End: 2024-09-30

## 2024-09-28 RX ORDER — ACETAMINOPHEN 500 MG
1000 TABLET ORAL EVERY 6 HOURS PRN
Status: DISCONTINUED | OUTPATIENT
Start: 2024-09-28 | End: 2024-09-28

## 2024-09-28 RX ORDER — DEXTROSE, SODIUM CHLORIDE, SODIUM LACTATE, POTASSIUM CHLORIDE, AND CALCIUM CHLORIDE 5; .6; .31; .03; .02 G/100ML; G/100ML; G/100ML; G/100ML; G/100ML
INJECTION, SOLUTION INTRAVENOUS CONTINUOUS
Status: DISCONTINUED | OUTPATIENT
Start: 2024-09-28 | End: 2024-09-28

## 2024-09-28 RX ORDER — BUPIVACAINE HYDROCHLORIDE 2.5 MG/ML
20 INJECTION, SOLUTION EPIDURAL; INFILTRATION; INTRACAUDAL ONCE
Status: DISCONTINUED | OUTPATIENT
Start: 2024-09-28 | End: 2024-09-28

## 2024-09-28 RX ORDER — LIDOCAINE HYDROCHLORIDE 10 MG/ML
INJECTION, SOLUTION INFILTRATION; PERINEURAL
Status: COMPLETED | OUTPATIENT
Start: 2024-09-28 | End: 2024-09-28

## 2024-09-28 RX ORDER — LEVOTHYROXINE SODIUM 25 UG/1
25 TABLET ORAL
Status: DISCONTINUED | OUTPATIENT
Start: 2024-09-28 | End: 2024-09-30

## 2024-09-28 RX ORDER — ONDANSETRON 2 MG/ML
4 INJECTION INTRAMUSCULAR; INTRAVENOUS EVERY 6 HOURS PRN
Status: DISCONTINUED | OUTPATIENT
Start: 2024-09-28 | End: 2024-09-28

## 2024-09-28 RX ORDER — AMMONIA INHALANTS 0.04 G/.3ML
0.3 INHALANT RESPIRATORY (INHALATION) AS NEEDED
Status: DISCONTINUED | OUTPATIENT
Start: 2024-09-28 | End: 2024-09-30

## 2024-09-28 RX ORDER — IBUPROFEN 600 MG/1
600 TABLET, FILM COATED ORAL ONCE AS NEEDED
Status: DISCONTINUED | OUTPATIENT
Start: 2024-09-28 | End: 2024-09-28

## 2024-09-28 RX ORDER — SODIUM CHLORIDE, SODIUM LACTATE, POTASSIUM CHLORIDE, CALCIUM CHLORIDE 600; 310; 30; 20 MG/100ML; MG/100ML; MG/100ML; MG/100ML
INJECTION, SOLUTION INTRAVENOUS AS NEEDED
Status: DISCONTINUED | OUTPATIENT
Start: 2024-09-28 | End: 2024-09-28

## 2024-09-28 RX ORDER — NALBUPHINE HYDROCHLORIDE 10 MG/ML
2.5 INJECTION, SOLUTION INTRAMUSCULAR; INTRAVENOUS; SUBCUTANEOUS
Status: COMPLETED | OUTPATIENT
Start: 2024-09-28 | End: 2024-09-28

## 2024-09-28 RX ORDER — CITRIC ACID/SODIUM CITRATE 334-500MG
30 SOLUTION, ORAL ORAL AS NEEDED
Status: DISCONTINUED | OUTPATIENT
Start: 2024-09-28 | End: 2024-09-28

## 2024-09-28 RX ORDER — ACETAMINOPHEN 500 MG
500 TABLET ORAL EVERY 6 HOURS PRN
Status: DISCONTINUED | OUTPATIENT
Start: 2024-09-28 | End: 2024-09-28

## 2024-09-28 RX ORDER — BISACODYL 10 MG
10 SUPPOSITORY, RECTAL RECTAL ONCE AS NEEDED
Status: DISCONTINUED | OUTPATIENT
Start: 2024-09-28 | End: 2024-09-30

## 2024-09-28 RX ORDER — LIDOCAINE HYDROCHLORIDE 10 MG/ML
INJECTION, SOLUTION EPIDURAL; INFILTRATION; INTRACAUDAL; PERINEURAL
Status: COMPLETED
Start: 2024-09-28 | End: 2024-09-28

## 2024-09-28 RX ORDER — LIDOCAINE HYDROCHLORIDE AND EPINEPHRINE 15; 5 MG/ML; UG/ML
INJECTION, SOLUTION EPIDURAL
Status: COMPLETED | OUTPATIENT
Start: 2024-09-28 | End: 2024-09-28

## 2024-09-28 RX ORDER — BUPIVACAINE HCL/0.9 % NACL/PF 0.25 %
5 PLASTIC BAG, INJECTION (ML) EPIDURAL AS NEEDED
Status: DISCONTINUED | OUTPATIENT
Start: 2024-09-28 | End: 2024-09-28

## 2024-09-28 RX ORDER — DOCUSATE SODIUM 100 MG/1
100 CAPSULE, LIQUID FILLED ORAL
Status: DISCONTINUED | OUTPATIENT
Start: 2024-09-28 | End: 2024-09-30

## 2024-09-28 RX ORDER — ACETAMINOPHEN 500 MG
500 TABLET ORAL EVERY 6 HOURS PRN
Status: DISCONTINUED | OUTPATIENT
Start: 2024-09-28 | End: 2024-09-30

## 2024-09-28 RX ORDER — TERBUTALINE SULFATE 1 MG/ML
0.25 INJECTION, SOLUTION SUBCUTANEOUS AS NEEDED
Status: DISCONTINUED | OUTPATIENT
Start: 2024-09-28 | End: 2024-09-28

## 2024-09-28 RX ORDER — SIMETHICONE 80 MG
80 TABLET,CHEWABLE ORAL 3 TIMES DAILY PRN
Status: DISCONTINUED | OUTPATIENT
Start: 2024-09-28 | End: 2024-09-30

## 2024-09-28 RX ORDER — IBUPROFEN 600 MG/1
600 TABLET, FILM COATED ORAL EVERY 6 HOURS
Status: DISCONTINUED | OUTPATIENT
Start: 2024-09-28 | End: 2024-09-30

## 2024-09-28 RX ORDER — BUPIVACAINE HYDROCHLORIDE 2.5 MG/ML
INJECTION, SOLUTION EPIDURAL; INFILTRATION; INTRACAUDAL
Status: COMPLETED | OUTPATIENT
Start: 2024-09-28 | End: 2024-09-28

## 2024-09-28 RX ORDER — CHOLECALCIFEROL (VITAMIN D3) 25 MCG
1 TABLET,CHEWABLE ORAL DAILY
Status: DISCONTINUED | OUTPATIENT
Start: 2024-09-28 | End: 2024-09-30

## 2024-09-28 RX ADMIN — BUPIVACAINE HYDROCHLORIDE 5 ML: 2.5 INJECTION, SOLUTION EPIDURAL; INFILTRATION; INTRACAUDAL at 17:10:00

## 2024-09-28 RX ADMIN — LIDOCAINE HYDROCHLORIDE 5 ML: 10 INJECTION, SOLUTION INFILTRATION; PERINEURAL at 17:10:00

## 2024-09-28 RX ADMIN — LIDOCAINE HYDROCHLORIDE AND EPINEPHRINE 5 ML: 15; 5 INJECTION, SOLUTION EPIDURAL at 17:10:00

## 2024-09-28 NOTE — ANESTHESIA PROCEDURE NOTES
Labor Analgesia    Date/Time: 9/28/2024 5:10 PM    Performed by: Enrique Guzman MD  Authorized by: Enrique Guzman MD      General Information and Staff    Start Time:  9/28/2024 5:10 PM  End Time:  9/28/2024 5:20 PM  Anesthesiologist:  Enrique Guzman MD  Performed by:  Anesthesiologist  Patient Location:  OB  Site Identification: surface landmarks    Reason for Block: labor epidural    Preanesthetic Checklist: patient identified, IV checked, risks and benefits discussed, monitors and equipment checked, pre-op evaluation, timeout performed, anesthesia consent and sterile technique used      Procedure Details    Patient Position:  Sitting  Prep: Betadine and patient draped    Monitoring:  Heart rate, cardiac monitor and continuous pulse ox  Approach:  Midline    Epidural Needle    Injection Technique:  SUBHASH air  Needle Type:  Tuohy  Needle Gauge:  18 G  Needle Length:  3.375 in  Needle Insertion Depth:  6  Location:  L3-4    Spinal Needle      Catheter    Catheter Type:  Side hole  Catheter Size:  20 G  Catheter at Skin Depth:  14    Assessment    Sensory Level:  T8    Additional Comments

## 2024-09-28 NOTE — PLAN OF CARE
Problem: BIRTH - VAGINAL/ SECTION  Goal: Fetal and maternal status remain reassuring during the birth process  Description: INTERVENTIONS:  - Monitor vital signs  - Monitor fetal heart rate  - Monitor uterine activity  - Monitor labor progression (vaginal delivery)  - DVT prophylaxis (C/S delivery)  - Surgical antibiotic prophylaxis (C/S delivery)  Outcome: Progressing     Problem: PAIN - ADULT  Goal: Verbalizes/displays adequate comfort level or patient's stated pain goal  Description: INTERVENTIONS:  - Encourage pt to monitor pain and request assistance  - Assess pain using appropriate pain scale  - Administer analgesics based on type and severity of pain and evaluate response  - Implement non-pharmacological measures as appropriate and evaluate response  - Consider cultural and social influences on pain and pain management  - Manage/alleviate anxiety  - Utilize distraction and/or relaxation techniques  - Monitor for opioid side effects  - Notify MD/LIP if interventions unsuccessful or patient reports new pain  - Anticipate increased pain with activity and pre-medicate as appropriate  Outcome: Progressing     Problem: ANXIETY  Goal: Will report anxiety at manageable levels  Description: INTERVENTIONS:  - Administer medication as ordered  - Teach and rehearse alternative coping skills  - Provide emotional support with 1:1 interaction with staff  Outcome: Progressing     Problem: Patient Centered Care  Goal: Patient preferences are identified and integrated in the patient's plan of care  Description: Interventions:  - What would you like us to know as we care for you? Baby #2  - Provide timely, complete, and accurate information to patient/family  - Incorporate patient and family knowledge, values, beliefs, and cultural backgrounds into the planning and delivery of care  - Encourage patient/family to participate in care and decision-making at the level they choose  - Honor patient and family perspectives  and choices  Outcome: Progressing     Problem: Patient/Family Goals  Goal: Patient/Family Long Term Goal  Description: Patient's Long Term Goal: uncomplicated vaginal delivery    Interventions:  - See additional Care Plan goals for specific interventions  Outcome: Progressing  Goal: Patient/Family Short Term Goal  Description: Patient's Short Term Goal: pain control    Interventions:   - epidural  - See additional Care Plan goals for specific interventions  Outcome: Progressing

## 2024-09-28 NOTE — PROGRESS NOTES
Pt is a 29 year old female admitted to LDR4/LDR4-A.        Pt is  40w0d intra-uterine pregnancy.  History obtained, consents signed. Oriented to room, staff, and plan of care.

## 2024-09-28 NOTE — ANESTHESIA PREPROCEDURE EVALUATION
Anesthesia PreOp Note    HPI:     Nanette Wayne is a 29 year old female who presents for preoperative consultation requested by: * No surgeons listed *    Date of Surgery: 9/28/2024    * No procedures listed *  Indication: * No pre-op diagnosis entered *    Relevant Problems   No relevant active problems       NPO:                         History Review:  Patient Active Problem List    Diagnosis Date Noted    Pregnancy ([facility]) 09/20/2024    Exposure to STD - partner HepB positive 07/10/2024    Susceptible to varicella (non-immune), currently pregnant ([facility]) 03/21/2024    Rubella non-immune status, antepartum ([facility]) 03/21/2024    Urinary frequency 05/10/2023    Gastroesophageal reflux disease 05/10/2023    Vitamin D deficiency 03/25/2021    Acquired hypothyroidism 12/30/2019    Scoliosis 09/19/2013       Past Medical History:    Exposure to STD - partner HepB positive    Thyroid disease    2 years ago on meds       Past Surgical History:   Procedure Laterality Date    [place] teeth removed  2021       Medications Prior to Admission   Medication Sig Dispense Refill Last Dose    prenatal vitamin with DHA 27-0.8-228 MG Oral Cap Take 1 capsule by mouth daily.   9/28/2024    levothyroxine 25 MCG Oral Tab Take 1 tablet (25 mcg total) by mouth before breakfast. 90 tablet 3 9/27/2024     Current Facility-Administered Medications Ordered in Epic   Medication Dose Route Frequency Provider Last Rate Last Admin    dextrose in lactated ringers 5% infusion   Intravenous Continuous Concepcion Carroll CNM        lactated ringers infusion   Intravenous PRN Concepcion Carroll  mL/hr at 09/28/24 1714 New Bag at 09/28/24 1714    lactated ringers IV bolus 500 mL  500 mL Intravenous PRN Concepcion Carroll CNM        acetaminophen (Tylenol Extra Strength) tab 500 mg  500 mg Oral Q6H PRN Concepcion Carroll CNM        acetaminophen (Tylenol Extra Strength) tab 1,000 mg  1,000 mg Oral Q6H PRN Concepcion Carroll CNM        ibuprofen (Motrin) tab 600 mg  600  mg Oral Once PRN Concepcion Carroll CNM        ondansetron (Zofran) 4 MG/2ML injection 4 mg  4 mg Intravenous Q6H PRN Concepcion Carroll CNM        oxyTOCIN in sodium chloride 0.9% (Pitocin) 30 Units/500mL infusion premix  62.5-900 sherly-units/min Intravenous Continuous Concepcion Carroll CNM        terbutaline (Brethine) 1 MG/ML injection 0.25 mg  0.25 mg Subcutaneous PRN Concepcion Carroll CNM        sodium citrate-citric acid (Bicitra) 500-334 MG/5ML oral solution 30 mL  30 mL Oral PRN Concepcion Carroll CNM        lidocaine PF (Xylocaine-MPF) 1% injection  30 mL Intradermal Once Concepcion Carroll CNM        fentaNYL (Sublimaze) 50 mcg/mL injection 100 mcg  100 mcg Intravenous Once Concepcion Carroll CNM        fentaNYL (Sublimaze) 50 mcg/mL injection 50 mcg  50 mcg Intravenous Q30 Min PRN Concepcion Carroll CNM        oxyTOCIN in sodium chloride 0.9% (Pitocin) 30 Units/500mL infusion premix  0.5-20 sherly-units/min Intravenous Continuous Concepcion Carroll CNM 10 mL/hr at 09/28/24 1700 10 sherly-units/min at 09/28/24 1700    lidocaine PF (Xylocaine-MPF) 1 % injection             fentaNYL-bupivacaine 2 mcg/mL-0.125% in sodium chloride 0.9% 100 mL EPIDURAL infusion premix   Epidural Continuous Enrique Guzman MD        fentaNYL (Sublimaze) 50 mcg/mL injection 100 mcg  100 mcg Epidural Once Enrique Guzman MD        bupivacaine PF (Marcaine) 0.25% injection  20 mL Epidural Once Enrique Guzman MD        EPHEDrine (PF) 25 MG/5 ML injection 5 mg  5 mg Intravenous PRN Enrique Guzman MD        nalbuphine (Nubain) 10 mg/mL injection 2.5 mg  2.5 mg Intravenous Q15 Min PRN Enrique Guzman MD         No current [facility]-ordered outpatient medications on file.       No Known Allergies    Family History   Problem Relation Age of Onset    Hypertension Father     Hypertension Paternal Grandmother     Other (Other) Paternal Grandmother         kidney stones    Hypertension Paternal Grandfather      Social History     Socioeconomic History     Marital status:      Spouse name: justin Leone    Number of children: 0    Highest education level: Bachelor's degree (e.g., BA, AB, BS)   Tobacco Use    Smoking status: Never    Smokeless tobacco: Never   Vaping Use    Vaping status: Never Used   Substance and Sexual Activity    Alcohol use: Not Currently    Drug use: Never   Other Topics Concern    Blood Transfusions No    Caffeine Concern No    Occupational Exposure No    Stress Concern No    Special Diet No    Exercise No    Bike Helmet No    Seat Belt Yes       Available pre-op labs reviewed.  Lab Results   Component Value Date    WBC 6.8 09/28/2024    RBC 4.00 09/28/2024    HGB 12.9 09/28/2024    HCT 36.7 09/28/2024    MCV 91.8 09/28/2024    MCH 32.3 09/28/2024    MCHC 35.1 09/28/2024    RDW 13.2 09/28/2024    .0 09/28/2024             Vital Signs:  Body mass index is 26.45 kg/m².   height is 1.549 m (5' 1\") and weight is 63.5 kg (140 lb). Her oral temperature is 97.9 °F (36.6 °C). Her blood pressure is 104/66 and her pulse is 74. Her respiration is 12 and oxygen saturation is 96%.   Vitals:    09/28/24 1723 09/28/24 1726 09/28/24 1728 09/28/24 1730   BP: 114/77 103/65 106/68 104/66   Pulse: 78 69 71 74   Resp:       Temp:       TempSrc:       SpO2:  97%  96%   Weight:       Height:            Anesthesia Evaluation     Patient summary reviewed and Nursing notes reviewed    Airway   Mallampati: II  TM distance: >3 FB  Neck ROM: full  Dental - Dentition appears grossly intact     Pulmonary - negative ROS and normal exam   Cardiovascular - negative ROS and normal exam    Neuro/Psych - negative ROS     GI/Hepatic/Renal    (+) GERD    Endo/Other    Abdominal                  Anesthesia Plan:   ASA:  2  Emergent    Plan:   Epidural  Post-op Pain Management: IV analgesics  Informed Consent Plan and Risks Discussed With:  Patient  Use of Blood Products Discussed With:  Patient  Blood Product Use Consented    Discussed plan with:  Surgeon      I have  informed Nanette Wayne and/or legal guardian or family member of the nature of the anesthetic plan, benefits, risks including possible dental damage if relevant, major complications, and any alternative forms of anesthetic management.   All of the patient's questions were answered to the best of my ability. The patient desires the anesthetic management as planned.  JC CAN MD  9/28/2024 5:36 PM  Present on Admission:  **None**

## 2024-09-29 LAB
BASOPHILS # BLD AUTO: 0.02 X10(3) UL (ref 0–0.2)
BASOPHILS # BLD AUTO: 0.03 X10(3) UL (ref 0–0.2)
BASOPHILS # BLD AUTO: 0.03 X10(3) UL (ref 0–0.2)
BASOPHILS NFR BLD AUTO: 0.2 %
BASOPHILS NFR BLD AUTO: 0.3 %
BASOPHILS NFR BLD AUTO: 0.3 %
DEPRECATED RDW RBC AUTO: 44.1 FL (ref 35.1–46.3)
DEPRECATED RDW RBC AUTO: 45.2 FL (ref 35.1–46.3)
DEPRECATED RDW RBC AUTO: 45.4 FL (ref 35.1–46.3)
EOSINOPHIL # BLD AUTO: 0.02 X10(3) UL (ref 0–0.7)
EOSINOPHIL # BLD AUTO: 0.04 X10(3) UL (ref 0–0.7)
EOSINOPHIL # BLD AUTO: 0.04 X10(3) UL (ref 0–0.7)
EOSINOPHIL NFR BLD AUTO: 0.2 %
EOSINOPHIL NFR BLD AUTO: 0.3 %
EOSINOPHIL NFR BLD AUTO: 0.4 %
ERYTHROCYTE [DISTWIDTH] IN BLOOD BY AUTOMATED COUNT: 13.2 % (ref 11–15)
ERYTHROCYTE [DISTWIDTH] IN BLOOD BY AUTOMATED COUNT: 13.3 % (ref 11–15)
ERYTHROCYTE [DISTWIDTH] IN BLOOD BY AUTOMATED COUNT: 13.3 % (ref 11–15)
HCT VFR BLD AUTO: 31.8 %
HCT VFR BLD AUTO: 33.8 %
HCT VFR BLD AUTO: 34.2 %
HGB BLD-MCNC: 11.2 G/DL
HGB BLD-MCNC: 11.5 G/DL
HGB BLD-MCNC: 11.6 G/DL
IMM GRANULOCYTES # BLD AUTO: 0.05 X10(3) UL (ref 0–1)
IMM GRANULOCYTES # BLD AUTO: 0.06 X10(3) UL (ref 0–1)
IMM GRANULOCYTES # BLD AUTO: 0.07 X10(3) UL (ref 0–1)
IMM GRANULOCYTES NFR BLD: 0.4 %
IMM GRANULOCYTES NFR BLD: 0.6 %
IMM GRANULOCYTES NFR BLD: 0.6 %
LYMPHOCYTES # BLD AUTO: 1.47 X10(3) UL (ref 1–4)
LYMPHOCYTES # BLD AUTO: 1.47 X10(3) UL (ref 1–4)
LYMPHOCYTES # BLD AUTO: 1.48 X10(3) UL (ref 1–4)
LYMPHOCYTES NFR BLD AUTO: 12.5 %
LYMPHOCYTES NFR BLD AUTO: 12.6 %
LYMPHOCYTES NFR BLD AUTO: 13.5 %
MCH RBC QN AUTO: 31.9 PG (ref 26–34)
MCH RBC QN AUTO: 31.9 PG (ref 26–34)
MCH RBC QN AUTO: 32.2 PG (ref 26–34)
MCHC RBC AUTO-ENTMCNC: 33.9 G/DL (ref 31–37)
MCHC RBC AUTO-ENTMCNC: 34 G/DL (ref 31–37)
MCHC RBC AUTO-ENTMCNC: 35.2 G/DL (ref 31–37)
MCV RBC AUTO: 91.4 FL
MCV RBC AUTO: 93.6 FL
MCV RBC AUTO: 94 FL
MONOCYTES # BLD AUTO: 0.8 X10(3) UL (ref 0.1–1)
MONOCYTES # BLD AUTO: 0.82 X10(3) UL (ref 0.1–1)
MONOCYTES # BLD AUTO: 0.89 X10(3) UL (ref 0.1–1)
MONOCYTES NFR BLD AUTO: 6.8 %
MONOCYTES NFR BLD AUTO: 6.9 %
MONOCYTES NFR BLD AUTO: 8.2 %
NEUTROPHILS # BLD AUTO: 8.41 X10 (3) UL (ref 1.5–7.7)
NEUTROPHILS # BLD AUTO: 8.41 X10(3) UL (ref 1.5–7.7)
NEUTROPHILS # BLD AUTO: 9.29 X10 (3) UL (ref 1.5–7.7)
NEUTROPHILS # BLD AUTO: 9.29 X10(3) UL (ref 1.5–7.7)
NEUTROPHILS # BLD AUTO: 9.46 X10 (3) UL (ref 1.5–7.7)
NEUTROPHILS # BLD AUTO: 9.46 X10(3) UL (ref 1.5–7.7)
NEUTROPHILS NFR BLD AUTO: 77 %
NEUTROPHILS NFR BLD AUTO: 79.6 %
NEUTROPHILS NFR BLD AUTO: 79.6 %
PLATELET # BLD AUTO: 144 10(3)UL (ref 150–450)
PLATELET # BLD AUTO: 152 10(3)UL (ref 150–450)
PLATELET # BLD AUTO: 159 10(3)UL (ref 150–450)
RBC # BLD AUTO: 3.48 X10(6)UL
RBC # BLD AUTO: 3.61 X10(6)UL
RBC # BLD AUTO: 3.64 X10(6)UL
WBC # BLD AUTO: 10.9 X10(3) UL (ref 4–11)
WBC # BLD AUTO: 11.7 X10(3) UL (ref 4–11)
WBC # BLD AUTO: 11.9 X10(3) UL (ref 4–11)

## 2024-09-29 PROCEDURE — 99232 SBSQ HOSP IP/OBS MODERATE 35: CPT | Performed by: OBSTETRICS & GYNECOLOGY

## 2024-09-29 RX ORDER — METHYLERGONOVINE MALEATE 0.2 MG/ML
INJECTION INTRAVENOUS
Status: DISCONTINUED
Start: 2024-09-29 | End: 2024-09-29

## 2024-09-29 RX ORDER — MISOPROSTOL 200 UG/1
1000 TABLET ORAL ONCE
Status: DISCONTINUED | OUTPATIENT
Start: 2024-09-29 | End: 2024-09-30

## 2024-09-29 RX ORDER — CARBOPROST TROMETHAMINE 250 UG/ML
INJECTION, SOLUTION INTRAMUSCULAR
Status: DISCONTINUED
Start: 2024-09-29 | End: 2024-09-29 | Stop reason: WASHOUT

## 2024-09-29 RX ORDER — MISOPROSTOL 200 UG/1
1000 TABLET ORAL EVERY 6 HOURS SCHEDULED
Status: DISCONTINUED | OUTPATIENT
Start: 2024-09-29 | End: 2024-09-29

## 2024-09-29 RX ORDER — MISOPROSTOL 200 UG/1
800 TABLET ORAL EVERY 6 HOURS SCHEDULED
Status: DISCONTINUED | OUTPATIENT
Start: 2024-09-29 | End: 2024-09-29

## 2024-09-29 RX ORDER — MISOPROSTOL 200 UG/1
TABLET ORAL
Status: DISCONTINUED
Start: 2024-09-29 | End: 2024-09-29

## 2024-09-29 RX ORDER — SODIUM CHLORIDE, SODIUM LACTATE, POTASSIUM CHLORIDE, CALCIUM CHLORIDE 600; 310; 30; 20 MG/100ML; MG/100ML; MG/100ML; MG/100ML
INJECTION, SOLUTION INTRAVENOUS CONTINUOUS
Status: DISCONTINUED | OUTPATIENT
Start: 2024-09-29 | End: 2024-09-30

## 2024-09-29 RX ORDER — MISOPROSTOL 200 UG/1
800 TABLET ORAL ONCE
Status: COMPLETED | OUTPATIENT
Start: 2024-09-29 | End: 2024-09-29

## 2024-09-29 RX ORDER — TRANEXAMIC ACID 10 MG/ML
INJECTION, SOLUTION INTRAVENOUS
Status: COMPLETED
Start: 2024-09-29 | End: 2024-09-29

## 2024-09-29 RX ORDER — METHYLERGONOVINE MALEATE 0.2 MG/ML
INJECTION INTRAVENOUS
Status: COMPLETED
Start: 2024-09-29 | End: 2024-09-29

## 2024-09-29 RX ORDER — METHYLERGONOVINE MALEATE 0.2 MG/ML
0.2 INJECTION INTRAVENOUS ONCE
Status: COMPLETED | OUTPATIENT
Start: 2024-09-29 | End: 2024-09-29

## 2024-09-29 NOTE — PROGRESS NOTES
This RN called to room by patient for concerns of bleeding. Patient stated she went to bathroom one hour prior and had since saturated a whole pad with blood. RN performed fundal check and large clot expressed.   Charge RN and Masha Ibrahim CNM called to come to room.  Additional clots expressed by CNM. Meds given. Fluids given.  Dr. Moss contacted directly per CNM request. In House OB Dr. Arreaga came to bedside for additional assessment.    Orders for further fundal assessments & CBC given by CNM.

## 2024-09-29 NOTE — L&D DELIVERY NOTE
[facility]  part of [facility]    Vaginal Delivery Note    Nanette Bailey Wayne Patient Status:  Inpatient    1995 MRN [id number]   Location [facility] Attending Concepcion Carroll CNM   Hosp Day # 0 GREGORY Champagne DO     Delivery     Infant  Date of Delivery: 2024    Time of Delivery: 9:50 PM   Delivery Type: Normal spontaneous vaginal delivery     Infant Sex/Birthweight: male 8 lb 8.5 oz (3.87 kg)     Presentation Vertex [1]   Position   Occiput [1]  Anterior [1]     Apgars:  1 minute: 9                5 minutes: 9                         10 minutes:      Placenta  Date/Time of Delivery: 2024  9:55 PM    Delivery: spontaneous  Placenta to Pathology: no  Cord Gases Submitted: no  Cord Blood Collection: yes  Cord Tissue Collection: no  Cord Complications: none  Sponge and Needle Counts:  Verified yes    Maternal Anesthesia: epidural   Episiotomy/Laceration Repair  1st degree, hemostatic and not in need of repair    Delivery Complications  none    Neonatologist Present: no  Delivery Comment: Nanette progressed to complete dilatation and +3 station prior to pushing successfully to viable baby boy. See Delivery Summary for time, APGARs, and weight. Fetal head presented in the OA position. Sweep for nuchal cord was performed and no nuchal cord identified. Anterior shoulder delivered spontaneously without traction. Baby vigorous at birth. To maternal abdomen for dry and stimulation then cord cut after pulsing ceased. Prophylactic IV pitocin initiated in 3rd stage. Spontaneous placenta by ng mechanism, complete with 3 vessel cord. Hemostasis achieved by fundal massage and prophylactic IV Pitocin. Vagina and perineum inspected and small 1st degree perineal laceration, hemostatic and not in need of repair. Mother and infant appeared in stable condition when midwife left the delivery room. Sharps and 4x4 counts were correct. Skin to skin initiated.    Quantitative  Blood Loss (mL) 270          Julia Bass CNM   9/28/2024  10:22 PM

## 2024-09-29 NOTE — H&P
[facility]  part of [facility]    History & Physical    Nanette Wayne Patient Status:  Inpatient    1995 MRN [id number]   Location [facility] Attending Concepcion Carroll CNM   Hosp Day # 0 Admitting Rosmery Moss DO     Date of Admission:  2024      HPI:   Nanette Wayne is 29 year old and  with current gestational age of 40w0d and estimated due date of 2024, by Ultrasound consistent with 6 w 4d ultrasound    Nanette is being admitted for labor management.    Her current obstetrical history is significant for Rubella non immune, varicella non-immune, hypothyroid..      Fetal Movement: normal.     History   Obstetric History:   OB History    Para Term  AB Living   2 1 1 0 0 1   SAB IAB Ectopic Multiple Live Births   0 0 0 0 1      # Outcome Date GA Lbr Rohith/2nd Weight Sex Type Anes PTL Lv   2 Current            1 Term 22 38w3d 06:09 / 00:40 7 lb 10.1 oz (3.46 kg) F NORMAL SPONT EPI N TONY     Past Medical History:   Past Medical History:    Exposure to STD - partner HepB positive    Thyroid disease    2 years ago on meds     Past Social History:   Past Surgical History:   Procedure Laterality Date    [place] teeth removed       Family History:   Family History   Problem Relation Age of Onset    Hypertension Father     Hypertension Paternal Grandmother     Other (Other) Paternal Grandmother         kidney stones    Hypertension Paternal Grandfather      Social History:   Social History     Tobacco Use    Smoking status: Never    Smokeless tobacco: Never   Substance Use Topics    Alcohol use: Not Currently        Allergies/Medications:   Allergies:   No Known Allergies  Medications:  Medications Prior to Admission   Medication Sig Dispense Refill Last Dose    prenatal vitamin with DHA 27-0.8-228 MG Oral Cap Take 1 capsule by mouth daily.   2024    levothyroxine 25 MCG Oral Tab Take 1 tablet (25 mcg total) by mouth before  breakfast. 90 tablet 3 9/27/2024       Review of Systems:   Constitutional: denies fever, aches, chills  HEENT: denies visual changes  Skin: denies any unusual skin lesions or itching  Lungs: denies shortness of breath  Cardiovascular: denies chest pain  GI: denies abdominal pain,denies heartburn, denies constipation or diarrhea  : denies dysuria, pelvic pain, vaginal discharge or bleeding  Musculoskeletal: denies back or joint pain  Neuro denies headaches or dizziness  Psych: denies depression or anxiety    Physical Exam:   Temp:  [97.9 °F (36.6 °C)] 97.9 °F (36.6 °C)  Pulse:  [68-85] 69  Resp:  [12] 12  BP: ()/(62-77) 111/71  SpO2:  [92 %-98 %] 96 %    Constitutional: alert, appears stated age, and cooperative  Skin: no lesions or erythema  Respiratory: clear, unlabored  Cardiac: regular rate and rhythm   Abdomen: soft, non-tender, EFW 6,  Fetal Surveillance:  130 BPM; Fetal heart variability: moderate  Fetal Heart Rate decelerations: early  Fetal Heart Rate accelerations: yes  Uterine contractions: regular, every 2-3 minutes  Pelvis: Gynecoid  External Genitalia:  No lesions  Sterile vaginal exam: Dilation: 6 cm dilation  Effacement: 90  Station: -2  Position: Cephalic  AROM of clear fluid  Pitocin infusing  Extremities: extremities normal, atraumatic, no cyanosis or edema  Musculoskeletal: steady gait  Psych:  Appropriate affect and speech    Results:     Prenatal Results       Initial       Test Value Reference Range Date Time    Blood Type (ABO Group)  O   09/28/24 1541    Rh Factor  Positive   09/28/24 1541    Antibody Screen (Required questions in OE to answer)  Negative   03/18/24 0853    HCT  35.2 % 35.0 - 48.0 03/18/24 0853       35.1 % 35.0 - 48.0 01/31/24 0913    HGB  11.6 g/dL 12.0 - 16.0 03/18/24 0853       12.6 g/dL 12.0 - 16.0 01/31/24 0913    MCV  92.4 fL 80.0 - 100.0 03/18/24 0853       89.5 fL 80.0 - 100.0 01/31/24 0913    Platelets  226.0 10(3)uL 150.0 - 450.0 03/18/24 0853       244.0  10(3)uL 150.0 - 450.0 01/31/24 0913    Rubella  Equivocal  Positive 03/18/24 0853    TREP  Nonreactive  Nonreactive  03/18/24 0853    RPR ([facility])        Urine Culture  No Growth at 18-24 hrs.   03/18/24 0853       No Growth 2 Days   02/02/24 1207    Hepatitis B  Nonreactive  Nonreactive  09/03/24 1110       Nonreactive  Nonreactive  07/02/24 1439       Nonreactive  Nonreactive  03/18/24 0853    HIV Combo  Non-Reactive  Non-Reactive 03/18/24 0853    HCV  Nonreactive  Nonreactive  03/18/24 0853              Optional Initial Labs       Test Value Reference Range Date Time    TSH  1.676 mIU/mL 0.550 - 4.780 05/23/24 1230       2.162 mIU/mL 0.550 - 4.780 03/18/24 0853       3.072 mIU/mL 0.550 - 4.780 01/26/24 1104    Pap Smear  Negative for intraepithelial lesion or malignancy  Negative for intraepithelial lesion or malignancy 02/02/24 1207    HPV        GC DNA  Negative  Negative 02/02/24 1207    Chlamydia DNA  Negative  Negative 02/02/24 1207    GTT 1 Hr        Glucose Fasting        Glucose 1 Hr        Glucose 2 Hr        Glucose 3 Hr        HgB A1c  4.9 % <5.7 03/18/24 0853    Vitamin D                  8-20 Weeks       Test Value Reference Range Date Time    1st Trimester Aneuploidy Risk Assessment        Quad - Down Screen Risk Estimate - prior to 02/20/18        Quad - Down Maternal Age Risk - prior to 02/20/18        Quad - Trisomy 18 screen Risk Estimate - prior to 02/20/18        AFP Spina Bifida (Required questions in OE to answer )        QUAD/AFP - Interpretation        Free Fetal DNA         Genetic testing        Genetic testing        Genetic testing        GC DNA        Chlamydia DNA                  24-28 Weeks       Test Value Reference Range Date Time    HCT  34.5 % 35.0 - 48.0 07/02/24 1439    HGB  11.8 g/dL 12.0 - 16.0 07/02/24 1439    Platelets  199.0 10(3)uL 150.0 - 450.0 07/02/24 1439    GTT 1 Hr  86 mg/dL See comment 07/02/24 1439    Glucose Fasting        Glucose 1 Hr        Glucose 2 Hr         Glucose 3 Hr        TSH         Profile        Urine Culture        GC DNA        Chlamydia DNA                  35-37 Weeks       Test Value Reference Range Date Time    HCT  36.7 % 35.0 - 48.0 24 1541    HGB  12.9 g/dL 12.0 - 16.0 24 1541    Platelets  194.0 10(3)uL 150.0 - 450.0 24 1541    TREP  Nonreactive  Nonreactive  24 1047    RPR ([facility])        Genital Group B Culture  Negative  Negative 24 1155    TSH  3.969 mIU/mL 0.550 - 4.780 24 1110    Urine Culture        HIV Combo  Non-Reactive  Non-Reactive 24 1047    GC DNA        Chlamydia DNA        Rapid HIV                  Optional Labs       Test Value Reference Range Date Time    HgB A1c  4.9 % <5.7 24 0853    HGB Electrophoresis  (See Report)    24 0853    Varicella Zoster  67.80  >165.00 24 0853    Cystic Fibrosis-Old         Cystic Fibrosis[32] (Required questions in OE to answer)        Cystic Fibrosis[165] (Required questions in OE to answer)        Cystic Fibrosis[165] (Required questions in OE to answer)        Cystic Fibrosis[165] (Required questions in OE to answer)        Sickle Cell        24Hr Urine Protein        24Hr Urine Creatinine        Parvo B19 IgM        Parvo B19 IgG                  Legend    ^: Historical                            Reviewed all prenatal ultrasounds    Admit labs pending    Assessment/Plan:   Nanette is 29 year old and  with current gestational age of 40w0d and estimated due date of 2024, by Ultrasound consistent with 6w4d ultrasound      Category 1 FHR tracing  Obstetrical history significant for Rubella non immune, varicella non-immune, hypothyroid.    Admission problem(s):    Normal labor (HCC)  Epidural  Pitocin augmentation        Acquired hypothyroidism        Susceptible to varicella (non-immune), currently pregnant ([facility])  Vaccinate PP      Rubella non-immune status, antepartum ([facility])  Vaccinate PP      Pregnancy ([facility])            Risks,  benefits, alternatives and possible complications have been discussed in detail with the patient.   Pre-admission, admission, and post admission procedures and expectations were discussed in detail.    All questions answered, all appropriate consents will be signed at the Hospital. Admission is planned for today.   Expectant management. and Anticipate vaginal delivery..    Concepcion Carroll CNM  9/28/2024  7:03 PM

## 2024-09-29 NOTE — PROGRESS NOTES
I received a call at 1100 requesting assistance for postpartum hemorrhage. Due to the emergent nature of the situation, I requested the in-house OB be called to the bedside immediately. Patient was noted to have soaked through a pad and had passed a large clot. Additional clot was expressed by the CNM and Dr. Arreaga, totaling about 1100mL. Methergine, cytotec and TXA were administered and pitocin infusion was re-started.    On my arrival, patient resting in bed, family at the bedside. She reports some low back pain but is otherwise comfortable. Has not felt any other gushes of blood. No chills or dizziness.     On exam, uterine fundus is firm 3cm below umbilicus, there is moderate bright red bleeding on the pad but no active bleeding. Vitals reviewed, normal. Repeat Hgb stable at 11.6.     Discussed with patient most likely etiology of bleeding is atony, low suspicion for infection or retained tissue at this time. Patient knows to notify staff if she has additional heavy bleeding. Recommend trending Hgb and transfusing as needed. Patient will likely need iron supplementation. If bleeding recurs, will perform bedside ultrasound to evaluate for retained tissue.     Rosmery Moss, DO

## 2024-09-29 NOTE — PROGRESS NOTES
[facility]  part of [facility]    OB POSTPARTUM HEMORRHAGE NOTE      Nanette Wayne Patient Status:  Inpatient    1995 MRN [id number]   Location [facility] 3SE Attending Concepcion Carroll CNM   Hosp Day # 1 PCP Tom Champagne DO     Mode of Delivery   Delivery Type: Normal spontaneous vaginal delivery     Delivery complications: None      Management of PP Hemorrhage   Medications: Pitocin, Cytotec (Misoprostol), Methergine, and Other TXA    Interventions: Fundal massage and manual removal of clots from cervix    Labs ordered: CBC    QBL: 1155    Called by postpartum nurse at 10:36am due to large clot and saturated pad with EBL at that time around 300. Arrived in room while RN was doing fundal massage.  Fundus boggy. Methergine and cytotec ordered. Manual sweep for clots performed and clots removed. Pitocin initiated. Patient then given fentanyl and additional manual sweep of clots done with additional clots extracted. TXA initiated and second IV line started. Fundus then firm with slow trickle with fundal massage. Dr. Moss called but not at hospital. Dr. Arreaga in room to assess. He removed small amt of clots. Fundus remains firm and bleeding minimal. CBC ordered. 1000mL IV fluid bolus initiated. Vitals stable throughout. Plan for another CBC in 4 hours.     Patient disposition:stable    Hgb 11.5 this morning. Hgb 11.6 at this time.  Dr. Moss assessed patient when she arrived and agrees that patient is stable. No further recommendations at this time.    Masha Ibrahim CNM  2024  11:40 AM

## 2024-09-29 NOTE — PROGRESS NOTES
[facility]  part of [facility]    OB/GYNE Progress Note      Nanette Wayne Patient Status:  Inpatient    1995 MRN [id number]   Location [facility] 3SE Attending Concepcion Carroll, RAYNA   Hosp Day # 1 PCP Tom Champagne DO        Assessment/Plan   Nanette Wayne is a 29 year old , day 1 after a vaginal delivery  Breastfeeding  without difficulty   Discharge home anticipated tomorrow  Postpartum teaching completed including danger signs and when to call the CNM      Normal labor ([facility])  Delivered      Acquired hypothyroidism  Stable on levothyroxine      Susceptible to varicella (non-immune), currently pregnant ([facility])  Offer vaccine prior to discharge      Rubella non-immune status, antepartum ([facility])  Offer vaccine prior to discharge       (normal spontaneous vaginal delivery) ([facility])  Normal PPD#1        Discussed with: nurse     patient and spouse     Plan discussed with patient who verbalizes understanding and agreement.        Subjective   Currently she denies excessive bleeding or pain. No clots. Denies SOB, chest pain, HA, dizziness. + void. No stool  Support at home: partner and mother in law  Has car seat   Desires pills for birth control    Review of Systems:  Constitutional: Denies   Genitourinary: Denies   Respiratory: Denies   Psychiatric: Denies         Objective   Vital signs in last 24 hours:  Temp:  [97.6 °F (36.4 °C)-98.7 °F (37.1 °C)] 98.7 °F (37.1 °C)  Pulse:  [68-91] 69  Resp:  [12-20] 18  BP: ()/(59-85) 109/68  SpO2:  [92 %-98 %] 96 %    Input/Output:    Intake/Output Summary (Last 24 hours) at 2024 0812  Last data filed at 2024 0400  Gross per 24 hour   Intake --   Output 1370 ml   Net -1370 ml       Weight (Last 6):  Wt Readings from Last 6 Encounters:   24 140 lb (63.5 kg)   24 140 lb (63.5 kg)   24 138 lb (62.6 kg)   24 134 lb 12.8 oz (61.1 kg)   24 133 lb (60.3 kg)   24 131 lb 6.4 oz (59.6 kg)     Body  mass index is 26.45 kg/m².     Exam:  Constitutional: Comfortable and bonding well with infant   Uterus: Fundus firm, below umbilicus   Wound/Incision: Well-approximated, no swelling or edema, small lochia rubra, no clots   Respiratory: Unlabored respirations   Breasts: Nipple/Areola normal bilaterally   Extremities: No edema. No evidence of DVT on exam   Psychiatric: Calm affect, appropriate     DVT Risk Score: Low risk        Results:     Lab Results   Component Value Date    TREPONEMALAB Nonreactive 09/28/2024    ABO O 09/28/2024    RH Positive 09/28/2024    WBC 11.9 (H) 09/29/2024    HGB 11.5 (L) 09/29/2024    HCT 33.8 (L) 09/29/2024    .0 09/29/2024    CREATSERUM 0.63 01/31/2024    BUN 12 01/31/2024     01/31/2024    K 3.3 (L) 01/31/2024     01/31/2024    CO2 26.0 01/31/2024    GLU 83 01/31/2024    CA 9.4 01/31/2024    ALB 3.9 05/16/2023    ALKPHO 57 05/16/2023    BILT 0.6 05/16/2023    TP 8.1 05/16/2023    AST 20 05/16/2023    ALT 19 05/16/2023    PTT 29.0 12/30/2021    INR 0.90 12/30/2021    T4F 1.0 05/23/2024    TSH 3.969 09/03/2024       Lab Results   Component Value Date    B12 835 05/16/2023    COLORUR Yellow 01/31/2024    CLARITY Turbid (A) 01/31/2024    SPECGRAVITY >=1.030 01/31/2024    PROUR 100 mg/dL (A) 01/31/2024    GLUUR Negative 01/31/2024    KETUR 15 (A) 01/31/2024    BILUR  01/31/2024      Comment:      Refer to ictotest confirmation for bilirubin result.      BLOODURINE Large (A) 01/31/2024    NITRITE Negative 01/31/2024    UROBILINOGEN 0.2 01/31/2024    LEUUR Negative 01/31/2024       No results found.          Masha Ibrahim CNM  9/29/2024  8:12 AM

## 2024-09-29 NOTE — PROGRESS NOTES
[facility]  part of [facility]    OB/GYNE Progress Note      Nanette Wayne Patient Status:  Inpatient    1995 MRN [id number]   Location [facility] 3SE Attending Concepcion Carroll CNM   Hosp Day # 1 PCP DO Krista Gould     I was called to the room for postpartum bleeding. S/p   with no sutures    Review of Systems:          Objective   Vital signs in last 24 hours:  Temp:  [97.6 °F (36.4 °C)-98.7 °F (37.1 °C)] 98 °F (36.7 °C)  Pulse:  [64-91] 74  Resp:  [12-20] 15  BP: ()/(59-85) 114/77  SpO2:  [92 %-98 %] 96 %    Input/Output:    Intake/Output Summary (Last 24 hours) at 2024 1112  Last data filed at 2024 0400  Gross per 24 hour   Intake --   Output 1370 ml   Net -1370 ml       Weight (Last 6):  Wt Readings from Last 6 Encounters:   24 140 lb (63.5 kg)   24 140 lb (63.5 kg)   24 138 lb (62.6 kg)   24 134 lb 12.8 oz (61.1 kg)   24 133 lb (60.3 kg)   24 131 lb 6.4 oz (59.6 kg)     Body mass index is 26.45 kg/m².     Exam:   On sve: I evacuated small blood clots x 3 from the cervix. Internal os is closed, fundus firm, and no acitve bleeding.   No bleeding from perineum      Results:     Lab Results   Component Value Date    TREPONEMALAB Nonreactive 2024    ABO O 2024    RH Positive 2024    WBC 11.9 (H) 2024    HGB 11.5 (L) 2024    HCT 33.8 (L) 2024    .0 2024    CREATSERUM 0.63 2024    BUN 12 2024     2024    K 3.3 (L) 2024     2024    CO2 26.0 2024    GLU 83 2024    CA 9.4 2024    ALB 3.9 2023    ALKPHO 57 2023    BILT 0.6 2023    TP 8.1 2023    AST 20 2023    ALT 19 2023    PTT 29.0 2021    INR 0.90 2021    T4F 1.0 2024    TSH 3.969 2024       Lab Results   Component Value Date    B12 835 2023    COLORUR Yellow 2024     CLARITY Turbid (A) 2024    SPECGRAVITY >=1.030 2024    PROUR 100 mg/dL (A) 2024    GLUUR Negative 2024    KETUR 15 (A) 2024    BILUR  2024      Comment:      Refer to ictotest confirmation for bilirubin result.      BLOODURINE Large (A) 2024    NITRITE Negative 2024    UROBILINOGEN 0.2 2024    LEUUR Negative 2024       No results found.      Assessment/Plan     Normal labor (HCC)        Acquired hypothyroidism        Susceptible to varicella (non-immune), currently pregnant (HCC)        Rubella non-immune status, antepartum (HCC)        Pregnancy (HCC)         (normal spontaneous vaginal delivery) (HCC)          Pp bleed- stable after evacuation. Fundus firm with no further bleeding after evacuation.       Discussed with: patient     Plan discussed with patient who verbalizes understanding and agreement.          Randall Arreaga MD  2024  11:12 AM

## 2024-09-29 NOTE — PROGRESS NOTES
Patient up to bathroom with assist x 2. Unable to void at this time. Patient transferred to mother/baby room 358 per wheelchair in stable condition with baby and personal belongings.  Accompanied by significant other and staff.  Report given to CARSON Mills.

## 2024-09-30 VITALS
OXYGEN SATURATION: 97 % | WEIGHT: 140 LBS | RESPIRATION RATE: 16 BRPM | TEMPERATURE: 98 F | DIASTOLIC BLOOD PRESSURE: 76 MMHG | SYSTOLIC BLOOD PRESSURE: 103 MMHG | HEIGHT: 61 IN | BODY MASS INDEX: 26.43 KG/M2 | HEART RATE: 57 BPM

## 2024-09-30 LAB
BASOPHILS # BLD AUTO: 0.02 X10(3) UL (ref 0–0.2)
BASOPHILS NFR BLD AUTO: 0.2 %
DEPRECATED RDW RBC AUTO: 46.2 FL (ref 35.1–46.3)
EOSINOPHIL # BLD AUTO: 0.1 X10(3) UL (ref 0–0.7)
EOSINOPHIL NFR BLD AUTO: 1.2 %
ERYTHROCYTE [DISTWIDTH] IN BLOOD BY AUTOMATED COUNT: 13.3 % (ref 11–15)
HCT VFR BLD AUTO: 29.8 %
HGB BLD-MCNC: 10 G/DL
IMM GRANULOCYTES # BLD AUTO: 0.07 X10(3) UL (ref 0–1)
IMM GRANULOCYTES NFR BLD: 0.9 %
LYMPHOCYTES # BLD AUTO: 1.88 X10(3) UL (ref 1–4)
LYMPHOCYTES NFR BLD AUTO: 23.1 %
MCH RBC QN AUTO: 31.8 PG (ref 26–34)
MCHC RBC AUTO-ENTMCNC: 33.6 G/DL (ref 31–37)
MCV RBC AUTO: 94.9 FL
MONOCYTES # BLD AUTO: 0.61 X10(3) UL (ref 0.1–1)
MONOCYTES NFR BLD AUTO: 7.5 %
NEUTROPHILS # BLD AUTO: 5.46 X10 (3) UL (ref 1.5–7.7)
NEUTROPHILS # BLD AUTO: 5.46 X10(3) UL (ref 1.5–7.7)
NEUTROPHILS NFR BLD AUTO: 67.1 %
PLATELET # BLD AUTO: 149 10(3)UL (ref 150–450)
RBC # BLD AUTO: 3.14 X10(6)UL
WBC # BLD AUTO: 8.1 X10(3) UL (ref 4–11)

## 2024-09-30 PROCEDURE — 3E0134Z INTRODUCTION OF SERUM, TOXOID AND VACCINE INTO SUBCUTANEOUS TISSUE, PERCUTANEOUS APPROACH: ICD-10-PCS | Performed by: ADVANCED PRACTICE MIDWIFE

## 2024-09-30 RX ORDER — FERROUS SULFATE 325(65) MG
325 TABLET, DELAYED RELEASE (ENTERIC COATED) ORAL EVERY OTHER DAY
Status: DISCONTINUED | OUTPATIENT
Start: 2024-09-30 | End: 2024-09-30

## 2024-09-30 RX ORDER — PSEUDOEPHEDRINE HCL 30 MG
100 TABLET ORAL 2 TIMES DAILY PRN
Qty: 30 CAPSULE | Refills: 0 | Status: SHIPPED | OUTPATIENT
Start: 2024-09-30

## 2024-09-30 RX ORDER — IBUPROFEN 600 MG/1
600 TABLET, FILM COATED ORAL EVERY 6 HOURS
Qty: 30 TABLET | Refills: 0 | Status: SHIPPED | OUTPATIENT
Start: 2024-09-30

## 2024-09-30 RX ORDER — FERROUS SULFATE 325(65) MG
325 TABLET, DELAYED RELEASE (ENTERIC COATED) ORAL EVERY OTHER DAY
Qty: 30 TABLET | Refills: 0 | Status: SHIPPED | OUTPATIENT
Start: 2024-09-30

## 2024-09-30 NOTE — DISCHARGE SUMMARY
[facility]  part of [facility]    Discharge Summary    Nanette Wayne Patient Status:  Inpatient    1995 MRN [id number]   Location [facility] 3SE Attending Concepcion Carroll CNM   Hosp Day # 2       Delivering OB Clinician: Julia Bass CNM    EDC: Estimated Date of Delivery: 24    Gestational Age: 40w0d    Antepartum complications:   Patient Active Problem List   Diagnosis    Acquired hypothyroidism    Vitamin D deficiency    Scoliosis    Urinary frequency    Gastroesophageal reflux disease    Susceptible to varicella (non-immune), currently pregnant (HCC)    Rubella non-immune status, antepartum (HCC)    Exposure to STD - partner HepB positive    Pregnancy (HCC)    Normal labor (HCC)     (normal spontaneous vaginal delivery) (HCC)    Postpartum hemorrhage (HCC)       Date of Delivery: 2024  Time of Delivery: 9:50 PM     Delivery Type: spontaneous vaginal delivery    Baby: Liveborn male   Information for the patient's :  Allan Wayne [[id number]]   8 lb 8.5 oz (3.87 kg)   Apgars:  1 minute: 9   5 minutes: 9 10 minutes:       Intrapartum Complications: None    Admit Date: 2024    Discharge Date: 2024    Hospital Course:  PPD#1 delayed postpartum hemorrhage  Routine delivery and postpartum care    Discharged Condition: stable    Disposition: home    Plan:     Follow-up appointment in 2 weeks with RAYNA Vieyra CNM  2024  12:06 PM

## 2024-09-30 NOTE — DISCHARGE INSTRUCTIONS
-Pelvic rest for 6 weeks; no sex, tampons, douching, baths, or pools until after 6 weeks check-up.  -No heavy lifting; increase activity gradually.  -No driving if taking narcotics.    CALL YOUR PROVIDER IF:  Increased/heavy bleeding (I.e. Changing a saturated pad every hour).  New onset chills/fever greater than 100.4.  Pain in your vagina or abdomen that gets worse and isn't relieved with medicine.  Swelling or discharge with a bad odor from vagina.  Burning, pain, red streaks, or lumpy areas in your breasts that may be accompanied by flu-like symptoms.  Painful urination, or inability to control urination.  Nausea, vomiting, dizziness, or fainting.  Feelings of extreme sadness or anxiety, or a feeling that you don’t want to be with your baby.  Redness, warmth, or pain in the lower leg.  Chest pain or shortness of breath.  If : Check incision site AT LEAST 2x daily for signs and symptoms of infection (increased redness, warmth, tenderness, or drainage)    Mom & Baby Hour: Meets in person every WEDNESDAY at 10am at the [facility] in [place] ([address]) in the community education room. The group is for new moms and their babies up to 6 months of age. It includes breastfeeding supports with a certified lactation educator to be available to answer your breastfeeding questions.    Discharge Instructions for Vaginal Delivery  Follow-up  Schedule a  follow-up exam with the midwife who delivered you in 2 weeks and 6 weeks. Please remember to call as soon as possible for this appointment. After having a baby, your body may be very tired. It can take time to recover from a vaginal delivery. Please remember this and call if you have any questions or concerns before your 6 week appointment.   Medications    Please take Motrin at home for pain control 600mg every 6 hours as needed  Continue taking your Prenatal Vitamin daily, as long as you are nursing  Ferrous Sulfate 325 mg once  every other day (may obtain in form of Gentle Iron, Slow FE, or liquid Floradix)  Vitamin D3 2000 IU daily  Colace (stool softener)  once or twice per day as needed  If you have stitches  You may have received stitches in the skin near your vagina. The stitches might have closed an episiotomy (an incision that enlarges the opening of the vagina). Or you may have needed stitches to repair torn skin. Either way, your stitches should dissolve within weeks. Until then, you can help reduce discomfort, aid healing, and reduce your risk of infection by keeping the stitches clean. These tips can help:  Gently wipe from front to back after you urinate or have a bowel movement.  After wiping, spray warm water on the area. Or you can have a sitz bath. This means sitting in a tub with a few inches of water in it. Then pat the area dry or use a hairdryer on a cool setting.  You can take a shower unless told not to.  Change sanitary pads at least every 2 to 4 hours.  Place cold packs as need, but remember to keep a thin towel between the pack and your skin.  Activity  Here are some suggestions:  Get lots of rest. Take naps as often as your can.   Increase your activities gradually.   Don’t have sexual intercourse until after you’ve had a follow-up appointment and you have decided on a birth control method.  Remember your are on pelvic rest, so nothing in your vagina (tampons etc...), no tub baths, and no swimming pools.       When to call your healthcare provider  Call your health care provider right away if you have:  A fever of 100.4°F (38.0°C) or higher  Bleeding that requires a new sanitary pad after an hour, or large blood clots. Remember your bleeding will wax and wane. Please call if you are consistently saturating a pad and hour.   Pain in your vagina that gets worse and isn't relieved with medicine.  Burning, pain, red streaks, or lumpy areas in your breasts that may be accompanied by flu-like symptoms  Nausea or  vomiting  Dizziness or fainting  Feelings of extreme sadness or anxiety, or a feeling that you don’t want to be with your baby  Abdominal pain that isn’t relieved with medicine  No bowel movement for 5 days  Redness, warmth, or pain in the lower leg  Chest pain

## 2024-09-30 NOTE — PROGRESS NOTES
[facility]  part of [facility]    OB/GYNE Progress Note      Nanette Wayne Patient Status:  Inpatient    1995 MRN [id number]   Location [facility] 3SE Attending Concepcion Carroll, RAYNA   Hosp Day # 2 PCP Tom Champagne DO     Assessment & Plan:  Nanette Wayne is a 29 year old  PPD#2 from a . PP course complicated by postpartum hemorrhage on , CNM called to room after patient concerns of bleeding after saturating one pad in one hour. Fundus boggy. Methergine and cytotec ordered. Manual sweep for clots performed and clots removed. Pitocin initiated. Patient then given fentanyl and additional manual sweep of clots done with additional clots extracted. TXA initiated and second IV line started. Fundus then firm with slow trickle with fundal massage. Doing well today, no concerns for bleeding or pain; hemoglobin this morning 10.0.    Normal labor ([facility])        Acquired hypothyroidism        Susceptible to varicella (non-immune), currently pregnant ([facility])  -vaccinated pp      Rubella non-immune status, antepartum ([facility])  -vaccinated pp      Pregnancy ([facility])         (normal spontaneous vaginal delivery) ([facility])  - Delivered   - Ready for discharge      Postpartum hemorrhage ([facility])  -: hemoglobin 10.0 hematocrit 29.8             Discussed with:      nurse     patient and spouse     Plan discussed with patient who verbalizes understanding and agreement.    Subjective: Feeling well, would like to go home today. Reports ambulating well and decreased lochia.     Review of Systems:      Gynecological: lochia  Gastrointestinal: denies and constipation  Genitourinary: denies  Constitutional: denies  Musculoskeletal: denies  Psychiatric: denies    Objective:   Vital signs in last 24 hours:  Temp:  [97.5 °F (36.4 °C)-98.3 °F (36.8 °C)] 97.5 °F (36.4 °C)  Pulse:  [57-71] 57  Resp:  [16] 16  BP: (103-114)/(66-86) 103/76  SpO2:  [94 %-97 %] 97 %    Input/Output:    Intake/Output  Summary (Last 24 hours) at 9/30/2024 1149  Last data filed at 9/29/2024 1325  Gross per 24 hour   Intake --   Output 1 ml   Net -1 ml       Weight (Last 6):  Wt Readings from Last 6 Encounters:   09/28/24 140 lb (63.5 kg)   09/27/24 140 lb (63.5 kg)   09/20/24 138 lb (62.6 kg)   09/09/24 134 lb 12.8 oz (61.1 kg)   09/03/24 133 lb (60.3 kg)   08/22/24 131 lb 6.4 oz (59.6 kg)     Body mass index is 26.45 kg/m².       Exam:      Constitutional: comfortable  Genitourinary: Labia: normal  Uterus: normal size, fundus firm, and fundus below umbilicus  Wound/Incision: clean, dry and intact  Breast Exam: General appearance: normal  Nipple/Areola: normal: bilateral  Psychiatric: calm  Lochia: small rubra no clots or odor  Perineum: without swelling       Results:   Lab Results   Component Value Date    TREPONEMALAB Nonreactive 09/28/2024    ABO O 09/28/2024    RH Positive 09/28/2024    WBC 8.1 09/30/2024    HGB 10.0 (L) 09/30/2024    HCT 29.8 (L) 09/30/2024    .0 (L) 09/30/2024    CREATSERUM 0.63 01/31/2024    BUN 12 01/31/2024     01/31/2024    K 3.3 (L) 01/31/2024     01/31/2024    CO2 26.0 01/31/2024    GLU 83 01/31/2024    CA 9.4 01/31/2024    ALB 3.9 05/16/2023    ALKPHO 57 05/16/2023    BILT 0.6 05/16/2023    TP 8.1 05/16/2023    AST 20 05/16/2023    ALT 19 05/16/2023    PTT 29.0 12/30/2021    INR 0.90 12/30/2021    T4F 1.0 05/23/2024    TSH 3.969 09/03/2024       Lab Results   Component Value Date    B12 835 05/16/2023    COLORUR Yellow 01/31/2024    CLARITY Turbid (A) 01/31/2024    SPECGRAVITY >=1.030 01/31/2024    PROUR 100 mg/dL (A) 01/31/2024    GLUUR Negative 01/31/2024    KETUR 15 (A) 01/31/2024    BILUR  01/31/2024      Comment:      Refer to ictotest confirmation for bilirubin result.      BLOODURINE Large (A) 01/31/2024    NITRITE Negative 01/31/2024    UROBILINOGEN 0.2 01/31/2024    LEUUR Negative 01/31/2024       No results found.         Concepcion Carroll CNM  9/30/2024  11:49 AM

## 2024-09-30 NOTE — ANESTHESIA POSTPROCEDURE EVALUATION
Patient: Nanette Wayne    Procedure Summary       Date: 09/28/24 Room / Location:     Anesthesia Start: 1710 Anesthesia Stop: 2155    Procedure: LABOR ANALGESIA Diagnosis:     Scheduled Providers:  Anesthesiologist: Enrique Can MD    Anesthesia Type: epidural ASA Status: 2 - Emergent            Anesthesia Type: epidural    Vitals Value Taken Time   /74 09/28/24 2245   Temp 97.6 09/30/24 1438   Pulse 78 09/28/24 2245   Resp 18 09/28/24 2245   SpO2 96 % 09/28/24 2200       [facility] AN Post Evaluation:   Patient Evaluated in PACU  Patient Participation: complete - patient participated  Level of Consciousness: awake  Pain Management: adequate  Airway Patency:patent  Dental exam unchanged from preop  Yes    Cardiovascular Status: acceptable  Respiratory Status: acceptable  Postoperative Hydration acceptable      ENRIQUE CAN MD  9/30/2024 2:38 PM

## 2024-10-03 ENCOUNTER — TELEPHONE (OUTPATIENT)
Dept: OBGYN CLINIC | Facility: CLINIC | Age: 29
End: 2024-10-03

## 2024-10-03 NOTE — TELEPHONE ENCOUNTER
Patient called to check status of Americans with Disabilities Act, processing time given.  Informed patient we are beginning to process her form.  Patient acknowledged.

## 2024-10-09 NOTE — PROGRESS NOTES
Subjective: Nanette is 2 weeks postpartum after a vaginal delivery of a baby boy with myself.  See L&D delivery summary for birth statistics.  Had PPH morning after delivery of 1155 ml.. hgB 10.0 on discharge    She is without concerns today. Bleeding is decreasing and not experiencing any excessive pain.    Breastfeeding and supplementing with formula and reports infant is experiencing adequate weight gain.    She denies any signs/symptoms of postpartum depression and has adequate family support.  Denies any abuse.    Contraceptive plan: POP's    Review of Systems   Constitutional: Negative.    Respiratory: Negative.     Genitourinary: Negative.    Neurological: Negative.    Psychiatric/Behavioral: Negative.         Objective:   There were no vitals filed for this visit.  Wt Readings from Last 6 Encounters:   09/28/24 140 lb (63.5 kg)   09/27/24 140 lb (63.5 kg)   09/20/24 138 lb (62.6 kg)   09/09/24 134 lb 12.8 oz (61.1 kg)   09/03/24 133 lb (60.3 kg)   08/22/24 131 lb 6.4 oz (59.6 kg)       Physical Exam  Constitutional:       General: She is not in acute distress.     Appearance: Normal appearance. She is not ill-appearing.   Pulmonary:      Effort: Pulmonary effort is normal.   Neurological:      Mental Status: She is alert and oriented to person, place, and time.   Psychiatric:         Mood and Affect: Mood normal.         Behavior: Behavior normal.         Thought Content: Thought content normal.          Assessment/Plan:   2 weeks postpartum, stable.   Breast / Formula feeding without difficulty  Contraception: POP's    Return to clinic: 4 weeks    Counseling included:   Signs/symptoms of postpartum depression  Postpartum danger signs  Lactation support and troubleshooting  Maternal and family adaption  Sleep/rest strategies  Sexuality  Contraception    Nanette verbalized understanding of plan of care. All questions answered. No barriers to learning identified.    This visit is conducted using Telemedicine with  live, interactive video and audio.    Patient has been referred to the [facility] website at [email or web address] to review the yearly Consent to Treat document.    Patient understands and accepts financial responsibility for any deductible, co-insurance and/or co-pays associated with this service.

## 2024-10-11 ENCOUNTER — TELEMEDICINE (OUTPATIENT)
Dept: OBGYN CLINIC | Facility: CLINIC | Age: 29
End: 2024-10-11

## 2024-10-23 ENCOUNTER — OFFICE VISIT (OUTPATIENT)
Dept: FAMILY MEDICINE CLINIC | Facility: CLINIC | Age: 29
End: 2024-10-23

## 2024-10-23 VITALS
DIASTOLIC BLOOD PRESSURE: 62 MMHG | HEIGHT: 61 IN | WEIGHT: 115.06 LBS | SYSTOLIC BLOOD PRESSURE: 99 MMHG | HEART RATE: 65 BPM | BODY MASS INDEX: 21.72 KG/M2

## 2024-10-23 DIAGNOSIS — H92.01 RIGHT EAR PAIN: Primary | ICD-10-CM

## 2024-10-23 PROCEDURE — 99213 OFFICE O/P EST LOW 20 MIN: CPT | Performed by: PHYSICIAN ASSISTANT

## 2024-10-23 NOTE — PROGRESS NOTES
HPI:     Ear Pain   There is pain in the right ear. This is a new problem. The current episode started yesterday. The problem has been waxing and waning. There has been no fever. Pertinent negatives include no coughing, diarrhea, ear discharge, headaches, neck pain, rash, rhinorrhea, sore throat or vomiting. She has tried NSAIDs for the symptoms. The treatment provided no relief.       Medications:     Current Outpatient Medications   Medication Sig Dispense Refill    ferrous sulfate 325 (65 FE) MG Oral Tab EC Take 1 tablet (325 mg total) by mouth every other day. 30 tablet 0    docusate sodium 100 MG Oral Cap Take 100 mg by mouth 2 (two) times daily as needed for constipation. 30 capsule 0    ibuprofen 600 MG Oral Tab Take 1 tablet (600 mg total) by mouth every 6 (six) hours. 30 tablet 0    prenatal vitamin with DHA 27-0.8-228 MG Oral Cap Take 1 capsule by mouth daily.      levothyroxine 25 MCG Oral Tab Take 1 tablet (25 mcg total) by mouth before breakfast. 90 tablet 3       Allergies:   Allergies[1]    History:     Health Maintenance   Topic Date Due    Annual Physical  05/09/2024    COVID-19 Vaccine (4 - 2023-24 season) 09/01/2024    Influenza Vaccine (1) 10/01/2024    Pap Smear  02/02/2027    DTaP,Tdap,and Td Vaccines (4 - Td or Tdap) 07/10/2034    Annual Depression Screening  Completed    Pneumococcal Vaccine: Birth to 64yrs  Aged Out       Patient's last menstrual period was 12/12/2023 (within days).   Past Medical History:     Past Medical History:    Exposure to STD - partner HepB positive    Thyroid disease    2 years ago on meds       Past Surgical History:     Past Surgical History:   Procedure Laterality Date    Adrian teeth removed  2021       Family History:     Family History   Problem Relation Age of Onset    Hypertension Father     Hypertension Paternal Grandmother     Other (Other) Paternal Grandmother         kidney stones    Hypertension Paternal Grandfather        Social History:     Social  History     Socioeconomic History    Marital status:      Spouse name: justin Leone    Number of children: 0    Years of education: Not on file    Highest education level: Bachelor's degree (e.g., BA, AB, BS)   Occupational History    Not on file   Tobacco Use    Smoking status: Never    Smokeless tobacco: Never   Vaping Use    Vaping status: Never Used   Substance and Sexual Activity    Alcohol use: Not Currently    Drug use: Never    Sexual activity: Not on file   Other Topics Concern     Service Not Asked    Blood Transfusions No    Caffeine Concern No    Occupational Exposure No    Hobby Hazards Not Asked    Sleep Concern Not Asked    Stress Concern No    Weight Concern Not Asked    Special Diet No    Back Care Not Asked    Exercise No    Bike Helmet No    Seat Belt Yes    Self-Exams Not Asked   Social History Narrative    Not on file     Social Drivers of Health     Financial Resource Strain: Low Risk  (9/28/2024)    Financial Resource Strain     Difficulty of Paying Living Expenses: Not hard at all     Med Affordability: No   Food Insecurity: No Food Insecurity (9/28/2024)    Food Insecurity     Food Insecurity: Never true   Transportation Needs: No Transportation Needs (9/28/2024)    Transportation Needs     Lack of Transportation: No     Car Seat: Yes   Stress: No Stress Concern Present (9/28/2024)    Stress     Feeling of Stress : No   Housing Stability: Low Risk  (9/28/2024)    Housing Stability     Housing Instability: No     Housing Instability Emergency: Not on file     Crib or Bassinette: Yes       Review of Systems:   Review of Systems   HENT:  Positive for ear pain. Negative for ear discharge, rhinorrhea and sore throat.    Respiratory:  Negative for cough.    Gastrointestinal:  Negative for diarrhea and vomiting.   Musculoskeletal:  Negative for neck pain.   Skin:  Negative for rash.   Neurological:  Negative for headaches.        Vitals:    10/23/24 1400   BP: 99/62   Pulse: 65    Weight: 115 lb 0.9 oz (52.2 kg)   Height: 5' 1\" (1.549 m)     Body mass index is 21.74 kg/m².    Physical Exam:   Physical Exam  Vitals reviewed.   Constitutional:       General: She is not in acute distress.     Appearance: She is well-developed.   HENT:      Head: Normocephalic and atraumatic.      Right Ear: Tympanic membrane, ear canal and external ear normal. There is no impacted cerumen.      Left Ear: Tympanic membrane, ear canal and external ear normal. There is no impacted cerumen.      Nose: Nose normal.      Mouth/Throat:      Mouth: Mucous membranes are moist.      Pharynx: Oropharynx is clear. No oropharyngeal exudate or posterior oropharyngeal erythema.   Eyes:      General:         Right eye: No discharge.         Left eye: No discharge.      Conjunctiva/sclera: Conjunctivae normal.   Cardiovascular:      Rate and Rhythm: Normal rate and regular rhythm.      Heart sounds: Normal heart sounds, S1 normal and S2 normal. No murmur heard.  Pulmonary:      Effort: Pulmonary effort is normal.      Breath sounds: Normal breath sounds. No wheezing or rales.   Chest:      Chest wall: No tenderness.   Lymphadenopathy:      Cervical: No cervical adenopathy.   Skin:     Findings: No rash.   Neurological:      Mental Status: She is alert and oriented to person, place, and time.   Psychiatric:         Behavior: Behavior is cooperative.          Assessment and Plan::     Problem List Items Addressed This Visit    None  Visit Diagnoses       Right ear pain    -  Primary        Reassured the patient. Advise patient to take Tylenol or Ibuprofen as needed for pain.    Discussed plan of care with pt and pt is in agreement.All questions answered. Pt to call with questions or concerns.         [1] No Known Allergies

## 2024-10-29 ENCOUNTER — TELEPHONE (OUTPATIENT)
Dept: OBGYN UNIT | Facility: HOSPITAL | Age: 29
End: 2024-10-29

## 2024-11-10 NOTE — PROGRESS NOTES
Patient here for postpartum check-up.spontaneous vaginal delivery 2024  of a viable baby boy with myself.     OB History    Para Term  AB Living   2 2 2 0 0 2   SAB IAB Ectopic Multiple Live Births   0 0 0 0 2      # Outcome Date GA Lbr Rohith/2nd Weight Sex Type Anes PTL Lv   2 Term 24 40w0d 02:51 / 00:21 8 lb 8.5 oz (3.87 kg) M NORMAL SPONT EPI N TONY      Complications: Late decelerations   1 Term 22 38w3d 06:09 / 00:40 7 lb 10.1 oz (3.46 kg) F NORMAL SPONT EPI N TONY        Lab Results   Component Value Date    WBC 8.1 2024    HGB 10.0 (L) 2024    HCT 29.8 (L) 2024    .0 (L) 2024    NEPERCENT 67.1 2024    LYPERCENT 23.1 2024    MOPERCENT 7.5 2024    EOPERCENT 1.2 2024    BAPERCENT 0.2 2024    NE 5.46 2024    LYMABS 1.88 2024    MOABSO 0.61 2024    EOABSO 0.10 2024    BAABSO 0.02 2024         Breastfeeding exclusively, on demand. Baby with adequate weight gain.   Denies fevers, chills, body aches and flu-like symptoms.  Denies abdominal pain, no pelvic pain, reports no vaginal bleeding. Bleeding stopped 1 week ago     Denies dysuria, urinary frequency and urinary incontinence.  Denies constipation, painful bowel movements and fecal incontinence.  Denies symptoms of postpartum depression including mood, affect, appetite / activity level changes. Has adequate support at home.     Perineum concerns: No pain, no hemorrhoids, No laceration repair  EPDS Score: Total: 1  ABDON screen: 0  Desires TAYLOR's- plans to stop breastfeeding by beginning of December as going back to work.       Physical Exam  Vitals reviewed.   Constitutional:       General: She is not in acute distress.     Appearance: Normal appearance. She is well-developed. She is not ill-appearing.   Neck:      Thyroid: No thyroid mass or thyromegaly.   Pulmonary:      Effort: Pulmonary effort is normal.   Chest:   Breasts:     Right: No mass,  skin change or tenderness. Nipple discharge: lactating.     Left: No mass, skin change or tenderness. Nipple discharge: lactating.  Genitourinary:     General: Normal vulva.      Pubic Area: No rash.       Labia:         Right: No rash, tenderness, lesion or injury.         Left: No rash, tenderness, lesion or injury.       Urethra: No prolapse, urethral pain, urethral swelling or urethral lesion.      Vagina: Normal. No signs of injury and foreign body. No vaginal discharge, erythema, tenderness, bleeding, lesions or prolapsed vaginal walls.      Cervix: No cervical motion tenderness, discharge, friability, lesion, erythema, cervical bleeding or eversion.      Uterus: Normal. Not deviated, not enlarged, not fixed, not tender and no uterine prolapse.       Comments: Perineum intact  Neurological:      Mental Status: She is alert and oriented to person, place, and time.   Psychiatric:         Speech: Speech normal.         Behavior: Behavior normal.         Thought Content: Thought content normal.         Judgment: Judgment normal.        1FB Diastasis Recti    Nanette was seen today for postpartum care.    Diagnoses and all orders for this visit:    Postpartum exam (HCC)    Postpartum anemia (HCC)  -     CBC, Platelet; No Differential; Future    Thrombocytopenia (HCC)    Birth control counseling  -     Discontinue: Norethindrone 0.35 MG Oral Tab; Take 1 tablet (0.35 mg total) by mouth daily.    Other orders  -     Norethindrone Acet-Ethinyl Est 1-20 MG-MCG Oral Tab; Take 1 tablet by mouth daily.         A: Normal PP involution      Breastfeeding       P: Continue to breastfeed exclusively, continue PNV while breastfeeding and for preconception.      TAYLOR's for BC method. Rx to pharmacy. B  Risks of OCP's especially DVT, elevated blood pressure, and failure resulting in pregnancy were reviewed.  Increased risk for heart attack and stroke especially with tobacco use was also discussed.  Potential side effects and  non-contraceptive benefits were reviewed.  ACHES reviewed. Start today. Back up method x 1 wk until effective.     Last pap 02/2024- NIL

## 2024-11-12 ENCOUNTER — POSTPARTUM (OUTPATIENT)
Dept: OBGYN CLINIC | Facility: CLINIC | Age: 29
End: 2024-11-12

## 2024-11-12 ENCOUNTER — LAB ENCOUNTER (OUTPATIENT)
Dept: LAB | Facility: HOSPITAL | Age: 29
End: 2024-11-12
Attending: ADVANCED PRACTICE MIDWIFE
Payer: COMMERCIAL

## 2024-11-12 VITALS
SYSTOLIC BLOOD PRESSURE: 100 MMHG | HEIGHT: 61 IN | DIASTOLIC BLOOD PRESSURE: 63 MMHG | BODY MASS INDEX: 21.94 KG/M2 | HEART RATE: 67 BPM | WEIGHT: 116.19 LBS

## 2024-11-12 DIAGNOSIS — Z30.09 BIRTH CONTROL COUNSELING: ICD-10-CM

## 2024-11-12 DIAGNOSIS — D69.6 THROMBOCYTOPENIA (HCC): ICD-10-CM

## 2024-11-12 LAB
DEPRECATED RDW RBC AUTO: 38.4 FL (ref 35.1–46.3)
ERYTHROCYTE [DISTWIDTH] IN BLOOD BY AUTOMATED COUNT: 11.2 % (ref 11–15)
HCT VFR BLD AUTO: 36 %
HGB BLD-MCNC: 11.9 G/DL
MCH RBC QN AUTO: 31.1 PG (ref 26–34)
MCHC RBC AUTO-ENTMCNC: 33.1 G/DL (ref 31–37)
MCV RBC AUTO: 94 FL
PLATELET # BLD AUTO: 252 10(3)UL (ref 150–450)
RBC # BLD AUTO: 3.83 X10(6)UL
WBC # BLD AUTO: 6.6 X10(3) UL (ref 4–11)

## 2024-11-12 PROCEDURE — 85027 COMPLETE CBC AUTOMATED: CPT

## 2024-11-12 PROCEDURE — 36415 COLL VENOUS BLD VENIPUNCTURE: CPT

## 2024-11-12 RX ORDER — ACETAMINOPHEN AND CODEINE PHOSPHATE 120; 12 MG/5ML; MG/5ML
0.35 SOLUTION ORAL DAILY
Qty: 84 TABLET | Refills: 3 | Status: SHIPPED | OUTPATIENT
Start: 2024-11-12 | End: 2024-11-12

## 2024-11-12 RX ORDER — NORETHINDRONE ACETATE AND ETHINYL ESTRADIOL .02; 1 MG/1; MG/1
1 TABLET ORAL DAILY
Qty: 84 TABLET | Refills: 3 | Status: SHIPPED | OUTPATIENT
Start: 2024-11-12

## 2025-02-16 NOTE — PROGRESS NOTES
Phone Nurse Education Completed  PT verbalized understanding     Orders placed for new OB labs  TSH- Hx thyroid disorder      Abnormal Pap- No  Last Pap- 23  Genetic Testing- Desires NIPT, Alexa Kit in RN office   Circumcision- Undecided   Feeding- Breast and Formula   Emergency Transfusion- Agreeable   EPDS-0  Type of birth- Desires Epidural   How [place] [facility] Midwives-  Current Patient     Consults Placed- None     Pt. Has answered NO 5P questions and has NO  risk factors.      Pt. Given What pregnant women need to know handout.        Patient verbalized understanding that [employer] messaging is to be used for non-urgent medical questions, and to call the office with any vaginal bleeding, leaking of fluid, cramping, decreased fetal movement, or any other urgent medical question.    
If you are a smoker, it is important for your health to stop smoking. Please be aware that second hand smoke is also harmful.
negative

## 2025-07-01 ENCOUNTER — OFFICE VISIT (OUTPATIENT)
Dept: FAMILY MEDICINE CLINIC | Facility: CLINIC | Age: 30
End: 2025-07-01
Payer: COMMERCIAL

## 2025-07-01 VITALS
HEART RATE: 80 BPM | DIASTOLIC BLOOD PRESSURE: 63 MMHG | BODY MASS INDEX: 19.57 KG/M2 | TEMPERATURE: 97 F | SYSTOLIC BLOOD PRESSURE: 102 MMHG | WEIGHT: 103.63 LBS | HEIGHT: 61 IN

## 2025-07-01 DIAGNOSIS — Z30.41 ENCOUNTER FOR SURVEILLANCE OF CONTRACEPTIVE PILLS: ICD-10-CM

## 2025-07-01 DIAGNOSIS — Z01.84 IMMUNITY STATUS TESTING: ICD-10-CM

## 2025-07-01 DIAGNOSIS — E55.9 VITAMIN D DEFICIENCY: ICD-10-CM

## 2025-07-01 DIAGNOSIS — Z20.2 EXPOSURE TO STD: ICD-10-CM

## 2025-07-01 DIAGNOSIS — Z00.00 WELL ADULT EXAM: Primary | ICD-10-CM

## 2025-07-01 DIAGNOSIS — F41.9 ANXIETY: ICD-10-CM

## 2025-07-01 DIAGNOSIS — E03.9 ACQUIRED HYPOTHYROIDISM: ICD-10-CM

## 2025-07-01 DIAGNOSIS — R35.0 URINARY FREQUENCY: ICD-10-CM

## 2025-07-01 DIAGNOSIS — J30.9 ALLERGIC RHINITIS, UNSPECIFIED SEASONALITY, UNSPECIFIED TRIGGER: ICD-10-CM

## 2025-07-01 PROBLEM — Z34.90 PREGNANCY (HCC): Status: RESOLVED | Noted: 2024-09-20 | Resolved: 2025-07-01

## 2025-07-01 PROBLEM — Z37.9 NORMAL LABOR (HCC): Status: RESOLVED | Noted: 2024-09-28 | Resolved: 2025-07-01

## 2025-07-01 PROCEDURE — 99214 OFFICE O/P EST MOD 30 MIN: CPT | Performed by: NURSE PRACTITIONER

## 2025-07-01 PROCEDURE — 99395 PREV VISIT EST AGE 18-39: CPT | Performed by: NURSE PRACTITIONER

## 2025-07-01 RX ORDER — LEVOTHYROXINE SODIUM 25 UG/1
25 TABLET ORAL
Qty: 90 TABLET | Refills: 3 | Status: SHIPPED | OUTPATIENT
Start: 2025-07-01

## 2025-07-01 RX ORDER — BUSPIRONE HYDROCHLORIDE 5 MG/1
TABLET ORAL
Qty: 90 TABLET | Refills: 1 | Status: SHIPPED | OUTPATIENT
Start: 2025-07-01

## 2025-07-01 NOTE — PROGRESS NOTES
HPI  Pt here for annual physical .   Delivered baby in 9/28/2024-is not breast feeding,   Is on ocps-is not getting much of a periods.   Is having a lot of urinary frequency. Symptoms developed during pregnancy and has continued.      Needs refill levothyroxine-has been out 2 weeks.     Diet-healthy  Exercise-chasing children.    Has noticed she is more short tempered and tired lately-she works full time as a nurse  at [employer].   states her mood has changed a lot   Pt denies depression.      has chronic hep b-pt would like to check for immuity  Review of Systems   Constitutional:  Positive for fatigue. Negative for activity change, appetite change, fever and unexpected weight change.   HENT:  Negative for congestion, ear pain, rhinorrhea and sneezing.    Eyes:  Negative for pain, redness and visual disturbance.   Respiratory:  Negative for cough, chest tightness, shortness of breath and wheezing.    Cardiovascular:  Negative for chest pain and palpitations.   Gastrointestinal:  Negative for abdominal distention, abdominal pain, constipation, diarrhea, nausea and vomiting.   Genitourinary:  Positive for frequency and menstrual problem (periods are very light to absent). Negative for dysuria.   Musculoskeletal:  Negative for back pain, gait problem and myalgias.   Skin:  Negative for color change and rash.   Neurological:  Negative for dizziness, weakness and headaches.   Psychiatric/Behavioral:  Negative for behavioral problems, decreased concentration, dysphoric mood and sleep disturbance. The patient is not nervous/anxious.         Stressed from work, caring for children     Vitals:    07/01/25 1312   BP: 102/63   Pulse: 80   Temp: 97 °F (36.1 °C)   Weight: 103 lb 9.6 oz (47 kg)   Height: 5' 1\" (1.549 m)     Patient's last menstrual period was 12/12/2023 (within days).  Body mass index is 19.58 kg/m².  Wt Readings from Last 6 Encounters:   07/01/25 103 lb 9.6 oz (47 kg)   11/12/24 116 lb 3.2 oz (52.7 kg)    10/23/24 115 lb 0.9 oz (52.2 kg)   09/28/24 140 lb (63.5 kg)   09/27/24 140 lb (63.5 kg)   09/20/24 138 lb (62.6 kg)      BP Readings from Last 5 Encounters:   07/01/25 102/63   11/12/24 100/63   10/23/24 99/62   09/30/24 103/76   09/27/24 106/67       Health Maintenance   Topic Date Due    Annual Physical  05/09/2024    COVID-19 Vaccine (4 - 2024-25 season) 09/01/2024    Annual Depression Screening  01/01/2025    Influenza Vaccine (1) 10/01/2025    Pap Smear  02/02/2027    DTaP,Tdap,and Td Vaccines (4 - Td or Tdap) 07/10/2034    Pneumococcal Vaccine: Birth to 50yrs  Aged Out    Meningococcal B Vaccine  Aged Out       Patient's last menstrual period was 12/12/2023 (within days).    Past Medical History[1]    .Past Surgical History[2]    Family History[3]    Social History     Socioeconomic History    Marital status:      Spouse name: justin Leone    Number of children: 0    Years of education: Not on file    Highest education level: Bachelor's degree (e.g., BA, AB, BS)   Occupational History    Not on file   Tobacco Use    Smoking status: Never    Smokeless tobacco: Never   Vaping Use    Vaping status: Never Used   Substance and Sexual Activity    Alcohol use: Not Currently    Drug use: Never    Sexual activity: Not on file   Other Topics Concern     Service Not Asked    Blood Transfusions No    Caffeine Concern No    Occupational Exposure No    Hobby Hazards Not Asked    Sleep Concern Not Asked    Stress Concern No    Weight Concern Not Asked    Special Diet No    Back Care Not Asked    Exercise No    Bike Helmet No    Seat Belt Yes    Self-Exams Not Asked   Social History Narrative    Not on file     Social Drivers of Health     Food Insecurity: No Food Insecurity (9/28/2024)    Food Insecurity     Food Insecurity: Never true   Transportation Needs: No Transportation Needs (9/28/2024)    Transportation Needs     Lack of Transportation: No     Car Seat: Yes   Stress: No Stress Concern Present  (9/28/2024)    Stress     Feeling of Stress : No   Housing Stability: Low Risk  (9/28/2024)    Housing Stability     Housing Instability: No     Housing Instability Emergency: Not on file     Crib or Bassinette: Yes       Current Medications[4]    Allergies:  Allergies[5]    Physical Exam  Vitals and nursing note reviewed.   Constitutional:       General: She is not in acute distress.     Appearance: She is well-developed.   HENT:      Head: Normocephalic and atraumatic.      Right Ear: Tympanic membrane, ear canal and external ear normal.      Left Ear: Tympanic membrane, ear canal and external ear normal.      Nose: Congestion present.   Eyes:      General:         Right eye: No discharge.         Left eye: No discharge.      Conjunctiva/sclera: Conjunctivae normal.      Pupils: Pupils are equal, round, and reactive to light.   Neck:      Thyroid: No thyromegaly.   Cardiovascular:      Rate and Rhythm: Normal rate and regular rhythm.      Heart sounds: Normal heart sounds. No murmur heard.  Pulmonary:      Effort: Pulmonary effort is normal. No respiratory distress.      Breath sounds: Normal breath sounds. No wheezing or rales.   Chest:      Chest wall: No tenderness.   Abdominal:      General: Bowel sounds are normal. There is no distension.      Palpations: Abdomen is soft.      Tenderness: There is no abdominal tenderness.   Musculoskeletal:         General: No tenderness. Normal range of motion.      Cervical back: Normal range of motion and neck supple.   Lymphadenopathy:      Cervical: No cervical adenopathy.   Skin:     General: Skin is warm and dry.      Findings: No rash.   Neurological:      Mental Status: She is alert and oriented to person, place, and time.      Coordination: Coordination normal.   Psychiatric:         Behavior: Behavior normal.         Thought Content: Thought content normal.         Judgment: Judgment normal.         Assessment and Plan:  Problem List Items Addressed This Visit        Acquired hypothyroidism    Continue present management levothyroxine 25 mcg daily  Check tsh levels           Relevant Medications    levothyroxine 25 MCG Oral Tab    Allergic rhinitis    -otc non-drowsy antihistamine (generic claritin, zyrtec or allegra)  -add steroidal nasal spray (flonase, rhinocort -generic works well)    -supportive care discussed  -Please call if symptoms worsen or are not resolving.            Anxiety    Can try buspar 5 mg 1/2-1 tablet twice a day to three times per day as needed  Follow up if symptoms not improving           Relevant Medications    busPIRone 5 MG Oral Tab    Encounter for surveillance of contraceptive pills    Change ocps to lo loestrin fe q d  Discussed w pt that mood changes may be related to ocp use         Exposure to STD - partner HepB positive    Relevant Orders    Hepatitis B Surface Antibody    Immunity status testing    Hep b - is hep b positive         Relevant Orders    Hepatitis B Surface Antibody    Urinary frequency    U/a  Refer urology-may need pelvic floor therapy referral         Relevant Orders    Urinalysis with Culture Reflex    UROLOGY - INTERNAL    Vitamin D deficiency    Check vitamin d levels           Relevant Medications    levothyroxine 25 MCG Oral Tab    Other Relevant Orders    Vitamin D    Well adult exam - Primary    Screening labs  Up to date pap  Please aim to eat a diet high in fresh fruits and vegetables, lean protein sources, complex carbohydrates and limited processed and fast foods.  Try to get at least 150 minutes of exercise per week-a combination of weight resistance and cardio is preferred.             Relevant Orders    CBC, Platelet; No Differential    Comp Metabolic Panel (14)    Lipid Panel    TSH W Reflex To Free T4    Vitamin B12    Vitamin D    Ferritin    Iron And Tibc                   Discussed plan of care with pt and pt is in agreement.All questions answered. Pt to call with questions or  concerns.    Encouraged to sign up for My Chart if not already registered.     Note to patient and family:  The  Cures Act makes medical notes available to patients in the interest of transparency.  However, please be advised that this is a medical document.  It is intended as a peer to peer communication.  It is written in medical language and may contain abbreviations or verbiage that are technical and unfamiliar.  It may appear blunt or direct.  Medical documents are intended to carry relevant information, facts as evident, and the clinical opinion of the practitioner.         [1]   Past Medical History:   Anxiety    Exposure to STD - partner HepB positive    Normal labor (HCC)     (normal spontaneous vaginal delivery) (HCC)    Pregnancy ([facility])    Birth plan reviewed:    Prior birth experiences/outcomes: Second baby; went smoothly  bASA: no  Support:  ([place])  Pain management: unmedicated  Vitamin K: Yes  Erythromycin ointment: Yes  Placenta: No  Circumcision: Boy --yes  Peds: Dr. Lisa Redding   Feeding method: Breastfeeding   Housing/car seat: stable/ yes  Contraception: defer to 6 week appt, would like pills      Thyroid disease    2 years ago on meds   [2]   Past Surgical History:  Procedure Laterality Date    [place] teeth removed     [3]   Family History  Problem Relation Age of Onset    Hypertension Father     Hypertension Paternal Grandmother     Other (Other) Paternal Grandmother         kidney stones    Hypertension Paternal Grandfather    [4]   Current Outpatient Medications   Medication Sig Dispense Refill    levothyroxine 25 MCG Oral Tab Take 1 tablet (25 mcg total) by mouth before breakfast. 90 tablet 3    busPIRone 5 MG Oral Tab Take 1/2 to 1 tablet tid daily 90 tablet 1    docusate sodium 100 MG Oral Cap Take 100 mg by mouth 2 (two) times daily as needed for constipation. (Patient not taking: Reported on 2025) 30 capsule 0    ibuprofen 600 MG Oral Tab Take 1 tablet (600  mg total) by mouth every 6 (six) hours. (Patient not taking: Reported on 7/1/2025) 30 tablet 0    prenatal vitamin with DHA 27-0.8-228 MG Oral Cap Take 1 capsule by mouth daily. (Patient not taking: Reported on 7/1/2025)     [5] No Known Allergies

## 2025-07-02 NOTE — ASSESSMENT & PLAN NOTE
Screening labs  Up to date pap  Please aim to eat a diet high in fresh fruits and vegetables, lean protein sources, complex carbohydrates and limited processed and fast foods.  Try to get at least 150 minutes of exercise per week-a combination of weight resistance and cardio is preferred.

## 2025-07-02 NOTE — ASSESSMENT & PLAN NOTE
Can try buspar 5 mg 1/2-1 tablet twice a day to three times per day as needed  Follow up if symptoms not improving

## 2025-07-08 ENCOUNTER — PATIENT MESSAGE (OUTPATIENT)
Dept: FAMILY MEDICINE CLINIC | Facility: CLINIC | Age: 30
End: 2025-07-08

## 2025-07-09 RX ORDER — NORETHINDRONE ACETATE AND ETHINYL ESTRADIOL, ETHINYL ESTRADIOL AND FERROUS FUMARATE 1MG-10(24)
1 KIT ORAL DAILY
Qty: 28 TABLET | Refills: 11 | Status: SHIPPED | OUTPATIENT
Start: 2025-07-09 | End: 2026-07-09

## 2025-07-09 NOTE — TELEPHONE ENCOUNTER
See [employer] message. Per LOV script pended for MD approval.     Encounter for surveillance of contraceptive pills      Change ocps to lo loestrin fe q d  Discussed w pt that mood changes may be related to ocp use

## 2025-08-07 ENCOUNTER — LAB ENCOUNTER (OUTPATIENT)
Dept: LAB | Facility: HOSPITAL | Age: 30
End: 2025-08-07
Attending: NURSE PRACTITIONER

## 2025-08-07 DIAGNOSIS — Z01.84 IMMUNITY STATUS TESTING: ICD-10-CM

## 2025-08-07 DIAGNOSIS — R35.0 URINARY FREQUENCY: Primary | ICD-10-CM

## 2025-08-07 DIAGNOSIS — Z20.2 EXPOSURE TO STD: ICD-10-CM

## 2025-08-07 DIAGNOSIS — E55.9 VITAMIN D DEFICIENCY: ICD-10-CM

## 2025-08-07 DIAGNOSIS — Z00.00 WELL ADULT EXAM: ICD-10-CM

## 2025-08-07 LAB
ALBUMIN SERPL-MCNC: 4.7 G/DL (ref 3.2–4.8)
ALBUMIN/GLOB SERPL: 1.4 (ref 1–2)
ALP LIVER SERPL-CCNC: 55 U/L (ref 37–98)
ALT SERPL-CCNC: 16 U/L (ref 10–49)
ANION GAP SERPL CALC-SCNC: 10 MMOL/L (ref 0–18)
AST SERPL-CCNC: 19 U/L (ref ?–34)
BILIRUB SERPL-MCNC: 0.8 MG/DL (ref 0.3–1.2)
BUN BLD-MCNC: 12 MG/DL (ref 9–23)
BUN/CREAT SERPL: 16.7 (ref 10–20)
CALCIUM BLD-MCNC: 9.3 MG/DL (ref 8.7–10.4)
CHLORIDE SERPL-SCNC: 102 MMOL/L (ref 98–112)
CHOLEST SERPL-MCNC: 149 MG/DL (ref ?–200)
CO2 SERPL-SCNC: 25 MMOL/L (ref 21–32)
CREAT BLD-MCNC: 0.72 MG/DL (ref 0.55–1.02)
DEPRECATED HBV CORE AB SER IA-ACNC: 61 NG/ML (ref 50–306)
DEPRECATED RDW RBC AUTO: 37.8 FL (ref 35.1–46.3)
EGFRCR SERPLBLD CKD-EPI 2021: 116 ML/MIN/1.73M2 (ref 60–?)
ERYTHROCYTE [DISTWIDTH] IN BLOOD BY AUTOMATED COUNT: 11.5 % (ref 11–15)
FASTING PATIENT LIPID ANSWER: YES
FASTING STATUS PATIENT QL REPORTED: YES
GLOBULIN PLAS-MCNC: 3.4 G/DL (ref 2–3.5)
GLUCOSE BLD-MCNC: 84 MG/DL (ref 70–99)
HBV SURFACE AB SER QL: REACTIVE
HBV SURFACE AB SERPL IA-ACNC: >1000 MIU/ML
HCT VFR BLD AUTO: 37.3 % (ref 35–48)
HDLC SERPL-MCNC: 54 MG/DL (ref 40–59)
HGB BLD-MCNC: 12.4 G/DL (ref 12–16)
IRON SATN MFR SERPL: 24 % (ref 15–50)
IRON SERPL-MCNC: 89 UG/DL (ref 50–170)
LDLC SERPL CALC-MCNC: 75 MG/DL (ref ?–100)
MCH RBC QN AUTO: 29.9 PG (ref 26–34)
MCHC RBC AUTO-ENTMCNC: 33.2 G/DL (ref 31–37)
MCV RBC AUTO: 89.9 FL (ref 80–100)
NONHDLC SERPL-MCNC: 95 MG/DL (ref ?–130)
OSMOLALITY SERPL CALC.SUM OF ELEC: 283 MOSM/KG (ref 275–295)
PLATELET # BLD AUTO: 249 10(3)UL (ref 150–450)
POTASSIUM SERPL-SCNC: 3.7 MMOL/L (ref 3.5–5.1)
PROT SERPL-MCNC: 8.1 G/DL (ref 5.7–8.2)
RBC # BLD AUTO: 4.15 X10(6)UL (ref 3.8–5.3)
SODIUM SERPL-SCNC: 137 MMOL/L (ref 136–145)
TOTAL IRON BINDING CAPACITY: 365 UG/DL (ref 250–425)
TRANSFERRIN SERPL-MCNC: 292 MG/DL (ref 250–380)
TRIGL SERPL-MCNC: 114 MG/DL (ref 30–149)
TSI SER-ACNC: 2.65 UIU/ML (ref 0.55–4.78)
VIT B12 SERPL-MCNC: 642 PG/ML (ref 211–911)
VIT D+METAB SERPL-MCNC: 17.8 NG/ML (ref 30–100)
VLDLC SERPL CALC-MCNC: 18 MG/DL (ref 0–30)
WBC # BLD AUTO: 5.5 X10(3) UL (ref 4–11)

## 2025-08-07 PROCEDURE — 36415 COLL VENOUS BLD VENIPUNCTURE: CPT

## 2025-08-07 PROCEDURE — 82306 VITAMIN D 25 HYDROXY: CPT

## 2025-08-07 PROCEDURE — 85027 COMPLETE CBC AUTOMATED: CPT

## 2025-08-07 PROCEDURE — 82728 ASSAY OF FERRITIN: CPT

## 2025-08-07 PROCEDURE — 84466 ASSAY OF TRANSFERRIN: CPT

## 2025-08-07 PROCEDURE — 82607 VITAMIN B-12: CPT

## 2025-08-07 PROCEDURE — 83540 ASSAY OF IRON: CPT

## 2025-08-07 PROCEDURE — 86706 HEP B SURFACE ANTIBODY: CPT

## 2025-08-07 PROCEDURE — 84443 ASSAY THYROID STIM HORMONE: CPT

## 2025-08-07 PROCEDURE — 80053 COMPREHEN METABOLIC PANEL: CPT

## 2025-08-07 PROCEDURE — 80061 LIPID PANEL: CPT

## 2025-08-09 ENCOUNTER — RESULTS FOLLOW-UP (OUTPATIENT)
Dept: FAMILY MEDICINE CLINIC | Facility: CLINIC | Age: 30
End: 2025-08-09

## 2025-08-09 DIAGNOSIS — E55.9 VITAMIN D DEFICIENCY: Primary | ICD-10-CM

## 2025-08-11 RX ORDER — FOLIC ACID 1 MG/1
50000 TABLET ORAL WEEKLY
Qty: 12 CAPSULE | Refills: 3 | Status: SHIPPED | OUTPATIENT
Start: 2025-08-11

## 2025-08-29 ENCOUNTER — LAB ENCOUNTER (OUTPATIENT)
Dept: LAB | Facility: HOSPITAL | Age: 30
End: 2025-08-29
Attending: NURSE PRACTITIONER

## 2025-08-29 DIAGNOSIS — R35.0 URINARY FREQUENCY: ICD-10-CM

## 2025-08-29 LAB
BILIRUB UR QL: NEGATIVE
CLARITY UR: CLEAR
COLOR UR: YELLOW
GLUCOSE UR-MCNC: NORMAL MG/DL
HGB UR QL STRIP.AUTO: NEGATIVE
KETONES UR-MCNC: NEGATIVE MG/DL
LEUKOCYTE ESTERASE UR QL STRIP.AUTO: NEGATIVE
NITRITE UR QL STRIP.AUTO: NEGATIVE
PH UR: 5.5 (ref 5–8)
PROT UR-MCNC: 20 MG/DL
SP GR UR STRIP: 1.03 (ref 1–1.03)
UROBILINOGEN UR STRIP-ACNC: NORMAL

## 2025-08-29 PROCEDURE — 81001 URINALYSIS AUTO W/SCOPE: CPT

## (undated) NOTE — MR AVS SNAPSHOT
After Visit Summary   2/2/2024    Nanette Wayne   MRN: [id number]           Visit Information     Date & Time  2/2/2024 11:15 AM Provider  Ashley Young CNM Department  [facility]. Phone  [phone]      Your Vitals Were  Most recent update: 2/2/2024 11:27 AM    BP   109/70          Pulse   76          Ht   61\"          Wt   103 lb          LMP   12/12/2023 (Within Days)             BMI   19.46 kg/m²         Allergies as of 2/2/2024  Review status set to Review Complete on 2/2/2024   No Known Allergies     Your Current Medications        Dosage    levothyroxine 25 MCG Oral Tab Take 1 tablet (25 mcg total) by mouth before breakfast.      Diagnoses for This Visit    7 weeks gestation of pregnancy   [861162]  -  Primary  Pregnancy with uncertain fetal viability, single or unspecified fetus   [7561760]    Spotting in early pregnancy   [348970]    Screening for STD (sexually transmitted disease)   [688051]    Pap smear for cervical cancer screening   [235866]             Follow-up    Return for further care pending results of blood test and ultrasound and call immediately if further bleeding.     We Ordered the Following     Normal Orders This Visit    Chlamydia/Gc Amplification [[id number] CUSTOM]     ThinPrep PAP with HPV Reflex Request B [[id number] CUSTOM]     ThinPrep PAP with HPV Reflex Request [[id number] CUSTOM]     Urine Culture, Routine [[id number] CUSTOM]     Vaginitis Vaginosis PCR Panel [[id number] CUSTOM]     Future Labs/Procedures Expected by Expires    Chlamydia/Gc Amplification [[id number] CUSTOM]  2/2/2024 2/1/2025    ThinPrep PAP with HPV Reflex Request B [[id number] CUSTOM]  2/2/2024 2/2/2025    Urine Culture, Routine [[id number] CUSTOM]  2/2/2024 (Approximate) 2/2/2025    US PREG 1ST TRIMESTER (CPT=76801) [COMBO CPT(R)]  2/2/2024 (Approximate) 2/2/2025    Vaginitis Vaginosis PCR Panel [[id number] CUSTOM]  2/2/2024 (Approximate) 2/2/2025      Follow-up  Instructions    Return for further care pending results of blood test and ultrasound and call immediately if further bleeding.     Imaging Scheduling Instructions     Around February 2, 2024   Imaging:   US PREG 1ST TRIMESTER (CPT=76801)    Instructions: To schedule an appointment for your radiology test please call [facility] at [phone].          Instructions      Healthy Eating Habits During Pregnancy    It’s important to develop healthy eating habits while you are pregnant, for you as well as for your baby. Here are some ways to stay healthy.  Aim for a healthy weight  A slow, steady rate of weight gain is often best. After the first trimester, you may gain about a pound a week. If you were overweight before pregnancy, you need to gain fewer pounds. Your healthcare provider can give you a healthy weight goal for your pregnancy.  Don’t diet  Now is not the time to diet. You may not get enough of the nutrients you and your baby need. Instead, learn how to be a healthy eater. Start by doing it for your baby. Soon, you may do it for yourself.  Vitamins and supplements  Talk with your healthcare provider about taking these and other prenatal vitamins and supplements.  Iron makes the extra blood you need now.  Calcium and vitamin D help build and keep strong bones.  Folic acid helps prevent certain birth defects.  Iodine helps the thyroid work right.  Some vitamins may not be safe to take. Your healthcare provider will tell you which ones to avoid.  Fluids  Drink at least 8 to 10 cups of fluid daily. Your baby needs fluids. Fluids also decrease constipation, flush out toxins and waste, limit swelling, and help prevent bladder infections. Water is best. Other good choices are:  Water or seltzer water with a slice of lemon or lime (These can also help ease an upset stomach.)  Clear soups that are low in salt  Low-fat or fat-free milk, soy or rice milk with calcium added  Popsicles or  gelatin  Things to avoid  Some things might harm your growing baby. Don’t eat or drink:  Alcohol  Unpasteurized dairy foods and juices  Raw or undercooked meat, poultry, fish, or eggs  Unwashed fruits and vegetables  Prepared meats, like deli meats or hot dogs, unless heated until steaming hot  Fish that are high in mercury, like shark, swordfish, jolanta mackerel, tilefish, and albacore tuna  Things to limit  Ask your healthcare provider whether it’s safe to eat or drink:  Caffeine  Artificial sweeteners  Organ meats  Certain types of fish  Fish and shellfish that contain mercury in lower amounts, like shrimp, canned light tuna, salmon, pollock, and catfish  [employer] last reviewed this educational content on 2018 [employer], [facility]. [address] [place] [address]. All rights reserved. This information is not intended as a substitute for professional medical care. Always follow your healthcare professional's instructions.        Nutrition During Pregnancy    Having a healthy baby depends mostly on you. What you eat matters to your baby and your health. During pregnancy, you will likely need about 300 more calories per day than before you became pregnant. Each day, try to eat the number of servings listed here for each food group. In addition, cut down on salt and caffeine. Limit the amount of sweets and high-fat foods you eat. Don’t smoke or drink alcohol.  Important: See your healthcare provider as often as requested. If you have any questions, be sure to ask them.  Fruits  2 cups  Examples of 1-cup servings:  1 medium apple  1 medium orange  1 medium banana  1 cup chopped fruit  1 cup 100% fruit juice (pasteurized)  1/2 cup dried fruit Vegetables  2-1/2 to 3 cups   Examples of 1 servin cups raw, leafy greens  1 cup raw or cooked cut-up vegetables  1 cup 100% vegetable juice (pasteurized) Grains & Cereals*  6 to 8 ounces  Examples of 1-ounce servings:  1 slice bread  1/2 cup  cooked rice  1/2 cup cooked cereal  1/2 cup pasta  1 ounce cold cereal Fats & Oils  6 to 8 teaspoons   Dairy**  3 cups  Examples of 1-cup servings:  1 cup milk  1 cup yogurt  1-1/2 ounces natural cheese  2 ounces processed cheese Protein---  5 to 6-1/2 ounces  Examples of 1-ounce servings:  1 egg  1 ounce of lean meat, poultry, or fish  1/4 cup cooked beans  1 tablespoon peanut butter  1/2 ounce nuts Fluids  8 or more 8-ounce glasses  Examples:  Water  Diluted juices: Apple, orange, cranberry  Mineral water  Clear soups, broth     *Note: Choose whole grains whenever possible.  ** Note: Try to choose low-fat options; avoid soft cheeses and unpasteurized milk.  --- Notes: Avoid raw or undercooked meats, eggs, and seafood. fish, and shellfish. Also, some types of fish, like shark, swordfish, and jolanta mackerel should not be eaten during pregnancy. Avoid hot dogs, luncheon meats, and cold cuts unless heated to steaming just before being served. Ask your healthcare provider about safe choices.     Prenatal supplements  A prenatal supplement is a pill that you take daily during pregnancy. It helps make sure you’re getting the right amount of certain nutrients that are important to your baby. Ask your healthcare provider to help you choose the best one for you. Important nutrients during pregnancy include:  Folic acid. It's best to start taking this supplement 1 month before you start trying to get pregnant. Folic acid helps prevent certain problems in your baby. During pregnancy, you need to take 400 micrograms (mcg) of folic acid every day for the first 2 to 3 months after conception, and then 600 mcg is needed for growing fetus and placenta.  Iron, calcium, and vitamin D. You may also be advised to take these supplements during pregnancy. They help keep you and your baby healthy. Be sure to take them at different times because calcium makes it hard for the body to absorb iron. Taking iron with orange juice helps to  increase its absorption.   [employer] last reviewed this educational content on 12/1/2017  © 7491-5569 [employer], [employer]. [address] [place] [address], [place] [address]. All rights reserved. This information is not intended as a substitute for professional medical care. Always follow your healthcare professional's instructions.        Pregnancy: Planning Your Exercise Routine    While you’re pregnant, an exercise routine helps both your mind and your body feel good. It tones your muscles and makes them stronger. It also gives you and your baby more oxygen.  The right exercise for you  Overall conditioning is best for you and your baby. Try walking, swimming, or riding a stationary bike. Always warm up, cool down, and drink enough fluids. Keep a snack close by in case your blood sugar gets low. Discuss exercise choices with your healthcare provider. Talk about the following:  If you already exercise, find out how to adapt your routine while you’re pregnant. Keep the intensity of the exercise moderate.  Ask if there are any local prenatal exercise classes, like yoga or water aerobics. Find out which prenatal exercise videos are good choices.  If you were not exercising before your pregnancy, find out the best way to start. Now is not the time to begin a new workout on your own. Start slowly. Listen to your body.  Ask which forms of exercise you should avoid. These may include risky activities like hot yoga, horseback riding, scuba diving, skiing, skating, and contact sports.  Pelvic tilts  These help strengthen your stomach muscles and low back. You can do pelvic tilts instead of sit-ups.  Do this exercise on your hands and knees.  Relax the back of your neck. Pull your stomach in until your low back flattens.  Hold for 30 seconds. Release. Repeat 10 times. Do this twice a day.  Kegel exercises  Kegel exercises strengthen the pelvic muscles. Doing Kegels daily helps prepare these muscles for delivery. Kegels also  help ease your recovery. You exercise these muscles by tightening, holding, then relaxing them. To do 1 type of Kegel exercise, contract as if you were stopping your urine stream (but do it when you’re not urinating). Hold for 10 seconds, then repeat 10 times, a few times a day.  Tips to add activity  Here are some tips to follow:  Park the car farther from a store and walk.  If you can, do errands on foot instead of driving.  Walk across the office to talk to someone in person instead of calling.  While waiting for appointments, go up and down stairs or around the block.   Tips to stay active  Here are some tips to follow:  Maintain your routine. But exercise less intensely if you feel tired.  Base your workout on how you feel, not your heart rate. Heart rates aren’t a good way to measure effort during pregnancy.  Avoid exercising on your back after week 16.   What are the warning signs that I should stop exercising?  Stop exercising and call your healthcare provider if you have any of these symptoms:  Vaginal bleeding   Dizziness or feeling faint   Increased shortness of breath   Chest pain   Headache   Muscle weakness   Calf (back of the leg) pain or swelling    Uterine contractions or pre-term labor   Decreased fetal movement   Fluid leaking (or gushing) from your vagina  [employer] last reviewed this educational content on 1/1/2018 © 2000-2020 The [employer], [employer]. [address] [place] [address], [place] [address]. All rights reserved. This information is not intended as a substitute for professional medical care. Always follow your healthcare professional's instructions.        Exercise During Pregnancy  Regular exercise can help you adapt to the changes your body is going through during pregnancy. Exercising may help you relax, and it gets you ready for labor and delivery. Talk to your healthcare provider about the kinds of activities you can do. Then go ahead and enjoy them.    Get started  Even if you didn’t  exercise before pregnancy, it is not too late to start. Choose an activity that you like and that fits your lifestyle. Begin slowly and build up a little at a time. Be sure to check with your healthcare provider before starting any exercise program. The following tips may help you get started:  Choose a time and place to exercise each day.  Wear loose-fitting clothes and comfortable athletic shoes.  Stretch before and after you exercise. (Be sure to stretch slowly and to hold stretches for 30 to 40 seconds.)  Be active  Unless your healthcare provider says otherwise, try to exercise for 30 minutes or more most days of the week:  Overall conditioning, like swimming, bicycling, or walking, is especially beneficial.  Aerobics and exercises that increase your pulse rate help condition your body and strengthen your heart. Ask about special prenatal aerobics classes.  Exercise safely  These tips will help you have a safe, healthy workout:  Stay cool. Stop exercising if you feel overheated.  Slow down if you’re out of breath. If you can’t talk during exercise, lower the intensity of the workout.  Monitor the intensity of your workout. Only do moderate-intensity (not strenuous) exercise.  Stay off your back. Lying on your back can decrease blood flow to your baby.  Drink water before, during and after your workout.  Eat 300 extra calories a day. A light snack before and after you exercise will help keep your energy up.  Avoid activities requiring balancing skills later in pregnancy.  Do Kegel exercises  Kegel exercises strengthen the pelvic floor muscles used in childbirth. These muscles are the same ones used to stop the flow of urine. Do Kegel exercises daily:  Squeeze your pelvic floor muscles for a count of 3.  Relax, then squeeze again.  Repeat 10 to 15 times in a row at least 3 times a day.  You can do Kegel exercises anytime and anywhere.  Keep walking  No matter what other exercise you do, try to walk whenever you  can:  If you’re working all day, take a lunchtime walk in the park with a friend.  When you shop, park away from the store entrance and walk the extra distance.  Take the stairs instead of the elevator.     When to stop exercising and call your healthcare provider  Call your healthcare provider right away if you have:  Shortness of breath before starting exercise  Vaginal bleeding  Dizziness or feeling faint  Chest pain  Headache  Decreased fetal movement   contractions   Muscle weakness  Calf pain or swelling  Fluid leaking from the vagina   [employer] last reviewed this educational content on 2017 [employer]. [address] [place] [address]. All rights reserved. This information is not intended as a substitute for professional medical care. Always follow your healthcare professional's instructions.        Pregnancy: Your Weight     Your weight will be checked regularly by your healthcare provider.   Being a healthy weight is important for both you and your baby. The weight you gain now is not just extra fat. It is also the weight of your baby. And it is the increased blood and fluids to support the baby. A slow, steady rate of gain is best. How much you should gain depends on your weight before getting pregnant. Check with your healthcare provider to find out what is right for you.  Talk to your healthcare provider if you have any questions.   If you gain too much  Gaining too much weight might cause you to feel tired or you could have a harder pregnancy or birth. If you and your healthcare provider decide you’re gaining too much:  Eat fewer fats and sugars. Instead, eat fruit, vegetables, and whole-grain foods.  Drink plenty of water between meals.  Get at least 20 minutes of light exercise, like walking, each day.  Don’t diet. You might not get enough of the nutrients you or your baby needs.  Keep a diet diary to help you gauge what and how much you are eating.  If  you’re not gaining enough  If you don’t gain enough, your baby could be too small or have health problems. Women tend to gain most of their weight in the second and third trimesters. For now:  Eat many types of foods. Make sure you get enough calcium, protein, and carbohydrates.  Don’t skip meals.  Eat healthy snacks.  Pick nutrient-dense, high-calorie healthy food like trail mix or protein shakes.  See a dietitian for help.  Talk to your healthcare provider if you have had an eating disorder or problems with certain foods.  The following are ways to get more calories:  Eat breakfast every day. Peanut butter or a slice of cheese on toast can give you an extra protein boost.  Snack between meals. Yogurt and dried fruits can provide protein, calcium, and minerals.  Try to eat more foods that are high in good fats, like nuts, fatty fish, avocados, and olive oil.  Drink juices made from real fruit that are high in vitamin C or beta carotene, like grapefruit juice, orange juice, papaya nectar, apricot nectar, and carrot juice.  Avoid junk food, like foods high in sugar.  [employer] last reviewed this educational content on 1/1/2018  © 0021-8976 [employer]. [address] [place] [address], [place] [address]. All rights reserved. This information is not intended as a substitute for professional medical care. Always follow your healthcare professional's instructions.        Dental Care for Pregnant Women  Pregnancy is a time when your oral health needs more attention. Hormone changes during pregnancy can cause certain problems with teeth and gums, and make treatment more complicated.  How pregnancy affects oral health  During pregnancy, hormone changes can cause swollen, bleeding, and irritated gums (gingivitis). Your gums may be very sore, and brushing and flossing may cause discomfort. If left untreated, gingivitis can lead to a more serious gum disease called periodontitis. Severe periodontitis can lead to tooth  loss.  Some pregnant women also have small bright-red growths on their gums that bleed easily. These are often called “pregnancy tumors.” They are not cancer. They usually go away right after birth. Talk with your dentist or healthcare provider if you have concerns.  Keeping a healthy mouth  Brush twice daily with fluoride toothpaste. Floss at least once a day.  If you have morning sickness, rinse your mouth with a teaspoon of baking soda mixed with water after vomiting. Do not brush your teeth right after vomiting. This can remove tooth enamel.  See your dentist for cleanings and checkups more often if needed. This is especially true in your second and third trimesters.  Ask your dentist or healthcare provider if you should use a special mouth rinse to help prevent gingivitis.  Tell your dentist or healthcare provider about any changes in your mouth, such as soreness or bleeding.  Safety concerns  Make sure to tell your dentist that you’re pregnant. He or she can help you stay safe. If you need to have dental X-rays during pregnancy, he or she will make sure you are fully protected. You will wear a lead apron over your belly during the X-ray process. The apron helps block radiation from the X-rays.  If you need to take medicines like antibiotics or pain relievers for dental problems, talk with your healthcare providers first. Your providers will discuss the risks and benefits of taking these during pregnancy.  If you have a high-risk pregnancy, your dentist and your healthcare provider may advise that certain dental treatments wait until after you give birth.  [employer] last reviewed this educational content on 12/1/2017 © 2000-2020 [employer], [employer]. [address] [place] [address], [place] [address]. All rights reserved. This information is not intended as a substitute for professional medical care. Always follow your healthcare professional's instructions.       Pregnancy: Your First Trimester Changes  The first  trimester is a time of rapid development for your baby. Because your baby is growing so quickly, it is important that you start a healthy lifestyle right away. By the end of the first trimester, your baby has formed all of its major body organs and weighs just over an ounce.     Actual size of baby is 1/4\"    Month 1 (weeks 1 to 4)  The placenta (the organ that nourishes your baby) begins to form. The brain, spinal cord, heart, gastrointestinal tract, and lungs begin to develop. Your baby is about 1/4-inch long by the end of the first month.     Actual size of baby is 1\"      Month 2 (weeks 5 to 8)  All of your baby’s major body organs form. The face, fingers, toes, ears, and eyes appear. By the end of the month, your baby is about 1-inch long.     Actual size of baby is 3\"      Month 3 (weeks 9 to 12)  Your baby can open and close its fists and mouth. The sexual organs begin to form. As the first trimester ends, your baby is about 3-inches long.  [employer] last reviewed this educational content on 10/1/2017  © 6817-7092 [employer]. [address] [place] [address]. All rights reserved. This information is not intended as a substitute for professional medical care. Always follow your healthcare professional's instructions.        Adapting to Pregnancy: First Trimester    As your body adjusts, you may have to change or limit your daily activities. You’ll need more rest. You may also need to use the energy you have more wisely.  Your changing body  Almost every part of your body is affected as you adapt to pregnancy. The uterus and cervix will begin to soften right away. You may not look very pregnant during the first 3 months. But you are likely to have some common signs of early pregnancy:  Nausea  Fatigue  Frequent urination  Mood swings  Bloating of the belly  Constipation  Heartburn  Missed or light periods (first trimester bleeding)  Nipple or breast tenderness and breast swelling  It’s  not too late to start good habits  What matters most is protecting your baby from this moment on. If you smoke, drink alcohol, or use drugs, now is the time to stop. If you need help, talk with your healthcare provider:  Smoking increases the risk of stillbirth or having a low-birth-weight baby. If you smoke, quit now.  Alcohol and drugs have been linked with miscarriage, birth defects, intellectual disability, and low birth weight. Do not drink alcohol or take drugs.  Tips to relieve nausea  Although nausea can happen at any time of the day, it may be worse in the morning. To help prevent nausea:  Eat small, light meals at frequent intervals.  Drink fluids often.  Get up slowly. Eat a few unsalted crackers before you get out of bed.  Avoid smells that bother you.  Avoid spicy and fatty foods.  Eat an ice pop in your favorite flavor.  Get plenty of rest.  Ask your healthcare provider about taking lashawn or vitamin B6 for nausea and vomiting.  Talk with your healthcare provider if you take vitamins that upset your stomach.  Work concerns  The end of the first trimester is a good time to discuss working during pregnancy with your employer. Follow your healthcare provider’s advice if your job needs you to stand for a long time, work with hazardous tools, or even sit at a desk all day. Your workspace, workload, or scheduled hours may need to be adjusted. Perhaps you can change body postures more often or take an extra break.  Advice for travel  Talk to your healthcare provider first, but the second trimester may be the best time for any travel. You may be advised to avoid certain trips while you’re pregnant. Food and water can be concerns in developing countries. Travel by car is a good choice, as you can stop, get out, and stretch. Bring snacks and water along. Fasten the lap belt below your belly, low over your hips. Also be sure to wear the shoulder harness.  Intimacy  Unless your healthcare provider tells you to,  there is no reason to stop having sex while you’re pregnant. You or your partner may notice changes in desire. Desire may be less in the first trimester, due to nausea and fatigue. In the second trimester, sex may be very enjoyable. The third trimester can be a challenge comfort-wise. Try different positions and see what’s best for you both.  [employer] last reviewed this educational content on 10/1/2017  © 0369-3905 [employer]. [address] [place] [address], [place] [address]. All rights reserved. This information is not intended as a substitute for professional medical care. Always follow your healthcare professional's instructions.        Comfort Tips During Pregnancy    Talk with your healthcare provider before using pain-relieving medicine at any time during your pregnancy.  First trimester tips  Nausea  Get up slowly. Eat a few unsalted crackers before you get out of bed.  Avoid smells that bother you.  Eat small bland low fat, light high-protein meals at frequent intervals.  Sip on water, weak tea, or clear soft drinks, like ginger ale. Eat ice chips.  Fatigue  Take catnaps when you can.  Get regular exercise.  Accept help from others.  Practice good sleep habits, like going to bed and getting up at the same time each day. Use your bed only for sleep and sex.  Mood swings  Talk about your feelings with others, including other mothers.  Limit sugar, chocolate, and caffeine.  Eat a healthy diet. Don’t skip meals.  Get regular exercise.  Headaches  Get fresh air and exercise.  Relax and get enough rest.  Check with your healthcare provider before taking any pain medicines.  Second trimester tips  Here are some suggestions to help you cope:  To limit ankle swelling, sit with your feet raised or wear support hose.  If you have pain in your groin and stomach (round ligament pain), avoid sudden twisting movements.  For leg cramps, flexing your foot often brings immediate relief. You also may try massaging your  calf in long, downward strokes, or stretching your legs before going to bed. Get enough exercise and wear shoes with flexible soles.  Third trimester tips  Reducing heartburn  Eat small, light meals throughout the day rather than 3 large ones.  Sleep with your upper body raised 6 inches. Don’t lie down until 2 hours after you eat.  Don't eat greasy, fried, or spicy foods.  Avoid citrus fruits and juices.  Treating constipation  Eat foods high in fiber (whole-grain foods, fresh fruit and vegetables).  Drink plenty of water.  Get regular exercise.  Discuss other medicines (like docusate and psyllium) with your healthcare provider.  Taking care of your breasts  Avoid using harsh soaps or alcohol, which can cause excessive dryness.  Wear nursing bras. They provide more support than regular bras and can be used after pregnancy if you breastfeed.  Getting a good night’s sleep  Take a warm shower before bed.  Sleep on a firm mattress.  Lie on your side with 1 leg crossed over the other.  Use pillows to support arms, legs, and belly.  [employer] last reviewed this educational content on 10/1/2017  © 1469-9868 [employer]. [address] [place] [address]. All rights reserved. This information is not intended as a substitute for professional medical care. Always follow your healthcare professional's instructions.        Bleeding During Early Pregnancy     Ultrasound can help check the health of your fetus.     If you’ve had bleeding early in your pregnancy, you’re not alone. Many other pregnant women have had early bleeding, too. And in most cases, nothing is wrong. But your healthcare provider still needs to know about it. He or she may want to do tests to find out why you’re bleeding. Call your healthcare provider if you notice bleeding during pregnancy.  What causes early bleeding?  The cause of bleeding early in pregnancy is often unknown. But many factors early on in pregnancy may lead to bleeding  or spotting. These include sexual intercourse, which may cause bleeding in any trimester. Here are some other causes:  Implantation of the embryo on the uterine wall  Subchorionic hemorrhage (bleeding between the sac membrane and the uterus)  Miscarriage  Ectopic (tubal) pregnancy  If you notice spotting  Spotting (very light bleeding) is the most common type of bleeding in early pregnancy. If you notice it, call your healthcare provider. Chances are, he or she will tell you that you can care for yourself at home.  If tests are needed  Depending on how much you bleed, your healthcare provider may ask you to come in for some tests. A pelvic exam, for instance, can help see how far along your pregnancy is. You also may have an ultrasound or a Doppler test. These imaging tests use sound waves to check the health of your fetus. The ultrasound may be done on your belly or inside your vagina. Your healthcare provider also may order a special blood test. This test compares your hormone levels in blood samples taken 2 days apart. The results can help your healthcare provider learn more about the implantation of the embryo. Your blood type will also need to be checked to evaluate whether you will need to be treated for Rh sensitization.   Warning signs  If your bleeding doesn’t stop or if you notice any of the following, seek medical help right away:  Soaking a sanitary pad each hour  Bleeding like you’re having a period  Cramping or severe belly pain  Feeling dizzy or faint  Tissue passing through your vagina  Bleeding at any time after the first trimester  Questions you may be asked  Though not normal, bleeding early in pregnancy is common. If you’ve noticed any bleeding, you may be concerned. But keep in mind that bleeding alone doesn’t mean something is wrong. Call your healthcare provider right away, though. He or she may ask you questions like these to help find the cause of your bleeding:  When did your bleeding  start?  Is your bleeding very light (spotting) or is it like a period?  Is the blood bright red or brownish?  Have you had sexual intercourse recently?  Have you had pain or cramping?  Have you felt dizzy or faint?  Monitoring your pregnancy  Bleeding will often stop as quickly as it began. Your pregnancy may go on a normal path again. You may need to make a few extra prenatal visits. But you and your baby will most likely be fine.  StayWell last reviewed this educational content on 6/1/2016 © 2000-2020 [employer]. [address] [place] [address]. All rights reserved. This information is not intended as a substitute for professional medical care. Always follow your healthcare professional's instructions.        Abdominal Pain and Early Pregnancy    The tests you had show that you are pregnant, but the exact cause of your pain isn’t clear.   Some pain and bleeding are common early in pregnancy. Often they stop, and you can go on to have a normal pregnancy and baby. Other times the pain or bleeding can be signs of a miscarriage or ectopic pregnancy. An ectopic pregnancy is a very serious problem. At this time it is unclear if your pregnancy will continue normally, if you will have a miscarriage, or if you could have an ectopic pregnancy. Below is some information about this.   Miscarriage  At this time we don’t know whether you will have a miscarriage, or if things will clear up and your pregnancy will continue normally. We understand that this is emotionally difficult. There is little we can say to change the way you feel. But understand that miscarriages are common.   About 1 or 2 out of every 10 pregnancies end this way. Some end even before you know you are pregnant. This happens for a number of reasons, and usually we never figure out why. It’s important you know that it is not your fault. It didn’t happen because you did anything wrong.   Having sex or exercising does not cause a  miscarriage. These activities are usually safe unless you have pain or bleeding or your healthcare provider tells you to stop. Even minor falls won’t cause a miscarriage. Miscarriages happen because things were not developing as they were supposed to. No medicine can prevent a miscarriage.   Ectopic pregnancy  In a normal pregnancy, the fertilized egg attaches to the wall of the womb (uterus). In an ectopic or tubal pregnancy, the fertilized egg attaches outside the uterus, usually in the fallopian tube. Very rarely, the egg attaches to an ovary or somewhere else in the abdomen. An ectopic pregnancy is much less common than a miscarriage, but it is very serious. The baby can't survive, and as it grows it can rupture the tube. This can cause internal bleeding and even death. Risk factors for an ectopic are:   An ectopic pregnancy in the past  Pelvic inflammatory disease (PID)  Endometriosis  Smoking  An IUD  Additional tests  Because we don’t know what’s causing your symptoms, you will need more tests to figure out what the problem is. You may need the following.   Ultrasound  An ultrasound can usually find a normal pregnancy as early as 4 to 5 weeks along. If the ultrasound does not show the baby inside the uterus, it means one of the following.   You have a normal pregnancy less than 4 weeks along  You are having or recently had a miscarriage  You have an ectopic pregnancy  Quantitative HCG  This test measures the amount of a pregnancy hormone in your blood. Comparing today's test result to a repeat test in 2 days will show whether you have a normal pregnancy.   Laparoscopy  This is a type of surgery. The healthcare provider will put a tube with a light inside your belly (abdomen) to look directly at your pelvic organs. This test is used when it is not safe to wait 2 days for blood test results.   Important information  If you do have an ectopic pregnancy, there is a small chance that the growing fetus can tear the  fallopian tube. This can cause severe internal bleeding. If this happens, you may have:   Sudden severe pain in your lower abdomen  Vaginal bleeding  Weakness, dizziness, and sometimes fainting  If any of these symptoms occur:  Call 911or return right away to the hospital.  Don't drive yourself.  Don't go to your healthcare provider's office or to a clinic. Go to the hospital.  Home care  Follow these guidelines to help care for yourself at home:   Rest until your next exam. Don’t do anything strenuous.  Eat a light diet with foods that are easy to digest.  Don’t have sex until your healthcare provider says it’s OK.  Follow-up care  Follow up with your healthcare provider, or as advised. If you were told to have a repeat blood test in 2 days, it’s important to get it done.   If you had an X-ray or ultrasound, a radiologist will review it. You will be told of any new findings that may affect your care.   Call 911  Call 911 if you have any of these:   Severe pain and very heavy bleeding  Severe lightheadedness, passing out, or fainting  Rapid heart rate  Trouble breathing  Confused or difficulty waking up  When to seek medical advice  Call your healthcare provider right away if any of these occur:   The pain in your abdomen gets worse, either suddenly or gradually.  You are dizzy or weak when you stand.  You have heavy vaginal bleeding. This means soaking 1 pad an hour for 3 hours.  You have vaginal bleeding for more than 5 days.  You have repeated vomiting or diarrhea.  The pain in your abdomen moves to the lower right.  You have blood in your vomit or bowel movements. This will be dark red or black.  You have a fever of 100.4ºF (38ºC) or higher, or as directed by your healthcare provider.  [employer] last reviewed this educational content on 9/1/2019  © 2308-8305 [employer]. [address] [facility], [place] [address]. All rights reserved. This information is not intended as a substitute for professional  medical care. Always follow your healthcare professional's instructions.                      Did you know that [facility] primary care physicians now offer Video Visits through [email or web address] for adult patients for a variety of conditions such as allergies, back pain and cold symptoms? Skip the drive and waiting room and online chat with a doctor face-to-face using your web-cam enabled computer or mobile device wherever you are. Video Visits cost $50 and can be paid hassle-free using a credit, debit, or health savings card.  Not active on [email or web address]? Ask us how to get signed up today!          If you receive a survey from Edwina Chavez, please take a few minutes to complete it and provide feedback. We strive to deliver the best patient experience and are looking for ways to make improvements. Your feedback will help us do so. For more information on Edwina Chavez, please visit [email or web address]           No text in SmartText           No text in SmartText

## (undated) NOTE — Clinical Note
1.  IUP at 20w3d  2. Normal fetal anatomy  3. Biometric measurements are consistent with dates    This was an ultrasound only encounter (no physician visit). The ultrasound was read by Dr. Rod Young and the report was sent to Ms. Hernadez to discuss with the

## (undated) NOTE — LETTER
[facility]  Administration of Anesthesia  I, Nanette Wayne agree to be cared for by a physician anesthesiologist alone and/or with a nurse anesthetist, who is specially trained to monitor me and give me medicine to put me to sleep or keep me comfortable during my procedure    I understand that my anesthesiologist and/or anesthetist is not an employee or agent of [facility] or [employer]. He or she works for [facility]    As the patient asking for anesthesia services, I agree to:  Allow the anesthesiologist (anesthesia doctor) to give me medicine and do additional procedures as necessary. Some examples are: Starting or using an “IV” to give me medicine, fluids or blood during my procedure, and having a breathing tube placed to help me breathe when I’m asleep (intubation). In the event that my heart stops working properly, I understand that my anesthesiologist will make every effort to sustain my life, unless otherwise directed by [facility] Do Not Resuscitate documents.  Tell my anesthesia doctor before my procedure:  If I am pregnant.  The last time that I ate or drank.  iii. All of the medicines I take (including prescriptions, herbal supplements, and pills I can buy without a prescription (including street drugs/illegal medications). Failure to inform my anesthesiologist about these medicines may increase my risk of anesthetic complications.  iv.If I am allergic to anything or have had a reaction to anesthesia before.  I understand how the anesthesia medicine will help me (benefits).  I understand that with any type of anesthesia medicine there are risks:  The most common risks are: nausea, vomiting, sore throat, muscle soreness, damage to my eyes, mouth, or teeth (from breathing tube placement).  Rare risks include: remembering what happened during my procedure, allergic reactions to medications, injury to my airway, heart, lungs, vision, nerves, or  muscles and in extremely rare instances death.  My doctor has explained to me other choices available to me for my care (alternatives).  Pregnant Patients (“epidural”):  I understand that the risks of having an epidural (medicine given into my back to help control pain during labor), include itching, low blood pressure, difficulty urinating, headache or slowing of the baby’s heart. Very rare risks include infection, bleeding, seizure, irregular heart rhythms and nerve injury.  Regional Anesthesia (“spinal”, “epidural”, & “nerve blocks”):  I understand that rare but potential complications include headache, bleeding, infection, seizure, irregular heart rhythms, and nerve injury.    _____________________________________________________________________________  Patient (or Representative) Signature/Relationship to Patient  Date   Time    _____________________________________________________________________________   Name (if used)    Language/Organization   Time    _____________________________________________________________________________  Nurse Anesthetist Signature     Date   Time  _____________________________________________________________________________  Anesthesiologist Signature     Date   Time  I have discussed the procedure and information above with the patient (or patient’s representative) and answered their questions. The patient or their representative has agreed to have anesthesia services.    _____________________________________________________________________________  Witness        Date   Time  I have verified that the signature is that of the patient or patient’s representative, and that it was signed before the procedure  Patient Name: Nanette Wayne     : 1995                 Printed: 2024 at 2:58 PM    Medical Record #: [id number]                                            Page 1 of 1  ----------ANESTHESIA CONSENT----------

## (undated) NOTE — LETTER
02/06/20        Yvonne Wayne  [id number]      Dear Clarice Blanco,    Our records indicate that you have outstanding lab work and or testing that was ordered for you and has not yet been completed:  Orders Placed This Encounter      [facility] [address] [place] [address]

## (undated) NOTE — LETTER
AUTHORIZATION FOR SURGICAL OPERATION OR OTHER PROCEDURE    1.  I hereby authorize DANDRE Herron, and [facility] staff assigned to my case to perform the following operation and/or procedure at the [facility]:    ________IUD removal ____________ Time:  ________ A. M.  P.M.        Patient Name:  ______________________________________________________  (please print)      Patient signature:  ___________________________________________________             Relationship to Patient:

## (undated) NOTE — LETTER
Patient Name: Lisset Espinosa  : 1995  MRN: [id number]  Patient Address: [address] [place] [address]      Coronavirus Disease 2019 (COVID-19)     [facility] is committed to the safety and well-being of our patients, members, David Romero If your symptoms get worse, call your healthcare provider immediately. 3. Get rest and stay hydrated.    4. If you have a medical appointment, call the healthcare provider ahead of time and tell them that you have or may have COVID-19.  5. For medical carla fever-reducing medications; and  · Improvement in respiratory symptoms (e.g., cough, shortness of breath); and  · At least 10 days have passed since symptoms first appeared OR if asymptomatic patient or date of symptom onset is unclear then use 10 days pos donors must:    · Have had a confirmed diagnosis of COVID-19  · Be symptom-free for at least 14 days*    *Some people will be required to have a repeat COVID-19 test in order to be eligible to donate.  If you’re instructed by Ember that a repeat test is r random. Researchers are trying to identify similarities between people with a Post-COVID condition to better understand if there are risk factors. How do I prevent a Post-COVID condition?   The best way to prevent the long-term symptoms of COVID-19 is

## (undated) NOTE — LETTER
VACCINE ADMINISTRATION RECORD  PARENT / GUARDIAN APPROVAL  Date: 7/10/2024  Vaccine administered to: Nanette Wayne     : 1995    MRN: [id number]    A copy of the appropriate Centers for Disease Control and Prevention Vaccine Information statement has been provided. I have read or have had explained the information about the diseases and the vaccines listed below. There was an opportunity to ask questions and any questions were answered satisfactorily. I believe that I understand the benefits and risks of the vaccine cited and ask that the vaccine(s) listed below be given to me or to the person named above (for whom I am authorized to make this request).    VACCINES ADMINISTERED:  Tdap    I have read and hereby agree to be bound by the terms of this agreement as stated above. My signature is valid until revoked by me in writing.  This document is signed by self, relationship: Self on 7/10/2024.:                                                                                                                                         Parent / Guardian Signature                                                Date

## (undated) NOTE — LETTER
01/18/20        Megan Wayne  [address]      Dear Megan Marion,    Our records indicate that you have outstanding lab work and or testing that was ordered for you and has not yet been completed:  Orders Placed This Encounter      U

## (undated) NOTE — LETTER
[facility]  Administration of Anesthesia  1. Stuart Blanco, or _________________________________ acting on her behalf, (Patient) (Dependent/Representative) request to receive anesthesia for my pending procedure/operation/treatment. A physician (anesthesiologist) alone or an anesthesiologist working with a nurse anesthetist may administer my anesthesia. 2. I understand that my anesthesiologist is not an employee or agent of the hospital, but is an independent medical practitioner who has been permitted to use its facilities for the care and treatment of his/her patients. 3. I acknowledge that a physician from [facility] or their designate(s), recommended anesthesia for me using her/his medical judgment. The type(s) of anesthesia I may receive include:                a) General Anesthesia, b) Spinal/Epidural Anesthesia, c) Regional Anesthesia or d) Monitored Anesthesia Care. 4. If my spinal, regional or monitored anesthesia care (local) is not satisfactory for my comfort, or if my medical condition requires, I consent to the administration of general anesthesia. 5. I am aware that the practice of anesthesiology is not an exact science and that some foreseeable risks or consequences may occur. Some common risks/consequences include sore throat and hoarseness, nausea and vomiting, muscle soreness, backache, damage to the mouth/teeth/vocal cords and eye injury. I understand that more rare but serious potential risks of anesthesia include blood pressure changes, drug reactions, cardiac arrest, brain damage, paralysis or death. These risks apply to whether I have general, spinal/epidural, regional or monitored anesthesia care. 6. OBSTETRIC PATIENTS: Specific risks/consequences of spinal/epidural anesthesia may include itching, low blood pressure, difficulty urinating, slowing of the baby's heart rate and headache.  Rare risks include infections, high spinal block, spinal bleeding, seizure, cardiac arrest and death. 7. AWARENESS: I understand that it is possible (but unlikely) to have explicit memory of events from the operating room while under general anesthesia. 8. ELECTROCONVULSIVE THERAPY PATIENTS: This consent serves for all treatments in a single course of therapy. 9. I understand that I must inform my anesthesiologist when I last ate and/or drank to minimize the risk of anesthesia. 10. If I am pregnant, or may pregnant, I understand that elective surgery should be postponed until after the baby is born. Anesthetics cross the placenta and may temporarily anesthetize the baby. Although fetal complications of anesthesia during pregnancy are rare, they may include birth defects, premature labor, brain damage and death. 11. I certify that I informed the anesthesiologist, to the best of my ability, about medication I take including blood thinners, anticoagulants, herbal remedies, narcotics and recreational drugs (e.g. cocaine, marijuana, PCP). Failure to inform my anesthesiologist about these medicines may increase my risk of anesthetic complications. The nature and purpose of my anesthetic management was explained to me. I had the opportunity to ask questions and the answers and information provided meet my satisfaction.   I retain the right to withdraw this consent at any time prior to the administration of said anesthetic.    ___________________________________________________           _____________________________________________________  Patient Signature                                                                                      Witness Signature                ___________________________________________________           _____________________________________________________  Date/Time                                                                                               Responsible person in case of minor/ unconscious pt /Relationship    My signature below affirms that prior to the time of the procedure, I have explained to the patient and/or his/her guardian, the risks and benefits of undergoing anesthesia, as well as any reasonable alternatives.     ___________________________________________________            _____________________________________________________  Physician Signature                            Date/Time  Patient Name: Cyndi Gonzalez     : 1995     Printed: 2/3/2022      Medical Record #: [id number]                              Page 1 of 1    ----------ANESTHESIA CONSENT----------